# Patient Record
Sex: FEMALE | Race: WHITE | NOT HISPANIC OR LATINO | Employment: OTHER | URBAN - NONMETROPOLITAN AREA
[De-identification: names, ages, dates, MRNs, and addresses within clinical notes are randomized per-mention and may not be internally consistent; named-entity substitution may affect disease eponyms.]

---

## 2017-06-20 ENCOUNTER — OFFICE VISIT (OUTPATIENT)
Dept: FAMILY MEDICINE CLINIC | Facility: CLINIC | Age: 62
End: 2017-06-20

## 2017-06-20 VITALS
DIASTOLIC BLOOD PRESSURE: 70 MMHG | HEART RATE: 72 BPM | BODY MASS INDEX: 32.78 KG/M2 | WEIGHT: 192 LBS | TEMPERATURE: 98.2 F | OXYGEN SATURATION: 98 % | HEIGHT: 64 IN | SYSTOLIC BLOOD PRESSURE: 126 MMHG

## 2017-06-20 DIAGNOSIS — E55.9 VITAMIN D DEFICIENCY: ICD-10-CM

## 2017-06-20 DIAGNOSIS — F41.9 ANXIETY: Primary | ICD-10-CM

## 2017-06-20 DIAGNOSIS — K22.2 ESOPHAGEAL STRICTURE: ICD-10-CM

## 2017-06-20 PROBLEM — M72.2 PLANTAR FASCIITIS: Status: ACTIVE | Noted: 2017-06-20

## 2017-06-20 PROCEDURE — 99203 OFFICE O/P NEW LOW 30 MIN: CPT | Performed by: PHYSICIAN ASSISTANT

## 2017-06-20 RX ORDER — OMEPRAZOLE 20 MG/1
CAPSULE, DELAYED RELEASE ORAL
COMMUNITY
Start: 2017-05-25 | End: 2017-11-21 | Stop reason: SDUPTHER

## 2017-06-20 RX ORDER — MELATONIN
1000 DAILY
COMMUNITY
End: 2021-09-04

## 2017-06-20 RX ORDER — NAPROXEN 500 MG/1
TABLET ORAL
COMMUNITY
Start: 2017-05-08 | End: 2017-12-05

## 2017-06-20 RX ORDER — FLUOXETINE HYDROCHLORIDE 20 MG/1
CAPSULE ORAL
COMMUNITY
Start: 2017-05-05 | End: 2017-11-21 | Stop reason: SDUPTHER

## 2017-06-20 NOTE — PROGRESS NOTES
Subjective   Virginia Leiva is a 61 y.o. female. Patient is being seen today to establish care and to discuss getting referrals.     History of Present Illness     PATIENT WITH PMH GASTRITIS. STATES MOTHER WITH GASTRIC CANCER. HAS BEEN ON OMEPRAZOLE REGULARLY. 2015- WAS EATING STEAK AND GOT STUCK - HAD ESOPHAGEAL STRICTURE. REPEAT AS NEEDED- HAS HAD DILATION 3 X. NO DX VALLEJO'S ESOPH. BIOPSIES NORMAL. PREVIOUSLY HAD H PYLORI. COLONOSCOPY- 2012- RECHECK IN 10 YEARS- NORMAL.   FATHER DEC AGE 54 FROM ANEURYSM FROM PANCREATIC CANCER.     ORTHOPEDIST- GAVE INJECTIONS IN WRIST AND HAS BEEN OK SINCE. LAST SHOT IN MARCH. HAD 4 MONTHS PRIOR.   HAS BEEN ON NAPROXEN - HAS BEEN TAKING ONCE DAILY.     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 HAS WORKED WELL SINCE.   VIT D LOW. TAKES 1000IU DAILY.     LAST MAMMOGRAM- 8/2016.   LAST DEXA- NOT SURE.,   LAST COLONOSCOPY 2012- NORMAL RECHECK IN 10 YEARS.   LAST PAP- NOT SINCE HAD HYSTERECTOMY IN 2007. HYST FOR PROBLEMS WITH BLADDER AND UTERUS. HAD HYST WITH BSO. NO HX ABNORMAL PAP. SA 1 PARTNER 37 YEARS.   LAST TD < 5 YEARS.     The following portions of the patient's history were reviewed and updated as appropriate: allergies, current medications, past family history, past medical history, past social history, past surgical history and problem list.    Review of Systems   All other systems reviewed and are negative.      Objective   Physical Exam   Constitutional: She is oriented to person, place, and time. She appears well-developed and well-nourished.   HENT:   Head: Normocephalic and atraumatic.   Right Ear: External ear normal.   Left Ear: External ear normal.   Eyes: Conjunctivae are normal.   Neck: Neck supple.   Cardiovascular: Normal rate, regular rhythm, normal heart sounds and intact distal pulses.  Exam reveals no gallop and no friction rub.    No murmur heard.  Pulmonary/Chest: Effort normal and breath sounds normal. No  respiratory distress. She has no wheezes. She has no rales.   Musculoskeletal: She exhibits no edema or deformity.   Neurological: She is alert and oriented to person, place, and time.   Skin: Skin is warm and dry.   Psychiatric: She has a normal mood and affect. Her speech is normal and behavior is normal. Judgment and thought content normal. Cognition and memory are normal.   Nursing note and vitals reviewed.      Assessment/Plan   Virginia was seen today for establish care and discuss referrals.    Diagnoses and all orders for this visit:    Anxiety    Esophageal stricture    Vitamin D deficiency    Patient Instructions   61 YEAR OLD FEMALE WHO PRESENTS TODAY AS A NEW PATIENT. SHE HAS PMH SIGNIFICANT FOR ANXIETY, GERD, ESOPHAGEAL STRICTURE, AND VITAMIN D DEFICIENCY. PATIENT IS UP TO DATE ON SCREENING/PREVENTATIVE TESTING AND EGD. SHE HAD LABS IN THE RECENT PAST. PATIENT IS STABLE ON CURRENT MEDICATIONS. I ASKED THAT SHE STOP NAPROXEN AND DISCUSSED ALL RISKS OF NSAIDS. SHE WILL STOP NSAIDS AND USE TYLENOL 500 MG 1-2 TABLETS UP TO 2-3 X DAILY AS NEEDED. IF NO CONTROL WITH ARTHRITIS, TO CALL OR RETURN. PATIENT TO SIGN MEDICAL RELEASE FOR PREVIOUS PCP AND SPECIALISTS. I WILL REVIEW TO DETERMINE NEXT LABS AND FOLLOW UP. WILL NEED FOLLOW UP NO MORE THAN 6 MONTHS.

## 2017-06-27 NOTE — PATIENT INSTRUCTIONS
61 YEAR OLD FEMALE WHO PRESENTS TODAY AS A NEW PATIENT. SHE HAS PMH SIGNIFICANT FOR ANXIETY, GERD, ESOPHAGEAL STRICTURE, AND VITAMIN D DEFICIENCY. PATIENT IS UP TO DATE ON SCREENING/PREVENTATIVE TESTING AND EGD. SHE HAD LABS IN THE RECENT PAST. PATIENT IS STABLE ON CURRENT MEDICATIONS. I ASKED THAT SHE STOP NAPROXEN AND DISCUSSED ALL RISKS OF NSAIDS. SHE WILL STOP NSAIDS AND USE TYLENOL 500 MG 1-2 TABLETS UP TO 2-3 X DAILY AS NEEDED. IF NO CONTROL WITH ARTHRITIS, TO CALL OR RETURN. PATIENT TO SIGN MEDICAL RELEASE FOR PREVIOUS PCP AND SPECIALISTS. I WILL REVIEW TO DETERMINE NEXT LABS AND FOLLOW UP. WILL NEED FOLLOW UP NO MORE THAN 6 MONTHS.

## 2017-06-30 ENCOUNTER — TRANSCRIBE ORDERS (OUTPATIENT)
Dept: ADMINISTRATIVE | Facility: HOSPITAL | Age: 62
End: 2017-06-30

## 2017-06-30 DIAGNOSIS — Z13.9 SCREENING: Primary | ICD-10-CM

## 2017-07-14 ENCOUNTER — APPOINTMENT (OUTPATIENT)
Dept: CARDIOLOGY | Facility: HOSPITAL | Age: 62
End: 2017-07-14

## 2017-07-25 ENCOUNTER — HOSPITAL ENCOUNTER (OUTPATIENT)
Dept: CARDIOLOGY | Facility: HOSPITAL | Age: 62
Discharge: HOME | End: 2017-07-25
Admitting: PHYSICIAN ASSISTANT

## 2017-07-25 VITALS
SYSTOLIC BLOOD PRESSURE: 138 MMHG | WEIGHT: 194 LBS | DIASTOLIC BLOOD PRESSURE: 70 MMHG | HEART RATE: 56 BPM | HEIGHT: 63 IN | BODY MASS INDEX: 34.38 KG/M2

## 2017-07-25 DIAGNOSIS — Z13.9 SCREENING: ICD-10-CM

## 2017-07-25 LAB
BH CV ECHO MEAS - DIST AO DIAM: 1.66 CM
BH CV VAS BP LEFT ARM: NORMAL MMHG
BH CV VAS BP RIGHT ARM: NORMAL MMHG
BH CV XLRA MEAS - MID AO DIAM: 1.7 CM
BH CV XLRA MEAS - PAD LEFT ABI DP: 1.2
BH CV XLRA MEAS - PAD LEFT ABI PT: 1.21
BH CV XLRA MEAS - PAD LEFT ARM: 136 MMHG
BH CV XLRA MEAS - PAD LEFT LEG DP: 166 MMHG
BH CV XLRA MEAS - PAD LEFT LEG PT: 168 MMHG
BH CV XLRA MEAS - PAD RIGHT ABI DP: 1.21
BH CV XLRA MEAS - PAD RIGHT ABI PT: 1.23
BH CV XLRA MEAS - PAD RIGHT ARM: 138 MMHG
BH CV XLRA MEAS - PAD RIGHT LEG DP: 168 MMHG
BH CV XLRA MEAS - PAD RIGHT LEG PT: 170 MMHG
BH CV XLRA MEAS - PROX AO DIAM: 1.81 CM
BH CV XLRA MEAS LEFT ICA/CCA RATIO: 1.04
BH CV XLRA MEAS LEFT MID CCA PSV: NORMAL CM/SEC
BH CV XLRA MEAS LEFT MID ICA PSV: NORMAL CM/SEC
BH CV XLRA MEAS LEFT PROX ECA PSV: NORMAL CM/SEC
BH CV XLRA MEAS RIGHT ICA/CCA RATIO: 0.85
BH CV XLRA MEAS RIGHT MID CCA PSV: NORMAL CM/SEC
BH CV XLRA MEAS RIGHT MID ICA PSV: NORMAL CM/SEC
BH CV XLRA MEAS RIGHT PROX ECA PSV: NORMAL CM/SEC

## 2017-07-25 PROCEDURE — 93799 UNLISTED CV SVC/PROCEDURE: CPT

## 2017-11-21 ENCOUNTER — OFFICE VISIT (OUTPATIENT)
Dept: FAMILY MEDICINE CLINIC | Facility: CLINIC | Age: 62
End: 2017-11-21

## 2017-11-21 VITALS
HEIGHT: 63 IN | BODY MASS INDEX: 33.59 KG/M2 | TEMPERATURE: 97.8 F | WEIGHT: 189.6 LBS | HEART RATE: 60 BPM | SYSTOLIC BLOOD PRESSURE: 124 MMHG | OXYGEN SATURATION: 96 % | DIASTOLIC BLOOD PRESSURE: 76 MMHG

## 2017-11-21 DIAGNOSIS — K22.2 ESOPHAGEAL STRICTURE: ICD-10-CM

## 2017-11-21 DIAGNOSIS — M25.562 CHRONIC PAIN OF LEFT KNEE: ICD-10-CM

## 2017-11-21 DIAGNOSIS — K21.9 GASTROESOPHAGEAL REFLUX DISEASE, ESOPHAGITIS PRESENCE NOT SPECIFIED: ICD-10-CM

## 2017-11-21 DIAGNOSIS — F41.9 ANXIETY: ICD-10-CM

## 2017-11-21 DIAGNOSIS — M54.32 SCIATICA OF LEFT SIDE: Primary | ICD-10-CM

## 2017-11-21 DIAGNOSIS — M51.36 DEGENERATIVE DISC DISEASE, LUMBAR: ICD-10-CM

## 2017-11-21 DIAGNOSIS — M46.1 SACROILIITIS (HCC): ICD-10-CM

## 2017-11-21 DIAGNOSIS — G89.29 CHRONIC PAIN OF LEFT KNEE: ICD-10-CM

## 2017-11-21 PROCEDURE — 99213 OFFICE O/P EST LOW 20 MIN: CPT | Performed by: PHYSICIAN ASSISTANT

## 2017-11-21 RX ORDER — METHYLPREDNISOLONE 4 MG/1
TABLET ORAL
Qty: 21 TABLET | Refills: 0 | Status: SHIPPED | OUTPATIENT
Start: 2017-11-21 | End: 2017-12-05

## 2017-11-21 RX ORDER — FLUOXETINE HYDROCHLORIDE 20 MG/1
20 CAPSULE ORAL DAILY
Qty: 30 CAPSULE | Refills: 5 | Status: SHIPPED | OUTPATIENT
Start: 2017-11-21 | End: 2018-01-17 | Stop reason: SDUPTHER

## 2017-11-21 RX ORDER — INFLUENZA VIRUS VACCINE 15; 15; 15; 15 UG/.5ML; UG/.5ML; UG/.5ML; UG/.5ML
SUSPENSION INTRAMUSCULAR
COMMUNITY
Start: 2017-10-04 | End: 2017-11-21

## 2017-11-21 RX ORDER — OMEPRAZOLE 20 MG/1
20 CAPSULE, DELAYED RELEASE ORAL DAILY
Qty: 30 CAPSULE | Refills: 2 | Status: SHIPPED | OUTPATIENT
Start: 2017-11-21 | End: 2018-01-17 | Stop reason: SDUPTHER

## 2017-11-21 RX ORDER — TIZANIDINE 4 MG/1
4 TABLET ORAL EVERY 8 HOURS PRN
Qty: 45 TABLET | Refills: 0 | Status: SHIPPED | OUTPATIENT
Start: 2017-11-21 | End: 2018-03-09

## 2017-12-05 ENCOUNTER — OFFICE VISIT (OUTPATIENT)
Dept: FAMILY MEDICINE CLINIC | Facility: CLINIC | Age: 62
End: 2017-12-05

## 2017-12-05 VITALS
WEIGHT: 191 LBS | SYSTOLIC BLOOD PRESSURE: 124 MMHG | BODY MASS INDEX: 33.84 KG/M2 | DIASTOLIC BLOOD PRESSURE: 78 MMHG | HEART RATE: 75 BPM | HEIGHT: 63 IN | TEMPERATURE: 97.9 F | OXYGEN SATURATION: 96 %

## 2017-12-05 DIAGNOSIS — M25.562 CHRONIC PAIN OF LEFT KNEE: ICD-10-CM

## 2017-12-05 DIAGNOSIS — E78.49 OTHER HYPERLIPIDEMIA: ICD-10-CM

## 2017-12-05 DIAGNOSIS — R82.90 ABNORMAL URINALYSIS: ICD-10-CM

## 2017-12-05 DIAGNOSIS — M54.32 SCIATICA OF LEFT SIDE: Primary | ICD-10-CM

## 2017-12-05 DIAGNOSIS — G89.29 CHRONIC PAIN OF LEFT KNEE: ICD-10-CM

## 2017-12-05 DIAGNOSIS — E55.9 VITAMIN D DEFICIENCY: ICD-10-CM

## 2017-12-05 DIAGNOSIS — K21.9 GASTROESOPHAGEAL REFLUX DISEASE, ESOPHAGITIS PRESENCE NOT SPECIFIED: ICD-10-CM

## 2017-12-05 DIAGNOSIS — M51.36 DEGENERATIVE DISC DISEASE, LUMBAR: ICD-10-CM

## 2017-12-05 DIAGNOSIS — M46.1 SACROILIITIS (HCC): ICD-10-CM

## 2017-12-05 DIAGNOSIS — F41.9 ANXIETY: ICD-10-CM

## 2017-12-05 LAB
BILIRUB BLD-MCNC: NEGATIVE MG/DL
CLARITY, POC: CLEAR
COLOR UR: YELLOW
GLUCOSE UR STRIP-MCNC: NEGATIVE MG/DL
KETONES UR QL: NEGATIVE
LEUKOCYTE EST, POC: ABNORMAL
NITRITE UR-MCNC: POSITIVE MG/ML
PH UR: 5.5 [PH] (ref 5–8)
PROT UR STRIP-MCNC: NEGATIVE MG/DL
RBC # UR STRIP: ABNORMAL /UL
SP GR UR: 1.01 (ref 1–1.03)
UROBILINOGEN UR QL: NORMAL

## 2017-12-05 PROCEDURE — 99214 OFFICE O/P EST MOD 30 MIN: CPT | Performed by: PHYSICIAN ASSISTANT

## 2017-12-05 PROCEDURE — 81003 URINALYSIS AUTO W/O SCOPE: CPT | Performed by: PHYSICIAN ASSISTANT

## 2017-12-05 NOTE — PROGRESS NOTES
I have reviewed the notes, assessments, and/or procedures performed by Kacey Hernadez, I concur with her documentation of Virginia Leiva.

## 2017-12-05 NOTE — PATIENT INSTRUCTIONS
62 YEAR OLD FEMALE WHO PRESENTS TODAY IN FOLLOW UP OF SCIATICA AND SACROILIITIS. SHE REPORTS IMPROVEMENT WITH MEDICATION AND PHYSICAL THERAPY AS WELL AS HOME EXERCISES. SHE HAS IMPROVED STRENGTH IN LEFT LEG TO FLEXION AT THE KNEE BUT STILL VERY SUBTLE WEAKNESS IN LEFT LEG TO FLEXION AT THE HIP. SHE WILL CONTINUE PT AND HOME EXERCISES. TO CALL OR RETURN ASAP IF WORSENING, NEW SYMPTOMS, OR NO IMPROVEMENT. I WILL RECHECK STRENGTH AT 3 MONTH FOLLOW UP HERE. IF CONTINUED WEAKNESS, NEEDS MRI LUMBAR SPINE.     PATIENT HAD TRIPLE ARTERIAL SCREEN. RIGHT CAROTID WITH MILD STENOSIS (<50%) AND LEFT WAS NORMAL. SHE HAD NORMAL DYLAN AND AAA SCREEN. SHE ALREADY STARTED ASA 81 MG ONCE DAILY. SHE WILL B WORKING ON EXERCISES FOR BACK THEN SHOULD START REGULAR PHYSICAL ACTIVITY- CARDIOVASCULAR EXERCISE FOR 30-45 MINUTES 5-6 DAYS PER WEEK. SHE ALSO HAD CHOLESTEROL  AND  EARLIER THIS YEAR WITH PCP. I WILL RECHECK BUT SHE WILL LIKELY NEED STATIN. PATIENT HAS BEEN ON NO STATIN IN THE PAST. I WILL TRY LIPITOR IF ANY ELEVATION IN CHOLESTEROL OR LDL.     PATIENT TO FOLLOW UP IN 3 MONTHS, FASTING WITH ME. TO BE SEEN SOONER IF NEEDED PENDING LABS OR ANY SYMPTOMS. I WILL ENSURE UP TO DATE ON ALL HEALTH MAINTENANCE AT FOLLOW UP AS WELL.

## 2017-12-05 NOTE — PATIENT INSTRUCTIONS
62 YEAR OLD FEMALE WHO PRESENTS TODAY WITH ACUTE EXACERBATION OF LEFT HIP PAIN THAT APPEARS TO BE LUMBAR RADICULOPATHY. SHE HAD IN THE PAST BUT ACCOMPANIED BY NUMBNESS- NO NUMBNESS NOW. NO HIP PATHOLOGY ON EXAM. PATIENT TO TAKE MEDROL DOSE PACK AND ZANAFLEX AS NEEDED. I WILL ALSO REFER TO PHYSICAL THERAPY. PATIENT TO BE SEEN ASAP IF WORSENING OR NEW SYMPTOMS. PATIENT TO FOLLOW UP IN 2 WEEKS WITH FASTING LABS AND FOR FURTHER EVALUATION. SHE HAS VERY MILD WEAKNESS TO FLEXION AT THE HIP AND KNEE. I WILL RE-EVALUATE AT FOLLOW UP. IF NO IMPROVEMENT, I WILL REFER FOR ADDITIONAL IMAGING.    SHE IS NEEDING REFILL ON OMEPRAZOLE AND PROZAC. PATIENT CONTINUES TO DO WELL WITH MEDICATION. MEDICATION REFILLED TODAY.

## 2017-12-05 NOTE — PROGRESS NOTES
Subjective   Virginia Leiva is a 62 y.o. female seen today for follow-up on back pain states steroids and physical therapy was helped, needs fasting labs    History of Present Illness     STARTED PT 12/4, SCHEDULED FOR TWICE WEEKLY FOR 2 WEEKS. STATES SHE IS FEELING MUCH BETTER. DOING HOME EXERCISES TWICE DAILY. STILL TAKING ZANAFLEX AS NEEDED AT HOME. PAIN IS 1/10- WORSE WITH STANDING. NOT AS BAD WITH WALKING. PATIENT WAS HAVING TO PUT PILLOW BEHIND BACK WITH SITTING. WAS UNABLE TO STAND PROLONGED WITH PAIN. NOW SOME PAIN NOTED BUT DOES NOT PREVENT OR LIMIT ACTIVITY. NO NUMBNESS, TINGLING OR WEAKNESS IN LEGS OR FEET. NO GI/. NO SADDLE NUMBNESS OR PARESTHESIAS.     [facility] WHEN RX RUNS OUT AT BEGINNING OF 2018.    HAD LABS CHECKED ON 3/22/17 WITH DR. OLMSTEAD, PREVIOUS PCP IN [place]. HAD ELEVATED TOTAL CHOLESTEROL AND LDL, MILDLY ELEVATED SODIUM, AND LOW VITAMIN D.  HAS HAD NOTHING TO EAT OR DRINK THIS MORNING.     OTHERWISE, DOING WELL.     The following portions of the patient's history were reviewed and updated as appropriate: allergies, current medications, past family history, past medical history, past social history, past surgical history and problem list.    Review of Systems   Musculoskeletal: Positive for back pain (low left back ).   All other systems reviewed and are negative.      Objective   Physical Exam   Constitutional: She is oriented to person, place, and time. She appears well-developed and well-nourished.   HENT:   Head: Normocephalic and atraumatic.   Right Ear: External ear normal.   Left Ear: External ear normal.   Cardiovascular: Normal rate, regular rhythm, normal heart sounds and intact distal pulses.    Pulmonary/Chest: Effort normal and breath sounds normal. She has no wheezes. She has no rales.   Musculoskeletal: Normal range of motion. She exhibits no edema or deformity.        Lumbar back: She exhibits tenderness (TTP LEFT SI JOINT  ONLY. NO OTHER TTP). She exhibits no swelling, no edema, no spasm and normal pulse.   MILD WEAKNESS TO FLEXION AT HIP. IMPROVED STRENGTH TO FLEXION AT KNEE.    Neurological: She is alert and oriented to person, place, and time. She has normal strength.   Reflex Scores:       Patellar reflexes are 2+ on the right side and 2+ on the left side.       Achilles reflexes are 2+ on the right side and 2+ on the left side.  Psychiatric: She has a normal mood and affect. Her speech is normal and behavior is normal. Judgment and thought content normal. Cognition and memory are normal.   Nursing note and vitals reviewed.      Assessment/Plan   Virginia was seen today for follow-up for back pain.    Diagnoses and all orders for this visit:    Sciatica of left side  -     CBC & Differential  -     Comprehensive Metabolic Panel  -     CK  -     Lipid Panel With LDL / HDL Ratio  -     Thyroid Panel With TSH  -     Vitamin D 25 Hydroxy  -     POC Urinalysis Dipstick, Automated    Vitamin D deficiency  -     CBC & Differential  -     Comprehensive Metabolic Panel  -     CK  -     Lipid Panel With LDL / HDL Ratio  -     Thyroid Panel With TSH  -     Vitamin D 25 Hydroxy  -     POC Urinalysis Dipstick, Automated    Gastroesophageal reflux disease, esophagitis presence not specified  -     CBC & Differential  -     Comprehensive Metabolic Panel  -     CK  -     Lipid Panel With LDL / HDL Ratio  -     Thyroid Panel With TSH  -     Vitamin D 25 Hydroxy  -     POC Urinalysis Dipstick, Automated    Anxiety  -     CBC & Differential  -     Comprehensive Metabolic Panel  -     CK  -     Lipid Panel With LDL / HDL Ratio  -     Thyroid Panel With TSH  -     Vitamin D 25 Hydroxy  -     POC Urinalysis Dipstick, Automated    Other hyperlipidemia  -     CBC & Differential  -     Comprehensive Metabolic Panel  -     CK  -     Lipid Panel With LDL / HDL Ratio  -     Thyroid Panel With TSH  -     Vitamin D 25 Hydroxy  -     POC Urinalysis Dipstick,  Automated    Sacroiliitis  -     CBC & Differential  -     Comprehensive Metabolic Panel  -     CK  -     Lipid Panel With LDL / HDL Ratio  -     Thyroid Panel With TSH  -     Vitamin D 25 Hydroxy  -     POC Urinalysis Dipstick, Automated    Chronic pain of left knee  -     CBC & Differential  -     Comprehensive Metabolic Panel  -     CK  -     Lipid Panel With LDL / HDL Ratio  -     Thyroid Panel With TSH  -     Vitamin D 25 Hydroxy  -     POC Urinalysis Dipstick, Automated    Degenerative disc disease, lumbar  -     CBC & Differential  -     Comprehensive Metabolic Panel  -     CK  -     Lipid Panel With LDL / HDL Ratio  -     Thyroid Panel With TSH  -     Vitamin D 25 Hydroxy  -     POC Urinalysis Dipstick, Automated      Patient Instructions   62 YEAR OLD FEMALE WHO PRESENTS TODAY IN FOLLOW UP OF SCIATICA AND SACROILIITIS. SHE REPORTS IMPROVEMENT WITH MEDICATION AND PHYSICAL THERAPY AS WELL AS HOME EXERCISES. SHE HAS IMPROVED STRENGTH IN LEFT LEG TO FLEXION AT THE KNEE BUT STILL VERY SUBTLE WEAKNESS IN LEFT LEG TO FLEXION AT THE HIP. SHE WILL CONTINUE PT AND HOME EXERCISES. TO CALL OR RETURN ASAP IF WORSENING, NEW SYMPTOMS, OR NO IMPROVEMENT. I WILL RECHECK STRENGTH AT 3 MONTH FOLLOW UP HERE. IF CONTINUED WEAKNESS, NEEDS MRI LUMBAR SPINE.     PATIENT HAD TRIPLE ARTERIAL SCREEN. RIGHT CAROTID WITH MILD STENOSIS (<50%) AND LEFT WAS NORMAL. SHE HAD NORMAL DYLAN AND AAA SCREEN. SHE ALREADY STARTED ASA 81 MG ONCE DAILY. SHE WILL B WORKING ON EXERCISES FOR BACK THEN SHOULD START REGULAR PHYSICAL ACTIVITY- CARDIOVASCULAR EXERCISE FOR 30-45 MINUTES 5-6 DAYS PER WEEK. SHE ALSO HAD CHOLESTEROL  AND  EARLIER THIS YEAR WITH PCP. I WILL RECHECK BUT SHE WILL LIKELY NEED STATIN. PATIENT HAS BEEN ON NO STATIN IN THE PAST. I WILL TRY LIPITOR IF ANY ELEVATION IN CHOLESTEROL OR LDL.     PATIENT TO FOLLOW UP IN 3 MONTHS, FASTING WITH ME. TO BE SEEN SOONER IF NEEDED PENDING LABS OR ANY SYMPTOMS. I WILL ENSURE UP TO DATE  ON ALL HEALTH MAINTENANCE AT FOLLOW UP AS WELL.

## 2017-12-06 LAB
25(OH)D3+25(OH)D2 SERPL-MCNC: 32.8 NG/ML (ref 30–100)
ALBUMIN SERPL-MCNC: 5 G/DL (ref 3.5–5.2)
ALBUMIN/GLOB SERPL: 2 G/DL
ALP SERPL-CCNC: 86 U/L (ref 39–117)
ALT SERPL-CCNC: 24 U/L (ref 1–33)
AST SERPL-CCNC: 19 U/L (ref 1–32)
BASOPHILS # BLD AUTO: 0.03 10*3/MM3 (ref 0–0.2)
BASOPHILS NFR BLD AUTO: 0.3 % (ref 0–1.5)
BILIRUB SERPL-MCNC: 0.9 MG/DL (ref 0.1–1.2)
BUN SERPL-MCNC: 14 MG/DL (ref 8–23)
BUN/CREAT SERPL: 15.1 (ref 7–25)
CALCIUM SERPL-MCNC: 9.8 MG/DL (ref 8.6–10.5)
CHLORIDE SERPL-SCNC: 102 MMOL/L (ref 98–107)
CHOLEST SERPL-MCNC: 254 MG/DL (ref 0–200)
CK SERPL-CCNC: 98 U/L (ref 20–180)
CO2 SERPL-SCNC: 27.4 MMOL/L (ref 22–29)
CREAT SERPL-MCNC: 0.93 MG/DL (ref 0.57–1)
EOSINOPHIL # BLD AUTO: 0.18 10*3/MM3 (ref 0–0.7)
EOSINOPHIL NFR BLD AUTO: 2.1 % (ref 0.3–6.2)
ERYTHROCYTE [DISTWIDTH] IN BLOOD BY AUTOMATED COUNT: 14 % (ref 11.7–13)
FT4I SERPL CALC-MCNC: 2.2 (ref 1.2–4.9)
GFR SERPLBLD CREATININE-BSD FMLA CKD-EPI: 61 ML/MIN/1.73
GFR SERPLBLD CREATININE-BSD FMLA CKD-EPI: 74 ML/MIN/1.73
GLOBULIN SER CALC-MCNC: 2.5 GM/DL
GLUCOSE SERPL-MCNC: 97 MG/DL (ref 65–99)
HCT VFR BLD AUTO: 46.9 % (ref 35.6–45.5)
HDLC SERPL-MCNC: 54 MG/DL (ref 40–60)
HGB BLD-MCNC: 14.3 G/DL (ref 11.9–15.5)
IMM GRANULOCYTES # BLD: 0.02 10*3/MM3 (ref 0–0.03)
IMM GRANULOCYTES NFR BLD: 0.2 % (ref 0–0.5)
LDLC SERPL CALC-MCNC: 168 MG/DL (ref 0–100)
LDLC/HDLC SERPL: 3.11 {RATIO}
LYMPHOCYTES # BLD AUTO: 2.89 10*3/MM3 (ref 0.9–4.8)
LYMPHOCYTES NFR BLD AUTO: 33.6 % (ref 19.6–45.3)
MCH RBC QN AUTO: 30.6 PG (ref 26.9–32)
MCHC RBC AUTO-ENTMCNC: 30.5 G/DL (ref 32.4–36.3)
MCV RBC AUTO: 100.2 FL (ref 80.5–98.2)
MONOCYTES # BLD AUTO: 0.55 10*3/MM3 (ref 0.2–1.2)
MONOCYTES NFR BLD AUTO: 6.4 % (ref 5–12)
NEUTROPHILS # BLD AUTO: 4.92 10*3/MM3 (ref 1.9–8.1)
NEUTROPHILS NFR BLD AUTO: 57.4 % (ref 42.7–76)
PLATELET # BLD AUTO: 204 10*3/MM3 (ref 140–500)
POTASSIUM SERPL-SCNC: 4.3 MMOL/L (ref 3.5–5.2)
PROT SERPL-MCNC: 7.5 G/DL (ref 6–8.5)
RBC # BLD AUTO: 4.68 10*6/MM3 (ref 3.9–5.2)
SODIUM SERPL-SCNC: 143 MMOL/L (ref 136–145)
T3RU NFR SERPL: 26 % (ref 24–39)
T4 SERPL-MCNC: 8.3 UG/DL (ref 4.5–12)
TRIGL SERPL-MCNC: 159 MG/DL (ref 0–150)
TSH SERPL DL<=0.005 MIU/L-ACNC: 0.47 UIU/ML (ref 0.45–4.5)
VLDLC SERPL CALC-MCNC: 31.8 MG/DL (ref 5–40)
WBC # BLD AUTO: 8.59 10*3/MM3 (ref 4.5–10.7)

## 2017-12-08 LAB
APPEARANCE UR: (no result)
BACTERIA #/AREA URNS HPF: ABNORMAL /HPF
BACTERIA UR CULT: ABNORMAL
BACTERIA UR CULT: ABNORMAL
BILIRUB UR QL STRIP: NEGATIVE
CASTS URNS MICRO: ABNORMAL
COLOR UR: YELLOW
EPI CELLS #/AREA URNS HPF: ABNORMAL /HPF
GLUCOSE UR QL: NEGATIVE
HGB UR QL STRIP: NEGATIVE
KETONES UR QL STRIP: NEGATIVE
LEUKOCYTE ESTERASE UR QL STRIP: (no result)
MUCOUS THREADS URNS QL MICRO: ABNORMAL /HPF
NITRITE UR QL STRIP: POSITIVE
OTHER ANTIBIOTIC SUSC ISLT: ABNORMAL
PH UR STRIP: 6 [PH] (ref 5–8)
PROT UR QL STRIP: NEGATIVE
RBC #/AREA URNS HPF: ABNORMAL /HPF
SP GR UR: 1.02 (ref 1–1.03)
UROBILINOGEN UR STRIP-MCNC: (no result) MG/DL
WBC #/AREA URNS HPF: ABNORMAL /HPF

## 2017-12-15 DIAGNOSIS — D75.89 MACROCYTOSIS: Primary | ICD-10-CM

## 2017-12-15 RX ORDER — NITROFURANTOIN 25; 75 MG/1; MG/1
100 CAPSULE ORAL 2 TIMES DAILY
Qty: 14 CAPSULE | Refills: 0 | Status: SHIPPED | OUTPATIENT
Start: 2017-12-15 | End: 2018-03-09

## 2018-01-17 DIAGNOSIS — K21.9 GASTROESOPHAGEAL REFLUX DISEASE, ESOPHAGITIS PRESENCE NOT SPECIFIED: ICD-10-CM

## 2018-01-17 DIAGNOSIS — K22.2 ESOPHAGEAL STRICTURE: ICD-10-CM

## 2018-01-17 RX ORDER — FLUOXETINE HYDROCHLORIDE 20 MG/1
20 CAPSULE ORAL DAILY
Qty: 90 CAPSULE | Refills: 0 | Status: SHIPPED | OUTPATIENT
Start: 2018-01-17 | End: 2018-08-17 | Stop reason: SDUPTHER

## 2018-01-17 RX ORDER — OMEPRAZOLE 20 MG/1
20 CAPSULE, DELAYED RELEASE ORAL DAILY
Qty: 90 CAPSULE | Refills: 0 | Status: SHIPPED | OUTPATIENT
Start: 2018-01-17 | End: 2018-04-07 | Stop reason: SDUPTHER

## 2018-03-09 ENCOUNTER — OFFICE VISIT (OUTPATIENT)
Dept: FAMILY MEDICINE CLINIC | Facility: CLINIC | Age: 63
End: 2018-03-09

## 2018-03-09 VITALS
TEMPERATURE: 98.3 F | HEIGHT: 63 IN | WEIGHT: 194 LBS | BODY MASS INDEX: 34.38 KG/M2 | HEART RATE: 74 BPM | DIASTOLIC BLOOD PRESSURE: 76 MMHG | SYSTOLIC BLOOD PRESSURE: 122 MMHG | OXYGEN SATURATION: 96 %

## 2018-03-09 DIAGNOSIS — D75.89 MACROCYTOSIS: ICD-10-CM

## 2018-03-09 DIAGNOSIS — E55.9 VITAMIN D DEFICIENCY: ICD-10-CM

## 2018-03-09 DIAGNOSIS — E78.49 OTHER HYPERLIPIDEMIA: Primary | ICD-10-CM

## 2018-03-09 DIAGNOSIS — F41.9 ANXIETY: ICD-10-CM

## 2018-03-09 LAB
25(OH)D3+25(OH)D2 SERPL-MCNC: 34.7 NG/ML (ref 30–100)
ALBUMIN SERPL-MCNC: 4.6 G/DL (ref 3.5–5.2)
ALBUMIN/GLOB SERPL: 1.8 G/DL
ALP SERPL-CCNC: 83 U/L (ref 39–117)
ALT SERPL-CCNC: 22 U/L (ref 1–33)
AST SERPL-CCNC: 15 U/L (ref 1–32)
BASOPHILS # BLD AUTO: 0.03 10*3/MM3 (ref 0–0.2)
BASOPHILS NFR BLD AUTO: 0.3 % (ref 0–1.5)
BILIRUB SERPL-MCNC: 0.8 MG/DL (ref 0.1–1.2)
BUN SERPL-MCNC: 14 MG/DL (ref 8–23)
BUN/CREAT SERPL: 15.1 (ref 7–25)
CALCIUM SERPL-MCNC: 9.9 MG/DL (ref 8.6–10.5)
CHLORIDE SERPL-SCNC: 102 MMOL/L (ref 98–107)
CHOLEST SERPL-MCNC: 238 MG/DL (ref 0–200)
CK SERPL-CCNC: 72 U/L (ref 20–180)
CO2 SERPL-SCNC: 26.6 MMOL/L (ref 22–29)
CREAT SERPL-MCNC: 0.93 MG/DL (ref 0.57–1)
EOSINOPHIL # BLD AUTO: 0.11 10*3/MM3 (ref 0–0.7)
EOSINOPHIL NFR BLD AUTO: 1.1 % (ref 0.3–6.2)
ERYTHROCYTE [DISTWIDTH] IN BLOOD BY AUTOMATED COUNT: 14.4 % (ref 11.7–13)
FOLATE SERPL-MCNC: 13.16 NG/ML (ref 4.78–24.2)
GFR SERPLBLD CREATININE-BSD FMLA CKD-EPI: 61 ML/MIN/1.73
GFR SERPLBLD CREATININE-BSD FMLA CKD-EPI: 74 ML/MIN/1.73
GLOBULIN SER CALC-MCNC: 2.6 GM/DL
GLUCOSE SERPL-MCNC: 83 MG/DL (ref 65–99)
HCT VFR BLD AUTO: 43.1 % (ref 35.6–45.5)
HDLC SERPL-MCNC: 64 MG/DL (ref 40–60)
HGB BLD-MCNC: 13.7 G/DL (ref 11.9–15.5)
IMM GRANULOCYTES # BLD: 0.05 10*3/MM3 (ref 0–0.03)
IMM GRANULOCYTES NFR BLD: 0.5 % (ref 0–0.5)
LDLC SERPL CALC-MCNC: 156 MG/DL (ref 0–100)
LDLC/HDLC SERPL: 2.43 {RATIO}
LYMPHOCYTES # BLD AUTO: 2.74 10*3/MM3 (ref 0.9–4.8)
LYMPHOCYTES NFR BLD AUTO: 26.9 % (ref 19.6–45.3)
MCH RBC QN AUTO: 30.7 PG (ref 26.9–32)
MCHC RBC AUTO-ENTMCNC: 31.8 G/DL (ref 32.4–36.3)
MCV RBC AUTO: 96.6 FL (ref 80.5–98.2)
MONOCYTES # BLD AUTO: 0.67 10*3/MM3 (ref 0.2–1.2)
MONOCYTES NFR BLD AUTO: 6.6 % (ref 5–12)
NEUTROPHILS # BLD AUTO: 6.58 10*3/MM3 (ref 1.9–8.1)
NEUTROPHILS NFR BLD AUTO: 64.6 % (ref 42.7–76)
PLATELET # BLD AUTO: 213 10*3/MM3 (ref 140–500)
POTASSIUM SERPL-SCNC: 4.2 MMOL/L (ref 3.5–5.2)
PROT SERPL-MCNC: 7.2 G/DL (ref 6–8.5)
RBC # BLD AUTO: 4.46 10*6/MM3 (ref 3.9–5.2)
SODIUM SERPL-SCNC: 142 MMOL/L (ref 136–145)
TRIGL SERPL-MCNC: 91 MG/DL (ref 0–150)
VIT B12 SERPL-MCNC: 373 PG/ML (ref 211–946)
VLDLC SERPL CALC-MCNC: 18.2 MG/DL (ref 5–40)
WBC # BLD AUTO: 10.18 10*3/MM3 (ref 4.5–10.7)

## 2018-03-09 PROCEDURE — 99213 OFFICE O/P EST LOW 20 MIN: CPT | Performed by: PHYSICIAN ASSISTANT

## 2018-03-09 RX ORDER — ACETAMINOPHEN 500 MG
500 TABLET ORAL 2 TIMES DAILY
COMMUNITY
End: 2021-09-04

## 2018-03-09 NOTE — PATIENT INSTRUCTIONS
62 YEAR OLD FEMALE WHO PRESENTS TODAY IN FOLLOW UP OF HYPERLIPIDEMIA, VITAMIN D DEFICIENCY, MACROCYTOSIS, AND MOODS. SHE IS DOING WELL WITH THE EXCEPTION OF SOME JOINT PAINS. SHE HAD BILATERAL WRIST INJECTION YESTERDAY. PATIENT REPORTS MOODS HAVE BEEN WELL CONTROLLED AND SHE CONTINUES TO WORK PART TIME. I WILL CHECK FASTING LABS TODAY- CALL IF NO RESULTS IN 1 WEEK. FOLLOW UP IN 3-6 MONTHS PENDING LABS. WILL NEED CPE WHEN SHE HAS NEXT FOLLOW UP. TO BE SEEN SOONER IF NEEDED.

## 2018-03-09 NOTE — PROGRESS NOTES
Subjective   Virginia Leiva is a 62 y.o. female seen today follow-up hyperlipidemia, anxiety, Vitamin D deficiency     History of Present Illness     HAD TO GET INJECTION IN BILATERAL WRISTS. OTHERWISE, HAS BEEN DOING WELL. NO CP/SOA/PALP. NO OTHER COMPLAINTS. MOODS HAVE BEEN CONTROLLED.     The following portions of the patient's history were reviewed and updated as appropriate: allergies, current medications, past family history, past medical history, past social history, past surgical history and problem list.    Review of Systems   Musculoskeletal: Positive for arthralgias.   All other systems reviewed and are negative.      Objective   Physical Exam   Constitutional: She is oriented to person, place, and time. She appears well-developed and well-nourished.   HENT:   Head: Normocephalic and atraumatic.   Right Ear: External ear normal.   Left Ear: External ear normal.   Nose: Nose normal.   Eyes: Conjunctivae and lids are normal.   Neck: Neck supple. Carotid bruit is not present.   Cardiovascular: Normal rate, regular rhythm, normal heart sounds and intact distal pulses.  Exam reveals no gallop and no friction rub.    No murmur heard.  Pulmonary/Chest: Effort normal and breath sounds normal. No respiratory distress. She has no wheezes. She has no rhonchi. She has no rales.   Musculoskeletal: She exhibits no edema or deformity.   Neurological: She is alert and oriented to person, place, and time. Gait normal.   Skin: Skin is warm and dry.   Psychiatric: She has a normal mood and affect. Her speech is normal and behavior is normal. Judgment and thought content normal. Cognition and memory are normal.   Nursing note and vitals reviewed.      Assessment/Plan   Virginia was seen today for follow-up.    Diagnoses and all orders for this visit:    Other hyperlipidemia  -     CBC & Differential  -     Comprehensive Metabolic Panel  -     CK  -     Lipid Panel With LDL / HDL Ratio  -     Vitamin B12 & Folate  -     Vitamin  D 25 Hydroxy  -     POC Urinalysis Dipstick, Automated    Macrocytosis  -     CBC & Differential  -     Comprehensive Metabolic Panel  -     CK  -     Lipid Panel With LDL / HDL Ratio  -     Vitamin B12 & Folate  -     Vitamin D 25 Hydroxy  -     POC Urinalysis Dipstick, Automated    Vitamin D deficiency  -     CBC & Differential  -     Comprehensive Metabolic Panel  -     CK  -     Lipid Panel With LDL / HDL Ratio  -     Vitamin B12 & Folate  -     Vitamin D 25 Hydroxy  -     POC Urinalysis Dipstick, Automated    Anxiety      Patient Instructions   62 YEAR OLD FEMALE WHO PRESENTS TODAY IN FOLLOW UP OF HYPERLIPIDEMIA, VITAMIN D DEFICIENCY, MACROCYTOSIS, AND MOODS. SHE IS DOING WELL WITH THE EXCEPTION OF SOME JOINT PAINS. SHE HAD BILATERAL WRIST INJECTION YESTERDAY. PATIENT REPORTS MOODS HAVE BEEN WELL CONTROLLED AND SHE CONTINUES TO WORK PART TIME. I WILL CHECK FASTING LABS TODAY- CALL IF NO RESULTS IN 1 WEEK. FOLLOW UP IN 3-6 MONTHS PENDING LABS. WILL NEED CPE WHEN SHE HAS NEXT FOLLOW UP. TO BE SEEN SOONER IF NEEDED.

## 2018-04-07 DIAGNOSIS — K22.2 ESOPHAGEAL STRICTURE: ICD-10-CM

## 2018-04-07 DIAGNOSIS — K21.9 GASTROESOPHAGEAL REFLUX DISEASE, ESOPHAGITIS PRESENCE NOT SPECIFIED: ICD-10-CM

## 2018-04-09 RX ORDER — OMEPRAZOLE 20 MG/1
CAPSULE, DELAYED RELEASE ORAL
Qty: 90 CAPSULE | Refills: 1 | Status: SHIPPED | OUTPATIENT
Start: 2018-04-09 | End: 2018-05-17

## 2018-04-10 ENCOUNTER — PATIENT MESSAGE (OUTPATIENT)
Dept: FAMILY MEDICINE CLINIC | Facility: CLINIC | Age: 63
End: 2018-04-10

## 2018-04-10 DIAGNOSIS — K21.9 GASTROESOPHAGEAL REFLUX DISEASE, ESOPHAGITIS PRESENCE NOT SPECIFIED: Primary | ICD-10-CM

## 2018-04-10 DIAGNOSIS — Z80.0 FAMILY HISTORY OF DUODENAL CANCER: ICD-10-CM

## 2018-04-10 DIAGNOSIS — Z80.0 FAMILY HISTORY OF STOMACH CANCER: ICD-10-CM

## 2018-04-10 NOTE — TELEPHONE ENCOUNTER
From: Virginia Leiva  To: RIP Scruggs  Sent: 4/10/2018 12:11 PM EDT  Subject: Referral Request    Do I need a referral for a gastroenterologist? I’m having some reflux issues and wanted to have it checked out. Do you have suggestions? I’m a little nervous since my mom had stomach cancer and my dad had cancer of the duodenal

## 2018-05-03 ENCOUNTER — PREP FOR SURGERY (OUTPATIENT)
Dept: OTHER | Facility: HOSPITAL | Age: 63
End: 2018-05-03

## 2018-05-03 DIAGNOSIS — Z80.0 FAMILY HISTORY OF GI MALIGNANCY: Primary | ICD-10-CM

## 2018-05-03 RX ORDER — SODIUM CHLORIDE, SODIUM LACTATE, POTASSIUM CHLORIDE, CALCIUM CHLORIDE 600; 310; 30; 20 MG/100ML; MG/100ML; MG/100ML; MG/100ML
30 INJECTION, SOLUTION INTRAVENOUS CONTINUOUS
Status: CANCELLED | OUTPATIENT
Start: 2018-05-18

## 2018-05-17 RX ORDER — OMEPRAZOLE 20 MG/1
20 CAPSULE, DELAYED RELEASE ORAL DAILY
COMMUNITY
End: 2018-10-04

## 2018-05-18 ENCOUNTER — ANESTHESIA (OUTPATIENT)
Dept: GASTROENTEROLOGY | Facility: HOSPITAL | Age: 63
End: 2018-05-18

## 2018-05-18 ENCOUNTER — HOSPITAL ENCOUNTER (OUTPATIENT)
Facility: HOSPITAL | Age: 63
Setting detail: HOSPITAL OUTPATIENT SURGERY
Discharge: HOME | End: 2018-05-18
Attending: INTERNAL MEDICINE | Admitting: INTERNAL MEDICINE

## 2018-05-18 ENCOUNTER — ANESTHESIA EVENT (OUTPATIENT)
Dept: GASTROENTEROLOGY | Facility: HOSPITAL | Age: 63
End: 2018-05-18

## 2018-05-18 VITALS
HEART RATE: 57 BPM | HEIGHT: 63 IN | WEIGHT: 193 LBS | RESPIRATION RATE: 16 BRPM | SYSTOLIC BLOOD PRESSURE: 130 MMHG | BODY MASS INDEX: 34.2 KG/M2 | DIASTOLIC BLOOD PRESSURE: 80 MMHG | TEMPERATURE: 97.9 F | OXYGEN SATURATION: 96 %

## 2018-05-18 DIAGNOSIS — Z80.0 FAMILY HISTORY OF GI MALIGNANCY: ICD-10-CM

## 2018-05-18 PROCEDURE — 88312 SPECIAL STAINS GROUP 1: CPT | Performed by: INTERNAL MEDICINE

## 2018-05-18 PROCEDURE — 88305 TISSUE EXAM BY PATHOLOGIST: CPT | Performed by: INTERNAL MEDICINE

## 2018-05-18 PROCEDURE — 43239 EGD BIOPSY SINGLE/MULTIPLE: CPT | Performed by: INTERNAL MEDICINE

## 2018-05-18 PROCEDURE — 25010000002 PROPOFOL 10 MG/ML EMULSION: Performed by: NURSE ANESTHETIST, CERTIFIED REGISTERED

## 2018-05-18 RX ORDER — SODIUM CHLORIDE 0.9 % (FLUSH) 0.9 %
3 SYRINGE (ML) INJECTION AS NEEDED
Status: DISCONTINUED | OUTPATIENT
Start: 2018-05-18 | End: 2018-05-18 | Stop reason: HOSPADM

## 2018-05-18 RX ORDER — LIDOCAINE HYDROCHLORIDE 10 MG/ML
0.5 INJECTION, SOLUTION INFILTRATION; PERINEURAL ONCE AS NEEDED
Status: DISCONTINUED | OUTPATIENT
Start: 2018-05-18 | End: 2018-05-18 | Stop reason: HOSPADM

## 2018-05-18 RX ORDER — SODIUM CHLORIDE, SODIUM LACTATE, POTASSIUM CHLORIDE, CALCIUM CHLORIDE 600; 310; 30; 20 MG/100ML; MG/100ML; MG/100ML; MG/100ML
1000 INJECTION, SOLUTION INTRAVENOUS CONTINUOUS
Status: DISCONTINUED | OUTPATIENT
Start: 2018-05-18 | End: 2018-05-18 | Stop reason: HOSPADM

## 2018-05-18 RX ORDER — PROPOFOL 10 MG/ML
VIAL (ML) INTRAVENOUS CONTINUOUS PRN
Status: DISCONTINUED | OUTPATIENT
Start: 2018-05-18 | End: 2018-05-18 | Stop reason: SURG

## 2018-05-18 RX ORDER — PROPOFOL 10 MG/ML
VIAL (ML) INTRAVENOUS AS NEEDED
Status: DISCONTINUED | OUTPATIENT
Start: 2018-05-18 | End: 2018-05-18 | Stop reason: SURG

## 2018-05-18 RX ORDER — SODIUM CHLORIDE, SODIUM LACTATE, POTASSIUM CHLORIDE, CALCIUM CHLORIDE 600; 310; 30; 20 MG/100ML; MG/100ML; MG/100ML; MG/100ML
30 INJECTION, SOLUTION INTRAVENOUS CONTINUOUS
Status: DISCONTINUED | OUTPATIENT
Start: 2018-05-18 | End: 2018-05-18 | Stop reason: HOSPADM

## 2018-05-18 RX ORDER — LIDOCAINE HYDROCHLORIDE 20 MG/ML
INJECTION, SOLUTION INFILTRATION; PERINEURAL AS NEEDED
Status: DISCONTINUED | OUTPATIENT
Start: 2018-05-18 | End: 2018-05-18 | Stop reason: SURG

## 2018-05-18 RX ADMIN — PROPOFOL 100 MG: 10 INJECTION, EMULSION INTRAVENOUS at 11:13

## 2018-05-18 RX ADMIN — SODIUM CHLORIDE, POTASSIUM CHLORIDE, SODIUM LACTATE AND CALCIUM CHLORIDE 30 ML/HR: 600; 310; 30; 20 INJECTION, SOLUTION INTRAVENOUS at 10:41

## 2018-05-18 RX ADMIN — PROPOFOL 200 MCG/KG/MIN: 10 INJECTION, EMULSION INTRAVENOUS at 11:15

## 2018-05-18 RX ADMIN — LIDOCAINE HYDROCHLORIDE 60 MG: 20 INJECTION, SOLUTION INFILTRATION; PERINEURAL at 11:11

## 2018-05-18 RX ADMIN — PROPOFOL 50 MG: 10 INJECTION, EMULSION INTRAVENOUS at 11:15

## 2018-05-18 NOTE — H&P
CC: Here for endoscopic evaluation.     HPI: Family history of duodenal cancer.     Past Medical History:   Diagnosis Date   • Anxiety    • Arthritis    • GERD (gastroesophageal reflux disease)    • Glaucoma        Past Surgical History:   Procedure Laterality Date   • BLADDER REPAIR  2008    tack    • COLONOSCOPY     • ENDOSCOPY      2008   • HYSTERECTOMY  2007    with bladder tack    • TUBAL ABDOMINAL LIGATION  1984       Prior to Admission medications    Medication Sig Start Date End Date Taking? Authorizing Provider   acetaminophen (TYLENOL) 500 MG tablet Take 500 mg by mouth 2 (Two) Times a Day.   Yes Historical Provider, MD   aspirin 81 MG tablet Take 81 mg by mouth.   Yes Historical Provider, MD   cholecalciferol (VITAMIN D3) 1000 UNITS tablet Take 1,000 Units by mouth Daily.   Yes Historical Provider, MD   FLUoxetine (PROzac) 20 MG capsule Take 1 capsule by mouth Daily. 1/17/18  Yes RIP Scruggs   omeprazole (priLOSEC) 20 MG capsule Take 20 mg by mouth Daily.   Yes Historical Provider, MD       No Known Allergies    Family History   Problem Relation Age of Onset   • Cancer Mother         stomach   • Arthritis Mother    • Cancer Father         pancreatic   • Aortic aneurysm Father    • Cancer Maternal Grandfather         LUNG   • Cancer Paternal Grandmother         OVARIAN   • Malig Hyperthermia Neg Hx        OBJECTIVE:    Patient's vital signs reviewed. No acute distress.     Chest is clear, no wheezing or rales. Normal symmetric air entry throughout both lung fields. No chest wall deformities or tenderness.    S1 and S2 normal, no murmurs, clicks, gallops or rubs. Regular rate and rhythm. Chest is clear; no wheezes or rales. No edema or JVD.    The abdomen is soft without tenderness, guarding, mass, rebound or organomegaly. Bowel sounds are normal. No CVA tenderness or inguinal adenopathy noted.    Patient is alert, oriented and with an intact memory.    Assessment: Family history of duodenal  cancer.    Plan: EGD.

## 2018-05-18 NOTE — ANESTHESIA POSTPROCEDURE EVALUATION
"Patient: Virginia Leiva    Procedure Summary     Date:  05/18/18 Room / Location:   [facility]    Anesthesia Start:  1108 Anesthesia Stop:  1130    Procedure:  ESOPHAGOGASTRODUODENOSCOPY WITH COLD BIOPSIES (N/A Esophagus) Diagnosis:       Family history of GI malignancy      (Family history of GI malignancy [Z80.0])    Surgeon:  Yobani Covarrubias MD Provider:  Estrella Mabry MD    Anesthesia Type:  MAC ASA Status:  2          Anesthesia Type: MAC  Last vitals  BP   130/80 (05/18/18 1151)   Temp   36.6 °C (97.9 °F) (05/18/18 1144)   Pulse   57 (05/18/18 1151)   Resp   16 (05/18/18 1151)     SpO2   96 % (05/18/18 1151)     Post Anesthesia Care and Evaluation    Patient location during evaluation: PACU  Patient participation: complete - patient participated  Level of consciousness: awake and alert  Pain management: adequate  Airway patency: patent  Anesthetic complications: No anesthetic complications    Cardiovascular status: acceptable  Respiratory status: acceptable  Hydration status: acceptable    Comments: /80 (BP Location: Left arm, Patient Position: Lying)   Pulse 57   Temp 36.6 °C (97.9 °F) (Oral)   Resp 16   Ht 160 cm (63\")   Wt 87.5 kg (193 lb)   SpO2 96%   BMI 34.19 kg/m²               "

## 2018-05-18 NOTE — ANESTHESIA PREPROCEDURE EVALUATION
Anesthesia Evaluation     Patient summary reviewed and Nursing notes reviewed   NPO Solid Status: > 8 hours  NPO Liquid Status: > 8 hours           Airway   Mallampati: II  TM distance: >3 FB  Neck ROM: full  Possible difficult intubation  Dental - normal exam     Pulmonary - normal exam   Cardiovascular - normal exam        Neuro/Psych  (+) psychiatric history Anxiety,     GI/Hepatic/Renal/Endo    (+) obesity,  GERD poorly controlled,      Musculoskeletal     Abdominal  - normal exam   Substance History      OB/GYN          Other                        Anesthesia Plan    ASA 2     MAC     Anesthetic plan and risks discussed with patient.

## 2018-05-21 LAB
CYTO UR: NORMAL
LAB AP CASE REPORT: NORMAL
Lab: NORMAL
PATH REPORT.FINAL DX SPEC: NORMAL
PATH REPORT.GROSS SPEC: NORMAL

## 2018-08-17 DIAGNOSIS — K21.9 GASTROESOPHAGEAL REFLUX DISEASE, ESOPHAGITIS PRESENCE NOT SPECIFIED: ICD-10-CM

## 2018-08-17 DIAGNOSIS — K22.2 ESOPHAGEAL STRICTURE: ICD-10-CM

## 2018-08-17 RX ORDER — FLUOXETINE HYDROCHLORIDE 20 MG/1
CAPSULE ORAL
Qty: 90 CAPSULE | Refills: 0 | Status: SHIPPED | OUTPATIENT
Start: 2018-08-17 | End: 2018-11-04 | Stop reason: SDUPTHER

## 2018-08-24 ENCOUNTER — OFFICE VISIT (OUTPATIENT)
Dept: FAMILY MEDICINE CLINIC | Facility: CLINIC | Age: 63
End: 2018-08-24

## 2018-08-24 VITALS
BODY MASS INDEX: 35.58 KG/M2 | HEART RATE: 70 BPM | OXYGEN SATURATION: 96 % | HEIGHT: 63 IN | SYSTOLIC BLOOD PRESSURE: 130 MMHG | TEMPERATURE: 98 F | DIASTOLIC BLOOD PRESSURE: 72 MMHG | WEIGHT: 200.8 LBS

## 2018-08-24 DIAGNOSIS — R31.9 HEMATURIA, UNSPECIFIED TYPE: ICD-10-CM

## 2018-08-24 DIAGNOSIS — R94.31 ABNORMAL EKG: ICD-10-CM

## 2018-08-24 DIAGNOSIS — Z78.0 POSTMENOPAUSAL: ICD-10-CM

## 2018-08-24 DIAGNOSIS — Z00.00 ROUTINE ADULT HEALTH MAINTENANCE: Primary | ICD-10-CM

## 2018-08-24 DIAGNOSIS — E55.9 VITAMIN D DEFICIENCY: ICD-10-CM

## 2018-08-24 DIAGNOSIS — G89.29 BILATERAL CHRONIC KNEE PAIN: ICD-10-CM

## 2018-08-24 DIAGNOSIS — E78.49 OTHER HYPERLIPIDEMIA: ICD-10-CM

## 2018-08-24 DIAGNOSIS — R15.2 DEFECATION URGENCY: ICD-10-CM

## 2018-08-24 DIAGNOSIS — Z12.39 BREAST CANCER SCREENING: ICD-10-CM

## 2018-08-24 DIAGNOSIS — M25.562 BILATERAL CHRONIC KNEE PAIN: ICD-10-CM

## 2018-08-24 DIAGNOSIS — D75.89 MACROCYTOSIS: ICD-10-CM

## 2018-08-24 DIAGNOSIS — R15.9 INCONTINENCE OF FECES, UNSPECIFIED FECAL INCONTINENCE TYPE: ICD-10-CM

## 2018-08-24 DIAGNOSIS — M25.561 BILATERAL CHRONIC KNEE PAIN: ICD-10-CM

## 2018-08-24 LAB
BILIRUB BLD-MCNC: NEGATIVE MG/DL
CLARITY, POC: ABNORMAL
COLOR UR: YELLOW
DEVELOPER EXPIRATION DATE: NORMAL
DEVELOPER LOT NUMBER: NORMAL
EXPIRATION DATE: NORMAL
FECAL OCCULT BLOOD SCREEN, POC: NEGATIVE
GLUCOSE UR STRIP-MCNC: NEGATIVE MG/DL
KETONES UR QL: NEGATIVE
LEUKOCYTE EST, POC: ABNORMAL
Lab: NORMAL
NEGATIVE CONTROL: NEGATIVE
NITRITE UR-MCNC: POSITIVE MG/ML
PH UR: 6 [PH] (ref 5–8)
POSITIVE CONTROL: POSITIVE
PROT UR STRIP-MCNC: NEGATIVE MG/DL
RBC # UR STRIP: ABNORMAL /UL
SP GR UR: 1.01 (ref 1–1.03)
UROBILINOGEN UR QL: NORMAL

## 2018-08-24 PROCEDURE — 99214 OFFICE O/P EST MOD 30 MIN: CPT | Performed by: PHYSICIAN ASSISTANT

## 2018-08-24 PROCEDURE — 93000 ELECTROCARDIOGRAM COMPLETE: CPT | Performed by: PHYSICIAN ASSISTANT

## 2018-08-24 PROCEDURE — 81003 URINALYSIS AUTO W/O SCOPE: CPT | Performed by: PHYSICIAN ASSISTANT

## 2018-08-24 PROCEDURE — 82270 OCCULT BLOOD FECES: CPT | Performed by: PHYSICIAN ASSISTANT

## 2018-08-24 PROCEDURE — 99396 PREV VISIT EST AGE 40-64: CPT | Performed by: PHYSICIAN ASSISTANT

## 2018-08-24 NOTE — PROGRESS NOTES
Subjective   Virginia Leiva is a 63 y.o. female. Patient seen today for annual exam     History of Present Illness     WILL HAVE SURGERY WITH DR DAVIS 10/1/18 RIGHT MCP JOINT.   EVER SO OFTEN, STOMACH GETS UPSET- WHEN SNEEZES, HAS FECAL INCONTINENCE. OCCURS ABOUT ONCE EVERY 2 MONTHS. STARTED 2-3 MONTHS AGO. NO RADICULOPATHY OR SADDLE ANESTHESIA. ALSO NOTED FECAL URGENCY- REPORTS SHE IS UNABLE TO HOLD STOOL WHEN SHE HAS TO HAVE BM. PATIENT CONTINUES WITH LOW BACK PAIN BUT NO OTHER SYMPTOMS.     LAST MAMMOGRAM- 8/2016.   LAST DEXA- NOT SURE.  LAST COLONOSCOPY 2012- NORMAL RECHECK IN 10 YEARS.   LAST PAP- NOT SINCE HAD HYSTERECTOMY IN 2007. HYST FOR PROBLEMS WITH BLADDER AND UTERUS. HAD HYST WITH BSO. NO HX ABNORMAL PAP. SA 1 PARTNER 38 YEARS.   LAST TD BETWEEN 5-10 YEARS.     The following portions of the patient's history were reviewed and updated as appropriate: allergies, current medications, past family history, past medical history, past social history, past surgical history and problem list.    Review of Systems   All other systems reviewed and are negative.      Objective   Physical Exam   Constitutional: She is oriented to person, place, and time. She appears well-developed and well-nourished. No distress.   HENT:   Head: Normocephalic and atraumatic.   Right Ear: Hearing, tympanic membrane, external ear and ear canal normal.   Left Ear: Hearing, tympanic membrane, external ear and ear canal normal.   Nose: Nose normal.   Mouth/Throat: Oropharynx is clear and moist.   Eyes: Pupils are equal, round, and reactive to light. Conjunctivae, EOM and lids are normal.   Neck: Neck supple. No JVD present. Carotid bruit is not present. No tracheal deviation present. No thyroid mass and no thyromegaly present.   Cardiovascular: Normal rate, regular rhythm, normal heart sounds and intact distal pulses.  Exam reveals no gallop and no friction rub.    No murmur heard.  Pulses:       Radial pulses are 2+ on the right side,  and 2+ on the left side.        Posterior tibial pulses are 2+ on the right side, and 2+ on the left side.   Pulmonary/Chest: Effort normal and breath sounds normal. No respiratory distress. She has no wheezes. She has no rhonchi. She has no rales.   Abdominal: Soft. Normal aorta and bowel sounds are normal. She exhibits no distension and no abdominal bruit. There is no hepatosplenomegaly. There is no tenderness. There is no rigidity, no rebound and no guarding. No hernia.   Genitourinary: Rectal exam shows external hemorrhoid (LARGE EXTERNAL HEMORRHOID ON RIGHT WITH POSSIBLE MILD RECTAL PROLAPSE NOTED. ) and anal tone abnormal (SIGNIFICANTLY REDUCED RECTAL TONE. ). Rectal exam shows no fissure.   Musculoskeletal: Normal range of motion. She exhibits no edema, tenderness or deformity.   Normal strength.   Lymphadenopathy:     She has no cervical adenopathy.   Neurological: She is alert and oriented to person, place, and time. She has normal strength and normal reflexes. She displays normal reflexes. No cranial nerve deficit or sensory deficit. She exhibits normal muscle tone. Coordination and gait normal.   Skin: Skin is warm and dry. No rash noted. She is not diaphoretic. No erythema.   Psychiatric: She has a normal mood and affect. Her speech is normal and behavior is normal. Judgment and thought content normal. Cognition and memory are normal.     ECG 12 Lead  Date/Time: 8/24/2018 10:13 AM  Performed by: ARIANA BURCIAGA  Authorized by: ARIANA BURCIAGA   Comparison: not compared with previous ECG   Previous ECG: no previous ECG available  Rhythm: sinus bradycardia  Rate: bradycardic  Conduction comments: Borderline prolonged TN- borderline 1st deg block  ST Elevation: V1 and V2  ST Depression: V4, V5 and V6  T Waves: T waves normal  QRS axis: left  Other: no other findings  Clinical impression: abnormal ECG  Comments: INDICATION: CPE          Assessment/Plan   Virginia was seen today for annual exam.    Diagnoses  and all orders for this visit:    Routine adult health maintenance  -     ECG 12 Lead  -     CBC & Differential  -     Comprehensive Metabolic Panel  -     Lipid Panel With LDL / HDL Ratio  -     Vitamin B12 & Folate  -     Thyroid Panel With TSH  -     Vitamin D 25 Hydroxy  -     POC Urinalysis Dipstick, Automated  -     POC Occult Blood Stool    Other hyperlipidemia  -     Lipid Panel With LDL / HDL Ratio    Vitamin D deficiency  -     Vitamin D 25 Hydroxy    Macrocytosis  -     CBC & Differential  -     Vitamin B12 & Folate    Bilateral chronic knee pain    Abnormal EKG  -     Ambulatory Referral to Cardiology    Breast cancer screening  -     Mammo Screening Bilateral With CAD    Postmenopausal  -     DEXA Bone Density Axial    Hematuria, unspecified type  -     Urinalysis With Microscopic - Urine, Clean Catch  -     Urine Culture - Urine, Urine, Clean Catch    Incontinence of feces, unspecified fecal incontinence type    Defecation urgency      Patient Instructions   63 YEAR OLD FEMALE WHO PRESENT TODAY FOR CPE AND IN FOLLOW UP OF HYPERLIPIDEMIA, VITAMIN D DEFICIENCY, AND MACROCYTOSIS. CPE COMPLETED TODAY. EKG IS SLIGHTLY ABNORMAL AND THERE IS NO COMPARISON EKG. I WILL REFER TO CARDIOLOGY FOR EVALUATION. PATIENT IS ASYMPTOMATIC, SO I WILL HAVE THEM DETERMINE BEST WORKUP. SHE IS OVERDUE FOR MAMMOGRAM AND DEXA. I WILL REFER TO [facility] FOR BREAST EXAM AND MAMMOGRAM AND FOR DEXA. SHE IS UP TO DATE WITH COLONOSCOPY WITH LAST COLONOSCOPY 2012- RECHECK IN 10 YEARS. SHE REPORTS TD ABOUT 5-10 YEARS AGO, HOWEVER, I HAVE NO RECORD OF TDAP. SHE WILL CALL INSURANCE TO DETERMINE LAST TD (AND LET ME KNOW IF SHE IS ABLE TO FIND OUT DATE). IF THEY CANNOT GIVE DATE, SHE SHOULD FIND OUT COVERAGE FOR TDAP, SO WE CAN UPDATE THIS IN OFFICE.     PATIENT IS ALSO DUE FOR FOLLOW UP OF HYPERLIPIDEMIA, VITAMIN D,  MACROCYTOSIS, AND MOODS. DUNCANTENT TO HAVE FASTING LABS TODAY- CALL IF NO RESULTS IN 1 WEEK. SHE REPORTS MOODS ARE OK  AND GERD HAS BEEN OK. SHE HAD RECENT EGD IN 5/2018 WITHOUT STRICTURE OR NEED FOR DILATION (HISTORY OF STRICTURE IN THE PAST). SHE WAS NOTED TO HAVE MEDIUM. MODERATE HIATAL HERNIA AND MUCOSA WAS SUGGESTIVE OF EOSINOPHILIC ESOPHAGITIS WITH NEGATIVE BIOPSY FOR EOSINOPHILS. HE SUGGESTED REPEAT EGD IN 3-5 YEARS.     OF NOTE, PATIENT HAS NOTED CHANGE IN BM IN THE PAST 2-3 MONTHS WITH INCREASED URGENCY AND FECAL INCONTINENCE. ALSO HAS STRESS FECAL INCONTINENCE. SHE HAS SIGNIFICANT HEMORRHOIDS ON EXAM, HOWEVER, SHE ALSO APPEAR TO HAVE POSSIBLE MILD RECTAL PROLAPSE AS WELL. SHE HAS NOTED DECREASED RECTAL TONE. PATIENT WOULD LIKE TO START WITH FOLLOW UP WITH DR UNDERWOOD. I WILL HAVE HER FOLLOW UP WITH HIM ASAP. IF NO FINDINGS WITH HIM, SHE WILL NEED MRI LUMBOSACRAL REGION AND CONSIDER FURTHER NEUROLOGICAL WORKUP. WE COULD ALSO CONSIDER PELVIC FLOOR STRENGTHENING PT. PATIENT WILL CALL AFTER SEEING DR UNDERWOOD, AND I WILL AWAIT NOTE. PATIENT TO LET ME KNOW IF SHE IS UNABLE TO GET APPT SCHEDULED.

## 2018-08-25 LAB
25(OH)D3+25(OH)D2 SERPL-MCNC: 36.2 NG/ML (ref 30–100)
ALBUMIN SERPL-MCNC: 4.5 G/DL (ref 3.5–5.2)
ALBUMIN/GLOB SERPL: 1.6 G/DL
ALP SERPL-CCNC: 80 U/L (ref 39–117)
ALT SERPL-CCNC: 26 U/L (ref 1–33)
AST SERPL-CCNC: 19 U/L (ref 1–32)
BASOPHILS # BLD AUTO: 0.05 10*3/MM3 (ref 0–0.2)
BASOPHILS NFR BLD AUTO: 0.6 % (ref 0–1.5)
BILIRUB SERPL-MCNC: 0.9 MG/DL (ref 0.1–1.2)
BUN SERPL-MCNC: 12 MG/DL (ref 8–23)
BUN/CREAT SERPL: 12.6 (ref 7–25)
CALCIUM SERPL-MCNC: 9.9 MG/DL (ref 8.6–10.5)
CHLORIDE SERPL-SCNC: 102 MMOL/L (ref 98–107)
CHOLEST SERPL-MCNC: 220 MG/DL (ref 0–200)
CO2 SERPL-SCNC: 26.5 MMOL/L (ref 22–29)
CREAT SERPL-MCNC: 0.95 MG/DL (ref 0.57–1)
EOSINOPHIL # BLD AUTO: 0.15 10*3/MM3 (ref 0–0.7)
EOSINOPHIL NFR BLD AUTO: 1.8 % (ref 0.3–6.2)
ERYTHROCYTE [DISTWIDTH] IN BLOOD BY AUTOMATED COUNT: 14.1 % (ref 11.7–13)
FOLATE SERPL-MCNC: 14.01 NG/ML (ref 4.78–24.2)
FT4I SERPL CALC-MCNC: 2 (ref 1.2–4.9)
GLOBULIN SER CALC-MCNC: 2.8 GM/DL
GLUCOSE SERPL-MCNC: 95 MG/DL (ref 65–99)
HCT VFR BLD AUTO: 44.5 % (ref 35.6–45.5)
HDLC SERPL-MCNC: 50 MG/DL (ref 40–60)
HGB BLD-MCNC: 14.5 G/DL (ref 11.9–15.5)
IMM GRANULOCYTES # BLD: 0.01 10*3/MM3 (ref 0–0.03)
IMM GRANULOCYTES NFR BLD: 0.1 % (ref 0–0.5)
LDLC SERPL CALC-MCNC: 142 MG/DL (ref 0–100)
LDLC/HDLC SERPL: 2.84 {RATIO}
LYMPHOCYTES # BLD AUTO: 2.94 10*3/MM3 (ref 0.9–4.8)
LYMPHOCYTES NFR BLD AUTO: 35.1 % (ref 19.6–45.3)
MCH RBC QN AUTO: 31.9 PG (ref 26.9–32)
MCHC RBC AUTO-ENTMCNC: 32.6 G/DL (ref 32.4–36.3)
MCV RBC AUTO: 97.8 FL (ref 80.5–98.2)
MONOCYTES # BLD AUTO: 0.65 10*3/MM3 (ref 0.2–1.2)
MONOCYTES NFR BLD AUTO: 7.8 % (ref 5–12)
NEUTROPHILS # BLD AUTO: 4.59 10*3/MM3 (ref 1.9–8.1)
NEUTROPHILS NFR BLD AUTO: 54.7 % (ref 42.7–76)
PLATELET # BLD AUTO: 206 10*3/MM3 (ref 140–500)
POTASSIUM SERPL-SCNC: 4.3 MMOL/L (ref 3.5–5.2)
PROT SERPL-MCNC: 7.3 G/DL (ref 6–8.5)
RBC # BLD AUTO: 4.55 10*6/MM3 (ref 3.9–5.2)
SODIUM SERPL-SCNC: 142 MMOL/L (ref 136–145)
T3RU NFR SERPL: 24 % (ref 24–39)
T4 SERPL-MCNC: 8.5 UG/DL (ref 4.5–12)
TRIGL SERPL-MCNC: 139 MG/DL (ref 0–150)
TSH SERPL DL<=0.005 MIU/L-ACNC: 0.81 UIU/ML (ref 0.45–4.5)
VIT B12 SERPL-MCNC: 346 PG/ML (ref 211–946)
VLDLC SERPL CALC-MCNC: 27.8 MG/DL (ref 5–40)
WBC # BLD AUTO: 8.38 10*3/MM3 (ref 4.5–10.7)

## 2018-08-27 PROBLEM — M25.561 BILATERAL CHRONIC KNEE PAIN: Status: ACTIVE | Noted: 2018-08-27

## 2018-08-27 PROBLEM — M25.562 BILATERAL CHRONIC KNEE PAIN: Status: ACTIVE | Noted: 2018-08-27

## 2018-08-27 PROBLEM — R94.31 ABNORMAL EKG: Status: ACTIVE | Noted: 2018-08-27

## 2018-08-27 PROBLEM — D75.89 MACROCYTOSIS: Status: ACTIVE | Noted: 2018-08-27

## 2018-08-27 PROBLEM — G89.29 BILATERAL CHRONIC KNEE PAIN: Status: ACTIVE | Noted: 2018-08-27

## 2018-08-27 NOTE — PATIENT INSTRUCTIONS
63 YEAR OLD FEMALE WHO PRESENT TODAY FOR CPE AND IN FOLLOW UP OF HYPERLIPIDEMIA, VITAMIN D DEFICIENCY, AND MACROCYTOSIS. CPE COMPLETED TODAY. EKG IS SLIGHTLY ABNORMAL AND THERE IS NO COMPARISON EKG. I WILL REFER TO CARDIOLOGY FOR EVALUATION. PATIENT IS ASYMPTOMATIC, SO I WILL HAVE THEM DETERMINE BEST WORKUP. SHE IS OVERDUE FOR MAMMOGRAM AND DEXA. I WILL REFER TO [facility] FOR BREAST EXAM AND MAMMOGRAM AND FOR DEXA. SHE IS UP TO DATE WITH COLONOSCOPY WITH LAST COLONOSCOPY 2012- RECHECK IN 10 YEARS. SHE REPORTS TD ABOUT 5-10 YEARS AGO, HOWEVER, I HAVE NO RECORD OF TDAP. SHE WILL CALL INSURANCE TO DETERMINE LAST TD (AND LET ME KNOW IF SHE IS ABLE TO FIND OUT DATE). IF THEY CANNOT GIVE DATE, SHE SHOULD FIND OUT COVERAGE FOR TDAP, SO WE CAN UPDATE THIS IN OFFICE.     PATIENT IS ALSO DUE FOR FOLLOW UP OF HYPERLIPIDEMIA, VITAMIN D,  MACROCYTOSIS, AND MOODS. PAITENT TO HAVE FASTING LABS TODAY- CALL IF NO RESULTS IN 1 WEEK. SHE REPORTS MOODS ARE OK AND GERD HAS BEEN OK. SHE HAD RECENT EGD IN 5/2018 WITHOUT STRICTURE OR NEED FOR DILATION (HISTORY OF STRICTURE IN THE PAST). SHE WAS NOTED TO HAVE MEDIUM. MODERATE HIATAL HERNIA AND MUCOSA WAS SUGGESTIVE OF EOSINOPHILIC ESOPHAGITIS WITH NEGATIVE BIOPSY FOR EOSINOPHILS. HE SUGGESTED REPEAT EGD IN 3-5 YEARS.     OF NOTE, PATIENT HAS NOTED CHANGE IN BM IN THE PAST 2-3 MONTHS WITH INCREASED URGENCY AND FECAL INCONTINENCE. ALSO HAS STRESS FECAL INCONTINENCE. SHE HAS SIGNIFICANT HEMORRHOIDS ON EXAM, HOWEVER, SHE ALSO APPEAR TO HAVE POSSIBLE MILD RECTAL PROLAPSE AS WELL. SHE HAS NOTED DECREASED RECTAL TONE. PATIENT WOULD LIKE TO START WITH FOLLOW UP WITH DR UNDERWOOD. I WILL HAVE HER FOLLOW UP WITH HIM ASAP. IF NO FINDINGS WITH HIM, SHE WILL NEED MRI LUMBOSACRAL REGION AND CONSIDER FURTHER NEUROLOGICAL WORKUP. WE COULD ALSO CONSIDER PELVIC FLOOR STRENGTHENING PT. PATIENT WILL CALL AFTER SEEING DR UNDERWOOD, AND I WILL AWAIT NOTE. PATIENT TO LET ME KNOW IF SHE IS UNABLE TO GET APPT  SCHEDULED.

## 2018-08-30 DIAGNOSIS — R31.9 URINARY TRACT INFECTION WITH HEMATURIA, SITE UNSPECIFIED: Primary | ICD-10-CM

## 2018-08-30 DIAGNOSIS — N39.0 URINARY TRACT INFECTION WITH HEMATURIA, SITE UNSPECIFIED: Primary | ICD-10-CM

## 2018-08-30 LAB
APPEARANCE UR: (no result)
BACTERIA #/AREA URNS HPF: ABNORMAL /HPF
BACTERIA UR CULT: ABNORMAL
BACTERIA UR CULT: ABNORMAL
BILIRUB UR QL STRIP: NEGATIVE
CASTS URNS MICRO: ABNORMAL
COLOR UR: YELLOW
EPI CELLS #/AREA URNS HPF: ABNORMAL /HPF
GLUCOSE UR QL: NEGATIVE
HGB UR QL STRIP: NEGATIVE
KETONES UR QL STRIP: NEGATIVE
LEUKOCYTE ESTERASE UR QL STRIP: (no result)
NITRITE UR QL STRIP: POSITIVE
OTHER ANTIBIOTIC SUSC ISLT: ABNORMAL
PH UR STRIP: 6 [PH] (ref 5–8)
PROT UR QL STRIP: NEGATIVE
RBC #/AREA URNS HPF: ABNORMAL /HPF
SP GR UR: 1.02 (ref 1–1.03)
UROBILINOGEN UR STRIP-MCNC: (no result) MG/DL
WBC #/AREA URNS HPF: ABNORMAL /HPF

## 2018-08-30 RX ORDER — NITROFURANTOIN 25; 75 MG/1; MG/1
100 CAPSULE ORAL 2 TIMES DAILY
Qty: 14 CAPSULE | Refills: 0 | Status: SHIPPED | OUTPATIENT
Start: 2018-08-30 | End: 2018-09-19 | Stop reason: ALTCHOICE

## 2018-09-07 ENCOUNTER — APPOINTMENT (OUTPATIENT)
Dept: WOMENS IMAGING | Facility: HOSPITAL | Age: 63
End: 2018-09-07

## 2018-09-07 PROCEDURE — 77080 DXA BONE DENSITY AXIAL: CPT | Performed by: RADIOLOGY

## 2018-09-07 PROCEDURE — 77067 SCR MAMMO BI INCL CAD: CPT | Performed by: RADIOLOGY

## 2018-09-07 PROCEDURE — 77063 BREAST TOMOSYNTHESIS BI: CPT | Performed by: RADIOLOGY

## 2018-09-09 ENCOUNTER — TELEPHONE (OUTPATIENT)
Dept: FAMILY MEDICINE CLINIC | Facility: CLINIC | Age: 63
End: 2018-09-09

## 2018-09-09 NOTE — TELEPHONE ENCOUNTER
PLEASE FIND OUT IF THEY ARE DOING A NERVE BLOCK OR IF THEY ARE SEDATING THE PATIENT.  IF SHE IS UNDERGOING ANESTHESIA AND NOT JUST A BLOCK, SHE SHOULD SEE IF SHE CAN SEE CARDIOLOGY PRIOR TO SURGERY. PLEASE LET ME KNOW ABOUT SURGERY SPECIFICS AND I WILL MAKE FURTHER RECOMMENDATIONS.    01-Jul-2017

## 2018-09-10 NOTE — TELEPHONE ENCOUNTER
Per [facility] ([phone]) patient is scheduled to have light sedation and an axillary block

## 2018-09-19 ENCOUNTER — OFFICE VISIT (OUTPATIENT)
Dept: CARDIOLOGY | Facility: CLINIC | Age: 63
End: 2018-09-19

## 2018-09-19 VITALS
SYSTOLIC BLOOD PRESSURE: 124 MMHG | WEIGHT: 200 LBS | DIASTOLIC BLOOD PRESSURE: 80 MMHG | HEART RATE: 66 BPM | HEIGHT: 63 IN | BODY MASS INDEX: 35.44 KG/M2

## 2018-09-19 DIAGNOSIS — E78.2 MIXED HYPERLIPIDEMIA: ICD-10-CM

## 2018-09-19 DIAGNOSIS — IMO0001 OBESITY, CLASS II, BMI 35.0-39.9, WITH COMORBIDITY (SEE ACTUAL BMI): ICD-10-CM

## 2018-09-19 DIAGNOSIS — Z01.818 PRE-OP EVALUATION: Primary | ICD-10-CM

## 2018-09-19 DIAGNOSIS — Z91.89 CARDIOVASCULAR EVENT RISK: ICD-10-CM

## 2018-09-19 PROCEDURE — 93000 ELECTROCARDIOGRAM COMPLETE: CPT | Performed by: INTERNAL MEDICINE

## 2018-09-19 PROCEDURE — 99203 OFFICE O/P NEW LOW 30 MIN: CPT | Performed by: INTERNAL MEDICINE

## 2018-09-19 NOTE — PROGRESS NOTES
Date of Office Visit: 18  Encounter Provider: Tremaine Herrera MD  Place of Service: [facility] CARDIOLOG[facility]  Patient Name: Virginia Leiva  :1955  Referral Provider:Kacey Hernadez PA      No chief complaint on file.    History of Present Illness  Mrs Leiva is a pleasant 62 yo female with history of arthritis and hyperlipidemia.  Now needing preoperative clearance for thumb surgery.  She went for ECG reportedly that was abnormal and then sent here.  She denies any complaints of chest pain or pressure.  She does have some shortness of breath if she walks up an incline or walks any distance but she is also bothered by her knees.  Denies any orthopnea or PND.  Denies any palpitations, near-syncope or syncope.  Denies any history of rheumatic fever, heart attack, heart failure, or heart murmur.  No stroke type symptoms.  She really doesn't do any structured exercising.  Overall she is no she needs to do more activity.          Past Medical History:   Diagnosis Date   • Anxiety    • Arthritis    • GERD (gastroesophageal reflux disease)    • Glaucoma          Past Surgical History:   Procedure Laterality Date   • BLADDER REPAIR      tack    • COLONOSCOPY     • ENDOSCOPY         • ENDOSCOPY N/A 2018    Procedure: ESOPHAGOGASTRODUODENOSCOPY WITH COLD BIOPSIES;  Surgeon: Yobani Covarrubias MD;  Location: [facility] ENDOSCOPY;  Service: Gastroenterology   • HYSTERECTOMY      with bladder tack    • TUBAL ABDOMINAL LIGATION           Current Outpatient Prescriptions on File Prior to Visit   Medication Sig Dispense Refill   • acetaminophen (TYLENOL) 500 MG tablet Take 500 mg by mouth 2 (Two) Times a Day.     • aspirin 81 MG tablet Take 81 mg by mouth.     • cholecalciferol (VITAMIN D3) 1000 UNITS tablet Take 1,000 Units by mouth Daily.     • FLUoxetine (PROzac) 20 MG capsule TAKE 1 CAPSULE DAILY. 90 capsule 0   • nitrofurantoin, macrocrystal-monohydrate, (MACROBID) 100 MG  capsule Take 1 capsule by mouth 2 (Two) Times a Day. 14 capsule 0   • omeprazole (priLOSEC) 20 MG capsule Take 20 mg by mouth Daily.       No current facility-administered medications on file prior to visit.          Social History     Social History   • Marital status:      Spouse name: CHAVEZ   • Number of children: 2   • Years of education: N/A     Occupational History   • RETIRED       Social History Main Topics   • Smoking status: Never Smoker   • Smokeless tobacco: Never Used   • Alcohol use No   • Drug use: No   • Sexual activity: Defer     Other Topics Concern   • Not on file     Social History Narrative   • No narrative on file       Family History   Problem Relation Age of Onset   • Cancer Mother         stomach   • Arthritis Mother    • Cancer Father         pancreatic   • Aortic aneurysm Father    • Cancer Maternal Grandfather         LUNG   • Cancer Paternal Grandmother         OVARIAN   • Malig Hyperthermia Neg Hx          Review of Systems   Constitution: Negative for decreased appetite, diaphoresis, fever, weakness, malaise/fatigue, weight gain and weight loss.   HENT: Negative for congestion, hearing loss, nosebleeds and tinnitus.    Eyes: Negative for blurred vision, double vision, vision loss in left eye, vision loss in right eye and visual disturbance.   Cardiovascular:        As noted in HPI   Respiratory:        As noted HPI   Endocrine: Negative for cold intolerance and heat intolerance.   Hematologic/Lymphatic: Negative for bleeding problem. Does not bruise/bleed easily.   Skin: Negative for color change, flushing, itching and rash.   Musculoskeletal: Positive for joint pain. Negative for arthritis, back pain, joint swelling, muscle weakness and myalgias.   Gastrointestinal: Negative for bloating, abdominal pain, constipation, diarrhea, dysphagia, heartburn, hematemesis, hematochezia, melena, nausea and vomiting.   Genitourinary: Negative for bladder incontinence,  dysuria, frequency, nocturia and urgency.   Neurological: Negative for dizziness, focal weakness, headaches, light-headedness, loss of balance, numbness, paresthesias and vertigo.   Psychiatric/Behavioral: Negative for depression, memory loss and substance abuse. The patient is nervous/anxious.        Procedures      ECG 12 Lead  Date/Time: 9/19/2018 8:52 AM  Performed by: TARA ANNE  Authorized by: TARA ANNE   Comparison: compared with previous ECG   Similar to previous ECG  Rhythm: sinus rhythm  Rate: normal  QRS axis: normal                  Objective:    There were no vitals taken for this visit.       Physical Exam  Physical Exam   Constitutional: She is oriented to person, place, and time. She appears well-developed and well-nourished. No distress.   HENT:   Head: Normocephalic.   Eyes: Pupils are equal, round, and reactive to light. Conjunctivae are normal. No scleral icterus.   Neck: Normal carotid pulses, no hepatojugular reflux and no JVD present. Carotid bruit is not present. No tracheal deviation, no edema and no erythema present. No thyromegaly present.   Cardiovascular: Normal rate, regular rhythm, S1 normal, S2 normal, normal heart sounds and intact distal pulses.   No extrasystoles are present. PMI is not displaced.  Exam reveals no gallop, no distant heart sounds and no friction rub.    No murmur heard.  Pulses:       Carotid pulses are 2+ on the right side, and 2+ on the left side.       Radial pulses are 2+ on the right side, and 2+ on the left side.        Femoral pulses are 2+ on the right side, and 2+ on the left side.       Dorsalis pedis pulses are 2+ on the right side, and 2+ on the left side.        Posterior tibial pulses are 2+ on the right side, and 2+ on the left side.   Pulmonary/Chest: Effort normal and breath sounds normal. No respiratory distress. She has no decreased breath sounds. She has no wheezes. She has no rhonchi. She has no rales. She exhibits no tenderness.    Abdominal: Soft. Bowel sounds are normal. She exhibits no distension and no mass. There is no hepatosplenomegaly. There is no tenderness. There is no rebound and no guarding.   Musculoskeletal: She exhibits no edema, tenderness or deformity.   Neurological: She is alert and oriented to person, place, and time.   Skin: Skin is warm and dry. No rash noted. She is not diaphoretic. No cyanosis or erythema. No pallor. Nails show no clubbing.   Psychiatric: She has a normal mood and affect. Her speech is normal and behavior is normal. Judgment and thought content normal.           Assessment:    1.  63-year-old female here for preoperative clearance.  I believe her ECG is normal.  Her ECG from today is normal.  Her surgery is low risk therefore no further workup would be needed.  But in addition her Alon's revised risk is very low.  Therefore no further cardiovascular evaluation or treatment is needed.    2.  Hyperlipidemia/cardiovascular risk.  Her LDL was 142.  But her American College of cardiology risk of stroke or heart attack in the next 10 years is intermediate at 4.3% compared age matched control of 2.9%.  Based on those criteria she would benefit from low-dose statin therapy however I believe if she were to diet and exercise and hit the American Heart Association recommended 40 minutes of moderate activity 4 days a week her risk would substantially drop.  I would suggest her doing that for the next 3-6 months rechecking her lipids and recounted in her risk.  3.  Obesity body mass index of 35 as outlined above.       Plan:

## 2018-10-03 ENCOUNTER — TELEPHONE (OUTPATIENT)
Dept: FAMILY MEDICINE CLINIC | Facility: CLINIC | Age: 63
End: 2018-10-03

## 2018-10-03 NOTE — TELEPHONE ENCOUNTER
----- Message from Virginia Leiva sent at 10/3/2018  2:33 PM EDT -----  Regarding: Non-Urgent Medical Question  Contact: [phone]  I did go get my mammogram and bone density test. I got a letter in the mail about my mammogram but nothing about the bone density. It looks like you all didn't get it either.    I got it done September 8.     Also they gave me a flu shot yesterday at the hospital.

## 2018-10-04 DIAGNOSIS — K22.2 ESOPHAGEAL STRICTURE: ICD-10-CM

## 2018-10-04 DIAGNOSIS — K21.9 GASTROESOPHAGEAL REFLUX DISEASE, ESOPHAGITIS PRESENCE NOT SPECIFIED: ICD-10-CM

## 2018-10-04 RX ORDER — OMEPRAZOLE 20 MG/1
CAPSULE, DELAYED RELEASE ORAL
Qty: 90 CAPSULE | Refills: 1 | Status: SHIPPED | OUTPATIENT
Start: 2018-10-04 | End: 2019-04-03 | Stop reason: SDUPTHER

## 2018-11-04 DIAGNOSIS — K21.9 GASTROESOPHAGEAL REFLUX DISEASE, ESOPHAGITIS PRESENCE NOT SPECIFIED: ICD-10-CM

## 2018-11-04 DIAGNOSIS — K22.2 ESOPHAGEAL STRICTURE: ICD-10-CM

## 2018-11-05 RX ORDER — FLUOXETINE HYDROCHLORIDE 20 MG/1
CAPSULE ORAL
Qty: 90 CAPSULE | Refills: 0 | Status: SHIPPED | OUTPATIENT
Start: 2018-11-05 | End: 2019-01-23 | Stop reason: SDUPTHER

## 2019-01-23 DIAGNOSIS — K21.9 GASTROESOPHAGEAL REFLUX DISEASE, ESOPHAGITIS PRESENCE NOT SPECIFIED: ICD-10-CM

## 2019-01-23 DIAGNOSIS — K22.2 ESOPHAGEAL STRICTURE: ICD-10-CM

## 2019-01-23 RX ORDER — FLUOXETINE HYDROCHLORIDE 20 MG/1
CAPSULE ORAL
Qty: 90 CAPSULE | Refills: 0 | Status: SHIPPED | OUTPATIENT
Start: 2019-01-23 | End: 2019-04-03 | Stop reason: SDUPTHER

## 2019-01-28 ENCOUNTER — OFFICE VISIT (OUTPATIENT)
Dept: FAMILY MEDICINE CLINIC | Facility: CLINIC | Age: 64
End: 2019-01-28

## 2019-01-28 VITALS
DIASTOLIC BLOOD PRESSURE: 70 MMHG | HEIGHT: 63 IN | TEMPERATURE: 98.7 F | OXYGEN SATURATION: 98 % | BODY MASS INDEX: 35.44 KG/M2 | WEIGHT: 200 LBS | HEART RATE: 71 BPM | SYSTOLIC BLOOD PRESSURE: 130 MMHG

## 2019-01-28 DIAGNOSIS — F41.9 ANXIETY: ICD-10-CM

## 2019-01-28 DIAGNOSIS — E55.9 VITAMIN D DEFICIENCY: ICD-10-CM

## 2019-01-28 DIAGNOSIS — K21.9 GASTROESOPHAGEAL REFLUX DISEASE, ESOPHAGITIS PRESENCE NOT SPECIFIED: ICD-10-CM

## 2019-01-28 DIAGNOSIS — E53.8 B12 DEFICIENCY: ICD-10-CM

## 2019-01-28 DIAGNOSIS — Z87.440 HISTORY OF UTI: ICD-10-CM

## 2019-01-28 DIAGNOSIS — E78.49 OTHER HYPERLIPIDEMIA: Primary | ICD-10-CM

## 2019-01-28 DIAGNOSIS — D75.89 MACROCYTOSIS: ICD-10-CM

## 2019-01-28 DIAGNOSIS — Z23 NEED FOR PNEUMOCOCCAL VACCINATION: ICD-10-CM

## 2019-01-28 PROCEDURE — 90670 PCV13 VACCINE IM: CPT | Performed by: PHYSICIAN ASSISTANT

## 2019-01-28 PROCEDURE — 99214 OFFICE O/P EST MOD 30 MIN: CPT | Performed by: PHYSICIAN ASSISTANT

## 2019-01-28 PROCEDURE — 90471 IMMUNIZATION ADMIN: CPT | Performed by: PHYSICIAN ASSISTANT

## 2019-01-28 RX ORDER — HEPATITIS A VACCINE 1440 [IU]/ML
INJECTION, SUSPENSION INTRAMUSCULAR
Refills: 1 | COMMUNITY
Start: 2018-11-28 | End: 2019-01-28

## 2019-01-28 RX ORDER — UBIDECARENONE 75 MG
50 CAPSULE ORAL DAILY
COMMUNITY
End: 2020-05-22

## 2019-01-28 NOTE — PROGRESS NOTES
Subjective   Virginia Leiva is a 63 y.o. female. Patient here today for medication check for hyperlipidemia, vitamin D deficiency, anxiety     History of Present Illness     Rectal tone and bowel issues are not as bad.     Had Hep A #2- 10-11/2018. Had flu shot in October. Needs Pneumococcal vaccine. Has never had pneumococcal.     No other concerns. Otherwise, feeling ok. Tolerating medications well.     The following portions of the patient's history were reviewed and updated as appropriate: allergies, current medications, past family history, past medical history, past social history, past surgical history and problem list.    Review of Systems   All other systems reviewed and are negative.      Objective   Physical Exam   Constitutional: She is oriented to person, place, and time. She appears well-developed and well-nourished.   HENT:   Head: Normocephalic and atraumatic.   Right Ear: External ear normal.   Left Ear: External ear normal.   Nose: Nose normal.   Eyes: Conjunctivae and lids are normal.   Neck: Neck supple. Carotid bruit is not present.   Cardiovascular: Normal rate, regular rhythm, normal heart sounds and intact distal pulses. Exam reveals no gallop and no friction rub.   No murmur heard.  Pulmonary/Chest: Effort normal and breath sounds normal. No respiratory distress. She has no wheezes. She has no rhonchi. She has no rales.   Musculoskeletal: She exhibits no edema or deformity.   Neurological: She is alert and oriented to person, place, and time. Gait normal.   Skin: Skin is warm and dry.   Psychiatric: She has a normal mood and affect. Her speech is normal and behavior is normal. Judgment and thought content normal. Cognition and memory are normal.   Nursing note and vitals reviewed.      Assessment/Plan   Virginia was seen today for med management.    Diagnoses and all orders for this visit:    Other hyperlipidemia  -     Comprehensive Metabolic Panel  -     CK  -     Lipid Panel With LDL / HDL  Ratio  -     Thyroid Panel With TSH    Vitamin D deficiency  -     Comprehensive Metabolic Panel  -     Vitamin D 25 Hydroxy    Macrocytosis  -     Thyroid Panel With TSH    Anxiety  -     Thyroid Panel With TSH    Gastroesophageal reflux disease, esophagitis presence not specified    History of UTI  -     POC Urinalysis Dipstick, Automated    B12 deficiency  -     CBC & Differential  -     Vitamin B12 & Folate  -     Thyroid Panel With TSH    Need for pneumococcal vaccination  -     Pneumococcal Conjugate Vaccine 13-Valent All (PCV13)      Patient Instructions   63 YEAR OLD FEMALE WHO PRESENTS TODAY IN FOLLOW UP OF HYPERLIPIDEMIA, VITAMIN D,  MACROCYTOSIS, B12 DEFICIENCY, AND MOODS. PATIENT TO HAVE FASTING LABS TODAY- CALL IF NO RESULTS IN 1 WEEK. SHE REPORTS MOODS ARE OK AND GERD HAS BEEN OK. SHE HAD RECENT EGD IN 5/2018 WITHOUT STRICTURE OR NEED FOR DILATION (HISTORY OF STRICTURE IN THE PAST). SHE WAS NOTED TO HAVE MEDIUM, MODERATE HIATAL HERNIA AND MUCOSA WAS SUGGESTIVE OF EOSINOPHILIC ESOPHAGITIS WITH NEGATIVE BIOPSY FOR EOSINOPHILS. HE SUGGESTED REPEAT EGD IN 3-5 YEARS.     SHE HAD RIGHT MCP SURGERY 10/2018 AND LEFT MCP SURGERY 12/17/18. SHE IS RECOVERING WELL AND WORKING ON STRENGTHENING RIGHT HAND. LEFT CAST WILL BE REMOVED THIS WEEK. KEEP FOLLOW UP WITH HAND SURGEON AS DIRECTED.      OF NOTE, PATIENT NOTED CHANGE IN BM IN 8/2018 WITH INCREASED URGENCY AND FECAL INCONTINENCE. ALSO HAD STRESS FECAL INCONTINENCE. SHE HAD SIGNIFICANT HEMORRHOIDS ON EXAM AND POSSIBLE MILD RECTAL PROLAPSE AS WELL. SHE HAD NOTED DECREASED RECTAL TONE. PATIENT WANTED TO START WITH FOLLOW UP WITH DR UNDERWOOD. SHE HAS NOT YET FOLLOWED UP, HOWEVER, SHE REPORTS SYMPTOMS HAVE IMPROVED SOME. IF NO FINDINGS WITH HIM, SHE WILL NEED MRI LUMBOSACRAL REGION AND CONSIDER FURTHER NEUROLOGICAL WORKUP. WE COULD ALSO CONSIDER PELVIC FLOOR STRENGTHENING PT. PATIENT WILL CALL AFTER SEEING DR UNDERWOOD, AND I WILL AWAIT NOTE. PATIENT TO LET ME KNOW IF SHE  IS UNABLE TO GET APPT SCHEDULED.

## 2019-01-29 LAB
25(OH)D3+25(OH)D2 SERPL-MCNC: 32.1 NG/ML (ref 30–100)
ALBUMIN SERPL-MCNC: 4.7 G/DL (ref 3.5–5.2)
ALBUMIN/GLOB SERPL: 1.7 G/DL
ALP SERPL-CCNC: 85 U/L (ref 39–117)
ALT SERPL-CCNC: 19 U/L (ref 1–33)
AST SERPL-CCNC: 15 U/L (ref 1–32)
BASOPHILS # BLD AUTO: 0.04 10*3/MM3 (ref 0–0.2)
BASOPHILS NFR BLD AUTO: 0.5 % (ref 0–1.5)
BILIRUB SERPL-MCNC: 0.7 MG/DL (ref 0.1–1.2)
BUN SERPL-MCNC: 14 MG/DL (ref 8–23)
BUN/CREAT SERPL: 14.6 (ref 7–25)
CALCIUM SERPL-MCNC: 10.1 MG/DL (ref 8.6–10.5)
CHLORIDE SERPL-SCNC: 101 MMOL/L (ref 98–107)
CHOLEST SERPL-MCNC: 230 MG/DL (ref 0–200)
CK SERPL-CCNC: 62 U/L (ref 20–180)
CO2 SERPL-SCNC: 25.6 MMOL/L (ref 22–29)
CREAT SERPL-MCNC: 0.96 MG/DL (ref 0.57–1)
EOSINOPHIL # BLD AUTO: 0.17 10*3/MM3 (ref 0–0.7)
EOSINOPHIL NFR BLD AUTO: 2 % (ref 0.3–6.2)
ERYTHROCYTE [DISTWIDTH] IN BLOOD BY AUTOMATED COUNT: 14.3 % (ref 11.7–13)
FOLATE SERPL-MCNC: 6.88 NG/ML (ref 4.78–24.2)
FT4I SERPL CALC-MCNC: 1.8 (ref 1.2–4.9)
GLOBULIN SER CALC-MCNC: 2.8 GM/DL
GLUCOSE SERPL-MCNC: 103 MG/DL (ref 65–99)
HCT VFR BLD AUTO: 45.9 % (ref 35.6–45.5)
HDLC SERPL-MCNC: 55 MG/DL (ref 40–60)
HGB BLD-MCNC: 14.5 G/DL (ref 11.9–15.5)
IMM GRANULOCYTES # BLD AUTO: 0.01 10*3/MM3 (ref 0–0.03)
IMM GRANULOCYTES NFR BLD AUTO: 0.1 % (ref 0–0.5)
LDLC SERPL CALC-MCNC: 147 MG/DL (ref 0–100)
LDLC/HDLC SERPL: 2.67 {RATIO}
LYMPHOCYTES # BLD AUTO: 2.7 10*3/MM3 (ref 0.9–4.8)
LYMPHOCYTES NFR BLD AUTO: 31.4 % (ref 19.6–45.3)
MCH RBC QN AUTO: 30.5 PG (ref 26.9–32)
MCHC RBC AUTO-ENTMCNC: 31.6 G/DL (ref 32.4–36.3)
MCV RBC AUTO: 96.4 FL (ref 80.5–98.2)
MONOCYTES # BLD AUTO: 0.57 10*3/MM3 (ref 0.2–1.2)
MONOCYTES NFR BLD AUTO: 6.6 % (ref 5–12)
NEUTROPHILS # BLD AUTO: 5.12 10*3/MM3 (ref 1.9–8.1)
NEUTROPHILS NFR BLD AUTO: 59.5 % (ref 42.7–76)
PLATELET # BLD AUTO: 217 10*3/MM3 (ref 140–500)
POTASSIUM SERPL-SCNC: 4.4 MMOL/L (ref 3.5–5.2)
PROT SERPL-MCNC: 7.5 G/DL (ref 6–8.5)
RBC # BLD AUTO: 4.76 10*6/MM3 (ref 3.9–5.2)
SODIUM SERPL-SCNC: 140 MMOL/L (ref 136–145)
T3RU NFR SERPL: 23 % (ref 24–39)
T4 SERPL-MCNC: 7.8 UG/DL (ref 4.5–12)
TRIGL SERPL-MCNC: 140 MG/DL (ref 0–150)
TSH SERPL DL<=0.005 MIU/L-ACNC: 0.76 UIU/ML (ref 0.45–4.5)
VIT B12 SERPL-MCNC: 438 PG/ML (ref 211–946)
VLDLC SERPL CALC-MCNC: 28 MG/DL (ref 5–40)
WBC # BLD AUTO: 8.6 10*3/MM3 (ref 4.5–10.7)

## 2019-04-02 ENCOUNTER — OFFICE VISIT (OUTPATIENT)
Dept: FAMILY MEDICINE CLINIC | Facility: CLINIC | Age: 64
End: 2019-04-02

## 2019-04-02 VITALS
HEIGHT: 63 IN | DIASTOLIC BLOOD PRESSURE: 82 MMHG | BODY MASS INDEX: 35.08 KG/M2 | RESPIRATION RATE: 16 BRPM | TEMPERATURE: 98.2 F | OXYGEN SATURATION: 98 % | HEART RATE: 82 BPM | SYSTOLIC BLOOD PRESSURE: 116 MMHG | WEIGHT: 198 LBS

## 2019-04-02 DIAGNOSIS — M54.40 ACUTE RIGHT-SIDED LOW BACK PAIN WITH SCIATICA, SCIATICA LATERALITY UNSPECIFIED: Primary | ICD-10-CM

## 2019-04-02 PROCEDURE — 96372 THER/PROPH/DIAG INJ SC/IM: CPT | Performed by: FAMILY MEDICINE

## 2019-04-02 PROCEDURE — 99214 OFFICE O/P EST MOD 30 MIN: CPT | Performed by: FAMILY MEDICINE

## 2019-04-02 RX ORDER — KETOROLAC TROMETHAMINE 30 MG/ML
30 INJECTION, SOLUTION INTRAMUSCULAR; INTRAVENOUS ONCE
Status: COMPLETED | OUTPATIENT
Start: 2019-04-02 | End: 2019-04-02

## 2019-04-02 RX ORDER — KETOROLAC TROMETHAMINE 30 MG/ML
60 INJECTION, SOLUTION INTRAMUSCULAR; INTRAVENOUS ONCE
Status: DISCONTINUED | OUTPATIENT
Start: 2019-04-02 | End: 2019-04-02

## 2019-04-02 RX ORDER — PREDNISONE 20 MG/1
40 TABLET ORAL DAILY
Qty: 10 TABLET | Refills: 0 | Status: SHIPPED | OUTPATIENT
Start: 2019-04-02 | End: 2019-07-12

## 2019-04-02 RX ORDER — CYCLOBENZAPRINE HCL 10 MG
10 TABLET ORAL 3 TIMES DAILY PRN
Qty: 30 TABLET | Refills: 2 | Status: SHIPPED | OUTPATIENT
Start: 2019-04-02 | End: 2020-10-30

## 2019-04-02 RX ADMIN — KETOROLAC TROMETHAMINE 30 MG: 30 INJECTION, SOLUTION INTRAMUSCULAR; INTRAVENOUS at 16:32

## 2019-04-02 NOTE — PROGRESS NOTES
Subjective   Virginia Leiva is a 63 y.o. female. Presents today to be evaluated for low back pain x 1 week.     History of Present Illness     New patient to me    Back Pain  Patient presents for evaluation of low back problems. Symptoms have been present for 1 week and include pain in lumbar spine and right gluteal area (aching, boring, burning and sharp in character; 7/10 in severity). Initial inciting event: none. Symptoms are worse Intermittent through the day. Alleviating factors identifiable by the patient are none. Aggravating factors identifiable by the patient are bending backwards, bending forwards, bending sideways and standing. Treatments initiated by the patient: heat. Previous lower back problems: Bulging disc causing sciatica. Previous work up: Some imaging. Previous treatments: Muscle relaxers and physical therapy.      The following portions of the patient's history were reviewed and updated as appropriate: allergies, current medications, past family history, past medical history, past social history, past surgical history and problem list.    Review of Systems   Constitutional: Negative for fatigue and fever.   Respiratory: Negative for shortness of breath and wheezing.    Cardiovascular: Negative for chest pain and palpitations.   Gastrointestinal: Negative for constipation and nausea.   Musculoskeletal: Positive for back pain.       Objective   Physical Exam   Constitutional: She appears well-developed and well-nourished. No distress.   Cardiovascular: Normal rate, regular rhythm and normal heart sounds. Exam reveals no gallop and no friction rub.   No murmur heard.  Pulmonary/Chest: Effort normal and breath sounds normal. She has no wheezes. She has no rales.   Musculoskeletal:        Cervical back: Normal.        Thoracic back: Normal.        Lumbar back: She exhibits decreased range of motion, pain and spasm.   Neurological: No sensory deficit. She exhibits normal muscle tone. Coordination  normal.   Skin: Skin is warm. Capillary refill takes less than 2 seconds. She is not diaphoretic.   Nursing note and vitals reviewed.      Assessment/Plan   Virginia was seen today for back pain.    Diagnoses and all orders for this visit:    Acute right-sided low back pain with sciatica, sciatica laterality unspecified  -     Discontinue: ketorolac (TORADOL) injection 60 mg  -     cyclobenzaprine (FLEXERIL) 10 MG tablet; Take 1 tablet by mouth 3 (Three) Times a Day As Needed for Muscle Spasms.  -     predniSONE (DELTASONE) 20 MG tablet; Take 2 tablets by mouth Daily.  -     ketorolac (TORADOL) injection 30 mg  -     ketorolac (TORADOL) injection 30 mg        I will start with a Toradol shot 60 mg and send her in some Flexeril for the muscle spasms.  I want her to start some prednisone 40 mg tomorrow for 5 days total and see if we can get her out of this acute flare.  If it continues I will probably recommend another trial of physical therapy.

## 2019-04-02 NOTE — PATIENT INSTRUCTIONS
Acute Back Pain, Adult  Acute back pain is sudden and usually short-lived. It is often caused by an injury to the muscles and tissues in the back. The injury may result from:  · A muscle or ligament getting overstretched or torn (strained). Ligaments are tissues that connect bones to each other. Lifting something improperly can cause a back strain.  · Wear and tear (degeneration) of the spinal disks. Spinal disks are circular tissue that provides cushioning between the bones of the spine (vertebrae).  · Twisting motions, such as while playing sports or doing yard work.  · A hit to the back.  · Arthritis.    You may have a physical exam, lab tests, and imaging tests to find the cause of your pain. Acute back pain usually goes away with rest and home care.  Follow these instructions at home:  Managing pain, stiffness, and swelling  · Take over-the-counter and prescription medicines only as told by your health care provider.  · Your health care provider may recommend applying ice during the first 24-48 hours after your pain starts. To do this:  ? Put ice in a plastic bag.  ? Place a towel between your skin and the bag.  ? Leave the ice on for 20 minutes, 2-3 times a day.  · If directed, apply heat to the affected area as often as told by your health care provider. Use the heat source that your health care provider recommends, such as a moist heat pack or a heating pad.  ? Place a towel between your skin and the heat source.  ? Leave the heat on for 20-30 minutes.  ? Remove the heat if your skin turns bright red. This is especially important if you are unable to feel pain, heat, or cold. You have a greater risk of getting burned.  Activity  · Do not stay in bed. Staying in bed for more than 1-2 days can delay your recovery.  · Sit up and stand up straight. Avoid leaning forward when you sit, or hunching over when you stand.  ? If you work at a desk, sit close to it so you do not need to lean over. Keep your chin tucked  "in. Keep your neck drawn back, and keep your elbows bent at a right angle. Your arms should look like the letter \"L.\"  ? Sit high and close to the steering wheel when you drive. Add lower back (lumbar) support to your car seat, if needed.  · Take short walks on even surfaces as soon as you are able. Try to increase the length of time you walk each day.  · Do not sit, drive, or  one place for more than 30 minutes at a time. Sitting or standing for long periods of time can put stress on your back.  · Do not drive or use heavy machinery while taking prescription pain medicine.  · Use proper lifting techniques. When you bend and lift, use positions that put less stress on your back:  ? Bend your knees.  ? Keep the load close to your body.  ? Avoid twisting.  · Exercise regularly as told by your health care provider. Exercising helps your back heal faster and helps prevent back injuries by keeping muscles strong and flexible.  · Work with a physical therapist to make a safe exercise program, as recommended by your health care provider. Do any exercises as told by your physical therapist.  Lifestyle  · Maintain a healthy weight. Extra weight puts stress on your back and makes it difficult to have good posture.  · Avoid activities or situations that make you feel anxious or stressed. Stress and anxiety increase muscle tension and can make back pain worse. Learn ways to manage anxiety and stress, such as through exercise.  General instructions  · Sleep on a firm mattress in a comfortable position. Try lying on your side with your knees slightly bent. If you lie on your back, put a pillow under your knees.  · Follow your treatment plan as told by your health care provider. This may include:  ? Cognitive or behavioral therapy.  ? Acupuncture or massage therapy.  ? Meditation or yoga.  Contact a health care provider if:  · You have pain that is not relieved with rest or medicine.  · You have increasing pain going down " into your legs or buttocks.  · Your pain does not improve after 2 weeks.  · You have pain at night.  · You lose weight without trying.  · You have a fever or chills.  Get help right away if:  · You develop new bowel or bladder control problems.  · You have unusual weakness or numbness in your arms or legs.  · You develop nausea or vomiting.  · You develop abdominal pain.  · You feel faint.  Summary  · Acute back pain is sudden and usually short-lived.  · Use proper lifting techniques. When you bend and lift, use positions that put less stress on your back.  · Take over-the-counter and prescription medicines and apply heat or ice as directed by your health care provider.  This information is not intended to replace advice given to you by your health care provider. Make sure you discuss any questions you have with your health care provider.  Document Released: 12/18/2006 Document Revised: 08/01/2018 Document Reviewed: 08/01/2018  "[employer] Interactive Patient Education © 2019 "[employer] Inc.

## 2019-04-03 DIAGNOSIS — K22.2 ESOPHAGEAL STRICTURE: ICD-10-CM

## 2019-04-03 DIAGNOSIS — K21.9 GASTROESOPHAGEAL REFLUX DISEASE, ESOPHAGITIS PRESENCE NOT SPECIFIED: ICD-10-CM

## 2019-04-03 RX ORDER — OMEPRAZOLE 20 MG/1
CAPSULE, DELAYED RELEASE ORAL
Qty: 90 CAPSULE | Refills: 0 | Status: SHIPPED | OUTPATIENT
Start: 2019-04-03 | End: 2019-06-22 | Stop reason: SDUPTHER

## 2019-04-03 RX ORDER — FLUOXETINE HYDROCHLORIDE 20 MG/1
CAPSULE ORAL
Qty: 90 CAPSULE | Refills: 0 | Status: SHIPPED | OUTPATIENT
Start: 2019-04-03 | End: 2019-09-13 | Stop reason: SDUPTHER

## 2019-04-04 DIAGNOSIS — K22.2 ESOPHAGEAL STRICTURE: ICD-10-CM

## 2019-04-04 DIAGNOSIS — K21.9 GASTROESOPHAGEAL REFLUX DISEASE, ESOPHAGITIS PRESENCE NOT SPECIFIED: ICD-10-CM

## 2019-04-04 RX ORDER — FLUOXETINE HYDROCHLORIDE 20 MG/1
CAPSULE ORAL
Qty: 90 CAPSULE | Refills: 0 | OUTPATIENT
Start: 2019-04-04

## 2019-06-22 DIAGNOSIS — K21.9 GASTROESOPHAGEAL REFLUX DISEASE, ESOPHAGITIS PRESENCE NOT SPECIFIED: ICD-10-CM

## 2019-06-22 DIAGNOSIS — K22.2 ESOPHAGEAL STRICTURE: ICD-10-CM

## 2019-06-24 RX ORDER — OMEPRAZOLE 20 MG/1
CAPSULE, DELAYED RELEASE ORAL
Qty: 90 CAPSULE | Refills: 0 | Status: SHIPPED | OUTPATIENT
Start: 2019-06-24 | End: 2019-10-25

## 2019-07-12 ENCOUNTER — OFFICE VISIT (OUTPATIENT)
Dept: FAMILY MEDICINE CLINIC | Facility: CLINIC | Age: 64
End: 2019-07-12

## 2019-07-12 VITALS
DIASTOLIC BLOOD PRESSURE: 82 MMHG | BODY MASS INDEX: 35.58 KG/M2 | HEIGHT: 63 IN | SYSTOLIC BLOOD PRESSURE: 120 MMHG | WEIGHT: 200.8 LBS | HEART RATE: 82 BPM | OXYGEN SATURATION: 98 % | TEMPERATURE: 98.4 F

## 2019-07-12 DIAGNOSIS — E53.8 B12 DEFICIENCY: ICD-10-CM

## 2019-07-12 DIAGNOSIS — E55.9 VITAMIN D DEFICIENCY: ICD-10-CM

## 2019-07-12 DIAGNOSIS — E78.49 OTHER HYPERLIPIDEMIA: Primary | ICD-10-CM

## 2019-07-12 DIAGNOSIS — D75.89 MACROCYTOSIS: ICD-10-CM

## 2019-07-12 DIAGNOSIS — F41.9 ANXIETY: ICD-10-CM

## 2019-07-12 PROCEDURE — 99214 OFFICE O/P EST MOD 30 MIN: CPT | Performed by: PHYSICIAN ASSISTANT

## 2019-07-12 NOTE — PATIENT INSTRUCTIONS
63 YEAR OLD FEMALE WHO PRESENTS TODAY IN FOLLOW UP OF HYPERLIPIDEMIA, VITAMIN D,  MACROCYTOSIS, B12 DEFICIENCY, AND MOODS. PATIENT TO HAVE FASTING LABS TODAY- CALL IF NO RESULTS IN 1 WEEK. SHE REPORTS MOODS ARE OK AND GERD HAS BEEN OK. SHE HAD EGD IN 5/2018 WITHOUT STRICTURE OR NEED FOR DILATION (HISTORY OF STRICTURE IN THE PAST). SHE WAS NOTED TO HAVE MEDIUM, MODERATE HIATAL HERNIA AND MUCOSA WAS SUGGESTIVE OF EOSINOPHILIC ESOPHAGITIS WITH NEGATIVE BIOPSY FOR EOSINOPHILS. HE SUGGESTED REPEAT EGD IN 3-5 YEARS.      SHE HAD RIGHT MCP SURGERY 10/2018 AND LEFT MCP SURGERY 12/17/18. SHE IS HEALING WELL.     HER BACK IMPROVED FROM PREVIOUS VISIT IN 4/2019. SHE REPOTS MEDICATION AND SLEEPING ON A TENNIS BALL SEEMED TO HELP. WE WILL CONSIDER FURTHER WORKUP VS PT IF RECURRENCE OF SYMPTOMS.      OF NOTE, PATIENT NOTED CHANGE IN BM IN 8/2018 WITH INCREASED URGENCY AND FECAL INCONTINENCE. ALSO HAD STRESS FECAL INCONTINENCE. SHE HAD SIGNIFICANT HEMORRHOIDS ON EXAM AND POSSIBLE MILD RECTAL PROLAPSE AS WELL. SHE HAD NOTED DECREASED RECTAL TONE. PATIENT WANTED TO START WITH FOLLOW UP WITH DR UNDERWOOD. SHE HAS NOT YET FOLLOWED UP, HOWEVER, SHE REPORTS SYMPTOMS HAVE IMPROVED SOME. IF NO FINDINGS WITH HIM, SHE WILL NEED MRI LUMBOSACRAL REGION AND CONSIDER FURTHER NEUROLOGICAL WORKUP. WE COULD ALSO CONSIDER PELVIC FLOOR STRENGTHENING PT. PATIENT WILL CALL AFTER SEEING DR UNDERWOOD, AND I WILL AWAIT NOTE. PATIENT TO LET ME KNOW IF SHE IS UNABLE TO GET APPT SCHEDULED.

## 2019-07-12 NOTE — PROGRESS NOTES
Subjective   Virginia Leiva is a 63 y.o. female here today for medication management for hyperlipidemia, vitamin B 12 and D deficiency     History of Present Illness     Patient has been doing well without new complaints.  She is tolerating medications without AE.  She reports her back pain resolved after last visit.  Flexeril, Toradol, and sleeping on a tennis ball seem to help.  No recurrence of symptoms.  So far, moods and GERD have been okay.    The following portions of the patient's history were reviewed and updated as appropriate: allergies, current medications, past family history, past medical history, past social history, past surgical history and problem list.    Review of Systems   All other systems reviewed and are negative.      Objective   Physical Exam   Constitutional: She is oriented to person, place, and time. She appears well-developed and well-nourished.   HENT:   Head: Normocephalic and atraumatic.   Right Ear: External ear normal.   Left Ear: External ear normal.   Nose: Nose normal.   Eyes: Conjunctivae and lids are normal.   Neck: Neck supple. Carotid bruit is not present.   Cardiovascular: Normal rate, regular rhythm, normal heart sounds and intact distal pulses. Exam reveals no gallop and no friction rub.   No murmur heard.  Pulmonary/Chest: Effort normal and breath sounds normal. No respiratory distress. She has no wheezes. She has no rhonchi. She has no rales.   Musculoskeletal: She exhibits no edema or deformity.   Neurological: She is alert and oriented to person, place, and time. Gait normal.   Skin: Skin is warm and dry.   Psychiatric: She has a normal mood and affect. Her speech is normal and behavior is normal. Judgment and thought content normal. Cognition and memory are normal.   Nursing note and vitals reviewed.      Assessment/Plan   Virginia was seen today for med management.    Diagnoses and all orders for this visit:    Other hyperlipidemia  -     Comprehensive Metabolic  Panel  -     Lipid Panel With LDL / HDL Ratio  -     Thyroid Panel With TSH    Vitamin D deficiency  -     Comprehensive Metabolic Panel  -     Thyroid Panel With TSH  -     Vitamin D 25 Hydroxy    Macrocytosis  -     CBC & Differential  -     Thyroid Panel With TSH  -     Vitamin B12 & Folate    B12 deficiency  -     CBC & Differential  -     Thyroid Panel With TSH  -     Vitamin B12 & Folate    Anxiety  -     Thyroid Panel With TSH      Patient Instructions   63 YEAR OLD FEMALE WHO PRESENTS TODAY IN FOLLOW UP OF HYPERLIPIDEMIA, VITAMIN D,  MACROCYTOSIS, B12 DEFICIENCY, AND MOODS. PATIENT TO HAVE FASTING LABS TODAY- CALL IF NO RESULTS IN 1 WEEK. SHE REPORTS MOODS ARE OK AND GERD HAS BEEN OK. SHE HAD EGD IN 5/2018 WITHOUT STRICTURE OR NEED FOR DILATION (HISTORY OF STRICTURE IN THE PAST). SHE WAS NOTED TO HAVE MEDIUM, MODERATE HIATAL HERNIA AND MUCOSA WAS SUGGESTIVE OF EOSINOPHILIC ESOPHAGITIS WITH NEGATIVE BIOPSY FOR EOSINOPHILS. HE SUGGESTED REPEAT EGD IN 3-5 YEARS.      SHE HAD RIGHT MCP SURGERY 10/2018 AND LEFT MCP SURGERY 12/17/18. SHE IS HEALING WELL.     HER BACK IMPROVED FROM PREVIOUS VISIT IN 4/2019. SHE REPOTS MEDICATION AND SLEEPING ON A TENNIS BALL SEEMED TO HELP. WE WILL CONSIDER FURTHER WORKUP VS PT IF RECURRENCE OF SYMPTOMS.      OF NOTE, PATIENT NOTED CHANGE IN BM IN 8/2018 WITH INCREASED URGENCY AND FECAL INCONTINENCE. ALSO HAD STRESS FECAL INCONTINENCE. SHE HAD SIGNIFICANT HEMORRHOIDS ON EXAM AND POSSIBLE MILD RECTAL PROLAPSE AS WELL. SHE HAD NOTED DECREASED RECTAL TONE. PATIENT WANTED TO START WITH FOLLOW UP WITH DR UNDERWOOD. SHE HAS NOT YET FOLLOWED UP, HOWEVER, SHE REPORTS SYMPTOMS HAVE IMPROVED SOME. IF NO FINDINGS WITH HIM, SHE WILL NEED MRI LUMBOSACRAL REGION AND CONSIDER FURTHER NEUROLOGICAL WORKUP. WE COULD ALSO CONSIDER PELVIC FLOOR STRENGTHENING PT. PATIENT WILL CALL AFTER SEEING DR UNDERWOOD, AND I WILL AWAIT NOTE. PATIENT TO LET ME KNOW IF SHE IS UNABLE TO GET APPT SCHEDULED.

## 2019-07-13 LAB
25(OH)D3+25(OH)D2 SERPL-MCNC: 33.2 NG/ML (ref 30–100)
ALBUMIN SERPL-MCNC: 4.7 G/DL (ref 3.5–5.2)
ALBUMIN/GLOB SERPL: 2.2 G/DL
ALP SERPL-CCNC: 80 U/L (ref 39–117)
ALT SERPL-CCNC: 27 U/L (ref 1–33)
AST SERPL-CCNC: 23 U/L (ref 1–32)
BASOPHILS # BLD AUTO: 0.05 10*3/MM3 (ref 0–0.2)
BASOPHILS NFR BLD AUTO: 0.7 % (ref 0–1.5)
BILIRUB SERPL-MCNC: 0.7 MG/DL (ref 0.2–1.2)
BUN SERPL-MCNC: 16 MG/DL (ref 8–23)
BUN/CREAT SERPL: 18.8 (ref 7–25)
CALCIUM SERPL-MCNC: 9.5 MG/DL (ref 8.6–10.5)
CHLORIDE SERPL-SCNC: 102 MMOL/L (ref 98–107)
CHOLEST SERPL-MCNC: 219 MG/DL (ref 0–200)
CO2 SERPL-SCNC: 26.2 MMOL/L (ref 22–29)
CREAT SERPL-MCNC: 0.85 MG/DL (ref 0.57–1)
EOSINOPHIL # BLD AUTO: 0.18 10*3/MM3 (ref 0–0.4)
EOSINOPHIL NFR BLD AUTO: 2.3 % (ref 0.3–6.2)
ERYTHROCYTE [DISTWIDTH] IN BLOOD BY AUTOMATED COUNT: 14.5 % (ref 12.3–15.4)
FOLATE SERPL-MCNC: 14.5 NG/ML (ref 4.78–24.2)
FT4I SERPL CALC-MCNC: 1.9 (ref 1.2–4.9)
GLOBULIN SER CALC-MCNC: 2.1 GM/DL
GLUCOSE SERPL-MCNC: 96 MG/DL (ref 65–99)
HCT VFR BLD AUTO: 46.7 % (ref 34–46.6)
HDLC SERPL-MCNC: 49 MG/DL (ref 40–60)
HGB BLD-MCNC: 14 G/DL (ref 12–15.9)
IMM GRANULOCYTES # BLD AUTO: 0.03 10*3/MM3 (ref 0–0.05)
IMM GRANULOCYTES NFR BLD AUTO: 0.4 % (ref 0–0.5)
LDLC SERPL CALC-MCNC: 142 MG/DL (ref 0–100)
LDLC/HDLC SERPL: 2.9 {RATIO}
LYMPHOCYTES # BLD AUTO: 2.43 10*3/MM3 (ref 0.7–3.1)
LYMPHOCYTES NFR BLD AUTO: 31.7 % (ref 19.6–45.3)
MCH RBC QN AUTO: 29 PG (ref 26.6–33)
MCHC RBC AUTO-ENTMCNC: 30 G/DL (ref 31.5–35.7)
MCV RBC AUTO: 96.9 FL (ref 79–97)
MONOCYTES # BLD AUTO: 0.57 10*3/MM3 (ref 0.1–0.9)
MONOCYTES NFR BLD AUTO: 7.4 % (ref 5–12)
NEUTROPHILS # BLD AUTO: 4.41 10*3/MM3 (ref 1.7–7)
NEUTROPHILS NFR BLD AUTO: 57.5 % (ref 42.7–76)
NRBC BLD AUTO-RTO: 0 /100 WBC (ref 0–0.2)
PLATELET # BLD AUTO: 205 10*3/MM3 (ref 140–450)
POTASSIUM SERPL-SCNC: 4.6 MMOL/L (ref 3.5–5.2)
PROT SERPL-MCNC: 6.8 G/DL (ref 6–8.5)
RBC # BLD AUTO: 4.82 10*6/MM3 (ref 3.77–5.28)
SODIUM SERPL-SCNC: 141 MMOL/L (ref 136–145)
T3RU NFR SERPL: 24 % (ref 24–39)
T4 SERPL-MCNC: 8 UG/DL (ref 4.5–12)
TRIGL SERPL-MCNC: 139 MG/DL (ref 0–150)
TSH SERPL DL<=0.005 MIU/L-ACNC: 1.11 UIU/ML (ref 0.45–4.5)
VIT B12 SERPL-MCNC: 474 PG/ML (ref 211–946)
VLDLC SERPL CALC-MCNC: 27.8 MG/DL
WBC # BLD AUTO: 7.67 10*3/MM3 (ref 3.4–10.8)

## 2019-09-06 DIAGNOSIS — K22.2 ESOPHAGEAL STRICTURE: ICD-10-CM

## 2019-09-06 DIAGNOSIS — K21.9 GASTROESOPHAGEAL REFLUX DISEASE, ESOPHAGITIS PRESENCE NOT SPECIFIED: ICD-10-CM

## 2019-09-06 RX ORDER — OMEPRAZOLE 20 MG/1
CAPSULE, DELAYED RELEASE ORAL
Qty: 90 CAPSULE | Refills: 0 | Status: SHIPPED | OUTPATIENT
Start: 2019-09-06 | End: 2019-12-06 | Stop reason: SDUPTHER

## 2019-09-13 DIAGNOSIS — K21.9 GASTROESOPHAGEAL REFLUX DISEASE, ESOPHAGITIS PRESENCE NOT SPECIFIED: ICD-10-CM

## 2019-09-13 DIAGNOSIS — K22.2 ESOPHAGEAL STRICTURE: ICD-10-CM

## 2019-09-13 RX ORDER — FLUOXETINE HYDROCHLORIDE 20 MG/1
20 CAPSULE ORAL DAILY
Qty: 90 CAPSULE | Refills: 0 | Status: SHIPPED | OUTPATIENT
Start: 2019-09-13 | End: 2019-12-02 | Stop reason: SDUPTHER

## 2019-10-25 ENCOUNTER — OFFICE VISIT (OUTPATIENT)
Dept: FAMILY MEDICINE CLINIC | Facility: CLINIC | Age: 64
End: 2019-10-25

## 2019-10-25 VITALS
BODY MASS INDEX: 36.32 KG/M2 | HEART RATE: 82 BPM | TEMPERATURE: 98.2 F | OXYGEN SATURATION: 98 % | HEIGHT: 63 IN | SYSTOLIC BLOOD PRESSURE: 130 MMHG | DIASTOLIC BLOOD PRESSURE: 80 MMHG | WEIGHT: 205 LBS

## 2019-10-25 DIAGNOSIS — M25.511 ACUTE PAIN OF RIGHT SHOULDER: Primary | ICD-10-CM

## 2019-10-25 PROCEDURE — 99213 OFFICE O/P EST LOW 20 MIN: CPT | Performed by: PHYSICIAN ASSISTANT

## 2019-10-25 NOTE — PROGRESS NOTES
Subjective   Virginia Leiva is a 64 y.o. female presented today with right shoulder pain started on Monday    History of Present Illness     Patient with pain in right lateral shoulder that started about 4 days ago. She has not had an injury but started with pain. No pain, numbness or tingling in her arm or hand.     The following portions of the patient's history were reviewed and updated as appropriate: allergies, current medications, past family history, past medical history, past social history, past surgical history and problem list.    Review of Systems   Musculoskeletal:        Right shoulder pain    All other systems reviewed and are negative.      Objective   Physical Exam   Constitutional: She is oriented to person, place, and time. She appears well-developed and well-nourished.   HENT:   Head: Normocephalic and atraumatic.   Right Ear: External ear normal.   Left Ear: External ear normal.   Nose: Nose normal.   Eyes: Conjunctivae and lids are normal.   Neck: Neck supple. Carotid bruit is not present.   Cardiovascular: Normal rate, regular rhythm, normal heart sounds and intact distal pulses. Exam reveals no gallop and no friction rub.   No murmur heard.  Pulmonary/Chest: Effort normal and breath sounds normal. No respiratory distress. She has no wheezes. She has no rhonchi. She has no rales.   Musculoskeletal: She exhibits no edema or deformity.        Right shoulder: She exhibits tenderness, pain and decreased strength. She exhibits normal range of motion, no swelling, no effusion, no crepitus, no deformity, no spasm and normal pulse.        Cervical back: She exhibits normal range of motion, no tenderness, no bony tenderness, no swelling, no edema, no pain, no spasm and normal pulse.   Neurological: She is alert and oriented to person, place, and time. She has normal strength. No sensory deficit. Gait normal.   Reflex Scores:       Bicep reflexes are 2+ on the right side and 2+ on the left side.        Brachioradialis reflexes are 2+ on the right side and 2+ on the left side.  Skin: Skin is warm and dry.   Psychiatric: She has a normal mood and affect. Her speech is normal and behavior is normal. Judgment and thought content normal. Cognition and memory are normal.   Nursing note and vitals reviewed.      Assessment/Plan   Virginia was seen today for shoulder pain.    Diagnoses and all orders for this visit:    Acute pain of right shoulder  -     Ambulatory Referral to Physical Therapy      Patient Instructions   64 year old female who presents today with pain in right lateral shoulder that started about 4 days ago. She has not had an injury but started with pain. No pain, numbness or tingling in her arm or hand. I will refer to physical therapy for evaluation and treatment. If no improvement or worsening, I will consider imaging vs orthopedic surgery referral for evaluation, consideration of MRI vs MR arthrogram, and treatment.

## 2019-10-31 NOTE — PATIENT INSTRUCTIONS
64 year old female who presents today with pain in right lateral shoulder that started about 4 days ago. She has not had an injury but started with pain. No pain, numbness or tingling in her arm or hand. I will refer to physical therapy for evaluation and treatment. If no improvement or worsening, I will consider imaging vs orthopedic surgery referral for evaluation, consideration of MRI vs MR arthrogram, and treatment.

## 2019-11-04 RX ORDER — NITROFURANTOIN 25; 75 MG/1; MG/1
CAPSULE ORAL
Qty: 14 CAPSULE | OUTPATIENT
Start: 2019-11-04

## 2019-11-12 DIAGNOSIS — K21.9 GASTROESOPHAGEAL REFLUX DISEASE, ESOPHAGITIS PRESENCE NOT SPECIFIED: ICD-10-CM

## 2019-11-12 DIAGNOSIS — K22.2 ESOPHAGEAL STRICTURE: ICD-10-CM

## 2019-11-13 RX ORDER — FLUOXETINE HYDROCHLORIDE 20 MG/1
CAPSULE ORAL
Qty: 90 CAPSULE | Refills: 0 | OUTPATIENT
Start: 2019-11-13

## 2019-12-02 DIAGNOSIS — K22.2 ESOPHAGEAL STRICTURE: ICD-10-CM

## 2019-12-02 DIAGNOSIS — K21.9 GASTROESOPHAGEAL REFLUX DISEASE, ESOPHAGITIS PRESENCE NOT SPECIFIED: ICD-10-CM

## 2019-12-03 RX ORDER — FLUOXETINE HYDROCHLORIDE 20 MG/1
CAPSULE ORAL
Qty: 90 CAPSULE | Refills: 0 | Status: SHIPPED | OUTPATIENT
Start: 2019-12-03 | End: 2020-03-09

## 2019-12-06 DIAGNOSIS — K21.9 GASTROESOPHAGEAL REFLUX DISEASE, ESOPHAGITIS PRESENCE NOT SPECIFIED: ICD-10-CM

## 2019-12-06 DIAGNOSIS — K22.2 ESOPHAGEAL STRICTURE: ICD-10-CM

## 2019-12-06 RX ORDER — OMEPRAZOLE 20 MG/1
CAPSULE, DELAYED RELEASE ORAL
Qty: 90 CAPSULE | Refills: 0 | Status: SHIPPED | OUTPATIENT
Start: 2019-12-06 | End: 2020-03-05

## 2020-03-05 DIAGNOSIS — K22.2 ESOPHAGEAL STRICTURE: ICD-10-CM

## 2020-03-05 DIAGNOSIS — K21.9 GASTROESOPHAGEAL REFLUX DISEASE, ESOPHAGITIS PRESENCE NOT SPECIFIED: ICD-10-CM

## 2020-03-05 RX ORDER — OMEPRAZOLE 20 MG/1
CAPSULE, DELAYED RELEASE ORAL
Qty: 90 CAPSULE | Refills: 0 | Status: SHIPPED | OUTPATIENT
Start: 2020-03-05 | End: 2020-05-22 | Stop reason: SDUPTHER

## 2020-03-07 DIAGNOSIS — K21.9 GASTROESOPHAGEAL REFLUX DISEASE, ESOPHAGITIS PRESENCE NOT SPECIFIED: ICD-10-CM

## 2020-03-07 DIAGNOSIS — K22.2 ESOPHAGEAL STRICTURE: ICD-10-CM

## 2020-03-09 RX ORDER — FLUOXETINE HYDROCHLORIDE 20 MG/1
CAPSULE ORAL
Qty: 90 CAPSULE | Refills: 0 | Status: SHIPPED | OUTPATIENT
Start: 2020-03-09 | End: 2020-05-20

## 2020-04-20 ENCOUNTER — TELEPHONE (OUTPATIENT)
Dept: FAMILY MEDICINE CLINIC | Facility: CLINIC | Age: 65
End: 2020-04-20

## 2020-04-20 NOTE — TELEPHONE ENCOUNTER
The last visit we have for evaluating her moods was 7/2019 and moods were controlled. She would likely need a visit. I cannot guarantee I can give her a letter for an emotional support animal, but I could get more information and re-evaluate her moods.

## 2020-04-20 NOTE — TELEPHONE ENCOUNTER
----- Message from Tram Alcantara MA sent at 4/20/2020 11:51 AM EDT -----  Regarding: FW: Non-Urgent Medical Question  Contact: [phone]      ----- Message -----  From: Virginia Leiva  Sent: 4/20/2020  11:50 AM EDT  To: [facility]  Subject: Non-Urgent Medical Question                      What are the requirements to get a letter from you for an empty support dog?

## 2020-05-19 DIAGNOSIS — K21.9 GASTROESOPHAGEAL REFLUX DISEASE, ESOPHAGITIS PRESENCE NOT SPECIFIED: ICD-10-CM

## 2020-05-19 DIAGNOSIS — K22.2 ESOPHAGEAL STRICTURE: ICD-10-CM

## 2020-05-20 RX ORDER — FLUOXETINE HYDROCHLORIDE 20 MG/1
CAPSULE ORAL
Qty: 90 CAPSULE | Refills: 0 | Status: SHIPPED | OUTPATIENT
Start: 2020-05-20 | End: 2020-05-22 | Stop reason: SDUPTHER

## 2020-05-21 NOTE — PROGRESS NOTES
Subjective   Virginia Leiva is a 64 y.o. female who is being evaluated by video visit today in follow-up of hyperlipidemia, vitamin D and B12 deficiencies, macrocytosis, GERD, moods, arthritis, and back pain.    History of Present Illness   You have chosen to receive care through a telehealth visit.  Do you consent to use a video/audio connection for your medical care today? Yes    Hyperlipidemia-patient is controlling with diet.  On no cholesterol medications.  Vitamin D-taking 1000 IU once daily.  [id number] not find 100 mcg tablet so she is taking B12 1000 mcg twice weekly.    GERD-has been stable on Prilosec 20 mg once daily without breakthrough symptoms. She had an EGD 5/2018 without stricture or need for dilation (history of stricture in the past).  She was noted to have medium/moderate hiatal hernia and mucosa was suggestive of eosinophilic esophagitis with negative biopsies for eosinophils.  He recommended repeat EGD in 3 to 5 years.    Moods-well controlled on Prozac 20 mg once daily.  Ulcerating without AE.    Arthritis and back pain-No worsening, changing, new or different. More left knee pain, intermittent feels like it going to give out. Seen by orthopedist and was told arthritis.  Takes Tylenol as needed.  Intermittent back pain and uses Flexeril only occasionally.    The following portions of the patient's history were reviewed and updated as appropriate: allergies, current medications, past family history, past medical history, past social history, past surgical history and problem list.    Review of Systems   Constitutional: Negative.    HENT: Negative.    Respiratory: Negative.    Cardiovascular: Negative.    Gastrointestinal: Negative.    Genitourinary: Negative.    Musculoskeletal: Positive for arthralgias and back pain.   Neurological: Negative.    Psychiatric/Behavioral: Negative.        Objective      Physical Exam   Constitutional: She appears well-developed and well-nourished. No distress.    HENT:   Head: Normocephalic and atraumatic.   Eyes: Right eye exhibits no discharge. Left eye exhibits no discharge.   Pulmonary/Chest: Effort normal. No respiratory distress.   Skin: Skin is dry.   Psychiatric: Her speech is normal and behavior is normal. Cognition and memory are normal. She is attentive.       Assessment/Plan   Diagnoses and all orders for this visit:    Other hyperlipidemia  -     Comprehensive Metabolic Panel  -     CK  -     Lipid Panel With LDL / HDL Ratio  -     Urinalysis With Microscopic - Urine, Clean Catch    Vitamin D deficiency  -     Comprehensive Metabolic Panel  -     Vitamin D 25 Hydroxy  -     Urinalysis With Microscopic - Urine, Clean Catch    B12 deficiency  -     CBC & Differential  -     Vitamin B12 & Folate  -     Urinalysis With Microscopic - Urine, Clean Catch    Macrocytosis  -     CBC & Differential  -     Vitamin B12 & Folate  -     Urinalysis With Microscopic - Urine, Clean Catch  -     TSH    Anxiety  -     Urinalysis With Microscopic - Urine, Clean Catch  -     TSH    Gastroesophageal reflux disease, esophagitis presence not specified  -     Comprehensive Metabolic Panel  -     Urinalysis With Microscopic - Urine, Clean Catch  -     FLUoxetine (PROzac) 20 MG capsule; Take 1 capsule by mouth Daily.  -     omeprazole (priLOSEC) 20 MG capsule; Take 1 capsule by mouth Daily.    Esophageal stricture  -     Comprehensive Metabolic Panel  -     FLUoxetine (PROzac) 20 MG capsule; Take 1 capsule by mouth Daily.  -     omeprazole (priLOSEC) 20 MG capsule; Take 1 capsule by mouth Daily.    Bilateral chronic knee pain      Assessment and plan  64 y.o. female who is being evaluated by video visit today in follow-up of hyperlipidemia, vitamin D and B12 deficiencies, macrocytosis, GERD, moods, arthritis, and back pain.  Patient with hyperlipidemia in the past but controls with diet and is on no medications.  She continues on vitamin D 1000 IU daily and B12 1000 mcg twice weekly.  Patient will have fasting labs. Call if no results in 1 week. Stability of conditions, plan, follow up, and further recommendations pending labs.  If stable, follow-up in 6 months.    Moods have been stable on Prozac 20 mg once daily.  She is tolerating without AE and feels she is on the correct dose.  GERD has been controlled with Prilosec 20 mg once daily without breakthrough symptoms.  She had an EGD 5/2018 without stricture or need for dilation (history of stricture in the past).  She was noted to have medium/moderate hiatal hernia and mucosa was suggestive of eosinophilic esophagitis with negative biopsies for eosinophils.  He recommended repeat EGD in 3 to 5 years.    Patient with history of arthritis.  She had right MCP surgery 10/2018 and left MCP surgery 12/2018.  She healed well.  She also had some significant back pain at one point but reports this has been stable.  She has not had any increased symptoms.  Patient uses Flexeril only occasionally with back pain.  She also has arthritis of her knees and has seen orthopedic surgeon in the past.  She uses Tylenol as needed.    She noted change in BM 8/2018 with increased fecal urgency and incontinence.  She also had stress fecal incontinence.  She had significant hemorrhoids on exam and possible mild rectal prolapse as well.  She had decreased rectal tone as well.  Patient wanted to start with follow-up with Dr. Covarrubias but did not schedule follow-up with him.  She reports symptoms improved.  I discussed with her follow-up with GI and the need for MRI lumbosacral region and consideration of further neurological work-up as well as consideration of pelvic floor strengthening PT.  She did not want to proceed with further work-up at that time and has had no recurrence of the symptoms.    About 25 minutes spent reviewing the patient's chart and video visit, medical decision-making, and treatment plan.

## 2020-05-22 ENCOUNTER — TELEMEDICINE (OUTPATIENT)
Dept: FAMILY MEDICINE CLINIC | Facility: CLINIC | Age: 65
End: 2020-05-22

## 2020-05-22 DIAGNOSIS — F41.9 ANXIETY: ICD-10-CM

## 2020-05-22 DIAGNOSIS — E53.8 B12 DEFICIENCY: ICD-10-CM

## 2020-05-22 DIAGNOSIS — K21.9 GASTROESOPHAGEAL REFLUX DISEASE, ESOPHAGITIS PRESENCE NOT SPECIFIED: ICD-10-CM

## 2020-05-22 DIAGNOSIS — M25.561 BILATERAL CHRONIC KNEE PAIN: ICD-10-CM

## 2020-05-22 DIAGNOSIS — E78.49 OTHER HYPERLIPIDEMIA: Primary | ICD-10-CM

## 2020-05-22 DIAGNOSIS — E55.9 VITAMIN D DEFICIENCY: ICD-10-CM

## 2020-05-22 DIAGNOSIS — M25.562 BILATERAL CHRONIC KNEE PAIN: ICD-10-CM

## 2020-05-22 DIAGNOSIS — G89.29 BILATERAL CHRONIC KNEE PAIN: ICD-10-CM

## 2020-05-22 DIAGNOSIS — D75.89 MACROCYTOSIS: ICD-10-CM

## 2020-05-22 DIAGNOSIS — K22.2 ESOPHAGEAL STRICTURE: ICD-10-CM

## 2020-05-22 PROCEDURE — 99214 OFFICE O/P EST MOD 30 MIN: CPT | Performed by: PHYSICIAN ASSISTANT

## 2020-05-22 RX ORDER — LANOLIN ALCOHOL/MO/W.PET/CERES
1000 CREAM (GRAM) TOPICAL 2 TIMES WEEKLY
COMMUNITY

## 2020-05-22 RX ORDER — OMEPRAZOLE 20 MG/1
20 CAPSULE, DELAYED RELEASE ORAL DAILY
Qty: 90 CAPSULE | Refills: 1 | Status: SHIPPED | OUTPATIENT
Start: 2020-05-22 | End: 2020-06-03

## 2020-05-22 RX ORDER — FLUOXETINE HYDROCHLORIDE 20 MG/1
20 CAPSULE ORAL DAILY
Qty: 90 CAPSULE | Refills: 1 | Status: SHIPPED | OUTPATIENT
Start: 2020-05-22 | End: 2020-06-05

## 2020-05-22 NOTE — PATIENT INSTRUCTIONS
Patient will have fasting labs. Call if no results in 1 week. Stability of conditions, plan, follow up, and further recommendations pending labs.  If stable, follow-up in 6 months.

## 2020-06-03 DIAGNOSIS — K21.9 GASTROESOPHAGEAL REFLUX DISEASE, ESOPHAGITIS PRESENCE NOT SPECIFIED: ICD-10-CM

## 2020-06-03 DIAGNOSIS — K22.2 ESOPHAGEAL STRICTURE: ICD-10-CM

## 2020-06-03 RX ORDER — OMEPRAZOLE 20 MG/1
CAPSULE, DELAYED RELEASE ORAL
Qty: 90 CAPSULE | Refills: 0 | Status: SHIPPED | OUTPATIENT
Start: 2020-06-03 | End: 2020-08-07 | Stop reason: SDUPTHER

## 2020-06-05 DIAGNOSIS — K21.9 GASTROESOPHAGEAL REFLUX DISEASE, ESOPHAGITIS PRESENCE NOT SPECIFIED: ICD-10-CM

## 2020-06-05 DIAGNOSIS — K22.2 ESOPHAGEAL STRICTURE: ICD-10-CM

## 2020-06-05 RX ORDER — FLUOXETINE HYDROCHLORIDE 20 MG/1
CAPSULE ORAL
Qty: 90 CAPSULE | Refills: 0 | Status: SHIPPED | OUTPATIENT
Start: 2020-06-05 | End: 2020-11-23

## 2020-06-30 ENCOUNTER — OFFICE VISIT (OUTPATIENT)
Dept: FAMILY MEDICINE CLINIC | Facility: CLINIC | Age: 65
End: 2020-06-30

## 2020-06-30 VITALS
HEART RATE: 80 BPM | BODY MASS INDEX: 36.5 KG/M2 | HEIGHT: 63 IN | SYSTOLIC BLOOD PRESSURE: 120 MMHG | TEMPERATURE: 97.3 F | WEIGHT: 206 LBS | DIASTOLIC BLOOD PRESSURE: 76 MMHG | OXYGEN SATURATION: 96 %

## 2020-06-30 DIAGNOSIS — D75.89 MACROCYTOSIS: ICD-10-CM

## 2020-06-30 DIAGNOSIS — E78.49 OTHER HYPERLIPIDEMIA: ICD-10-CM

## 2020-06-30 DIAGNOSIS — E53.8 B12 DEFICIENCY: ICD-10-CM

## 2020-06-30 DIAGNOSIS — M25.561 BILATERAL CHRONIC KNEE PAIN: ICD-10-CM

## 2020-06-30 DIAGNOSIS — M25.511 ACUTE PAIN OF RIGHT SHOULDER: ICD-10-CM

## 2020-06-30 DIAGNOSIS — M25.562 BILATERAL CHRONIC KNEE PAIN: ICD-10-CM

## 2020-06-30 DIAGNOSIS — K21.9 GASTROESOPHAGEAL REFLUX DISEASE, ESOPHAGITIS PRESENCE NOT SPECIFIED: ICD-10-CM

## 2020-06-30 DIAGNOSIS — G89.29 BILATERAL CHRONIC KNEE PAIN: ICD-10-CM

## 2020-06-30 DIAGNOSIS — E55.9 VITAMIN D DEFICIENCY: ICD-10-CM

## 2020-06-30 DIAGNOSIS — M79.671 RIGHT FOOT PAIN: ICD-10-CM

## 2020-06-30 DIAGNOSIS — M54.40 ACUTE RIGHT-SIDED LOW BACK PAIN WITH SCIATICA, SCIATICA LATERALITY UNSPECIFIED: ICD-10-CM

## 2020-06-30 DIAGNOSIS — H93.19 TINNITUS, UNSPECIFIED LATERALITY: ICD-10-CM

## 2020-06-30 DIAGNOSIS — K22.2 ESOPHAGEAL STRICTURE: ICD-10-CM

## 2020-06-30 DIAGNOSIS — R42 DIZZINESS: Primary | ICD-10-CM

## 2020-06-30 DIAGNOSIS — F41.9 ANXIETY: ICD-10-CM

## 2020-06-30 PROCEDURE — 99214 OFFICE O/P EST MOD 30 MIN: CPT | Performed by: PHYSICIAN ASSISTANT

## 2020-06-30 RX ORDER — FLUTICASONE PROPIONATE 50 MCG
2 SPRAY, SUSPENSION (ML) NASAL DAILY
Qty: 1 BOTTLE | Refills: 0 | Status: SHIPPED | OUTPATIENT
Start: 2020-06-30 | End: 2020-07-30

## 2020-06-30 NOTE — PROGRESS NOTES
Subjective   Virginia Leiva is a 64 y.o. female who presents today for dizziness and labs in follow-up of hyperlipidemia, vitamin D and B12 deficiencies, macrocytosis, GERD, moods, arthritis, and back pain.    History of Present Illness   Answers for HPI/ROS submitted by the patient on 6/24/2020   What is the primary reason for your visit?: Other  Please describe your symptoms.: Dizziness  Have you had these symptoms before?: Yes  How long have you been having these symptoms?: Greater than 2 weeks  Please list any medications you are currently taking for this condition.: None  Please describe any probable cause for these symptoms. : Possible inner ear issue    Dizziness started 2/2020- was drinking crystal light and stopped and had improvement in GI symptoms. When she gets up, feels like her eyes are twitching but they are not. Sometimes feels some spinning. When driving down the road, makes nervous to focus and see. No confusion, trouble speaking, trouble using arms/ legs. Ringing in her ears for years. Normal hearing. Sometimes dizziness is worse with turning her head. If she lays on 1 side too long, has a headache- worse on the left. No blurred vision. Last opt/ophthalmology was > 1 year ago. Laying down, has not symptoms. Happens every day since February.     Numbness in hands intermittent.     Right foot- has plantar fasciitis bilaterally, diagnosed by her previous PCP in [place] who told her to get inserts. Bought insoles but still has pain. Pain is worse if walking longer distances. When she gets up in the morning, no pain. Sometimes laying in bed, left leg gets slightly tingly. Sitting now can feel left leg tingling.      Hyperlipidemia-patient is controlling with diet.  On no cholesterol medications.  Vitamin D-taking 1000 IU once daily.  B12-taking B12 1000 mcg twice weekly.    GERD-has been stable on Prilosec 20 mg once daily without breakthrough symptoms. She had an EGD 5/2018 without stricture or need for  dilation (history of stricture in the past).  She was noted to have medium/moderate hiatal hernia and mucosa was suggestive of eosinophilic esophagitis with negative biopsies for eosinophils.  He recommended repeat EGD in 3 to 5 years.    Moods-well controlled on Prozac 20 mg once daily.  Tolerating without AE.    Arthritis and back pain-No worsening, changing, new or different. More left knee pain, intermittent feels like it going to give out. Seen by orthopedist and was told arthritis.  Takes Tylenol as needed.  Intermittent back pain and uses Flexeril only occasionally.    The following portions of the patient's history were reviewed and updated as appropriate: allergies, current medications, past family history, past medical history, past social history, past surgical history and problem list.    Review of Systems   Constitutional: Negative.    HENT: Negative.    Respiratory: Negative.    Cardiovascular: Negative.    Gastrointestinal: Negative.    Genitourinary: Negative.    Musculoskeletal: Positive for arthralgias and back pain.   Neurological: Positive for dizziness and numbness.   Psychiatric/Behavioral: Negative.        Objective    Vitals:    06/30/20 0809   BP: 120/76   Pulse: 80   Temp: 97.3 °F (36.3 °C)   SpO2: 96%     Body mass index is 36.5 kg/m².    Physical Exam   Constitutional: She is oriented to person, place, and time. Vital signs are normal. She appears well-developed and well-nourished.   HENT:   Head: Normocephalic and atraumatic.   Right Ear: Tympanic membrane, external ear and ear canal normal.   Left Ear: Tympanic membrane, external ear and ear canal normal.   Nose: Nose normal.   Mouth/Throat: Uvula is midline, oropharynx is clear and moist and mucous membranes are normal.   Eyes: Pupils are equal, round, and reactive to light. Conjunctivae, EOM and lids are normal.   Neck: Neck supple. Carotid bruit is not present.   Cardiovascular: Normal rate, regular rhythm, normal heart sounds and  intact distal pulses. Exam reveals no gallop and no friction rub.   No murmur heard.  Pulmonary/Chest: Effort normal and breath sounds normal. No respiratory distress. She has no wheezes. She has no rhonchi. She has no rales.   Musculoskeletal: She exhibits no edema or deformity.   Neurological: She is alert and oriented to person, place, and time. She has normal strength and normal reflexes. She is not disoriented. No cranial nerve deficit or sensory deficit. Gait normal.   Skin: Skin is warm and dry.   Psychiatric: She has a normal mood and affect. Her speech is normal and behavior is normal. Judgment and thought content normal. She is not actively hallucinating. Cognition and memory are normal. She is attentive.   Nursing note and vitals reviewed.      Assessment/Plan   Virginia was seen today for dizziness and right foot pain.    Diagnoses and all orders for this visit:    Dizziness  -     MRI internal auditory canal w wo; Future  -     Duplex Carotid Ultrasound CAR; Future  -     fluticasone (Flonase) 50 MCG/ACT nasal spray; 2 sprays into the nostril(s) as directed by provider Daily for 30 days. Administer 2 sprays in each nostril for each dose.    Tinnitus, unspecified laterality  -     MRI internal auditory canal w wo; Future  -     Duplex Carotid Ultrasound CAR; Future  -     fluticasone (Flonase) 50 MCG/ACT nasal spray; 2 sprays into the nostril(s) as directed by provider Daily for 30 days. Administer 2 sprays in each nostril for each dose.    Right foot pain  -     XR Foot 3+ View Right; Future    Other hyperlipidemia    B12 deficiency    Vitamin D deficiency    Macrocytosis    Anxiety    Bilateral chronic knee pain    Acute pain of right shoulder    Acute right-sided low back pain with sciatica, sciatica laterality unspecified    Gastroesophageal reflux disease, esophagitis presence not specified    Esophageal stricture    Assessment and plan  64 y.o. female who presents today for dizziness and labs in  follow-up of hyperlipidemia, vitamin D and B12 deficiencies, macrocytosis, GERD, moods, arthritis, and back pain.  Dizziness started 2/2020. When she gets up, she feels like her eyes are twitching but they are not. She sometimes feels some spinning but not often. When she is driving down the road, she gets nervous to focus and see, as she can feel symptoms with driving. Dizziness is sometimes worse with turning her head but not consistently. No dizziness with laying down. She has had this every day since February. She has had ringing in her ears for years but reports normal hearing and no ear pain. If she lays on 1 side too long, she has a headache- worse on the left. She denies confusion, trouble speaking, trouble using arms/ legs, and blurred vision. Last opt/ophthalmology was > 1 year ago. She has had numbness in hands intermittent but no other numbness or neurological symptoms. Normal exam today. I advised we should check MRI brain and IAC with atypical symptoms. This is not most consistent with BPPV. We will need to rule out intracranial or IAC mass, intracranial abnormality, and MS. I will also refer for carotid duplex. She should go for annual eye exam and follow up of eye symptoms. If negative, I will refer to ENT for evaluation or symptoms and consideration of further workup vs neurology if needed.  To ER ASAP of worsening, new or changing symptoms.     She has had right foot. Patient reports she was diagnosed with plantar fasciitis bilaterally by her previous PCP in [place] who told her to get inserts. She bought insoles but continues with pain in the right lateral foot. Pain is worse with walking longer distances but no pain with her 1st steps of the day or after rest. No heel pain or other foot pain. Sometimes laying in bed or sitting, her left leg gets slightly tingly.     Patient with hyperlipidemia in the past but controls with diet and is on no medications.  She continues on vitamin D 1000 IU daily and  B12 1000 mcg twice weekly. Patient will have fasting labs today. Call if no results in 1 week. Stability of conditions, plan, follow up, and further recommendations pending labs.  If stable, follow-up in 6 months.    Moods have been stable on Prozac 20 mg once daily.  She is tolerating without AE and feels she is on the correct dose.  GERD has been controlled with Prilosec 20 mg once daily without breakthrough symptoms.  She had an EGD 5/2018 without stricture or need for dilation (history of stricture in the past), was noted to have medium/moderate hiatal hernia and mucosa was suggestive of eosinophilic esophagitis with negative biopsies for eosinophils.  GI recommended repeat EGD in 3 to 5 years.    Patient with history of arthritis.  She had right MCP surgery 10/2018 and left MCP surgery 12/2018 and healed well.  She also had some significant back pain at one point but reported this is stable. Patient uses Flexeril only occasionally with back pain.  She also has arthritis of her knees and has seen orthopedic surgeon in the past.  She uses Tylenol as needed.    She noted change in BM 8/2018 with increased fecal urgency and incontinence.  She also had stress fecal incontinence.  She had significant hemorrhoids on exam and possible mild rectal prolapse as well.  She had decreased rectal tone as well.  Patient wanted to start with follow-up with Dr. Covarrubias but did not schedule follow-up with him.  She reports symptoms improved.  I discussed with her follow-up with GI and the need for MRI lumbosacral region and consideration of further neurological work-up as well as consideration of pelvic floor strengthening PT.  She did not want to proceed with further work-up at that time and has had no recurrence of the symptoms.She reports she was drinking crystal light. She stopped Crystal Light and had improvement in GI symptoms she previously noted.

## 2020-07-01 LAB
25(OH)D3+25(OH)D2 SERPL-MCNC: 34.4 NG/ML (ref 30–100)
ALBUMIN SERPL-MCNC: 4.5 G/DL (ref 3.5–5.2)
ALBUMIN/GLOB SERPL: 1.7 G/DL
ALP SERPL-CCNC: 77 U/L (ref 39–117)
ALT SERPL-CCNC: 23 U/L (ref 1–33)
APPEARANCE UR: CLEAR
AST SERPL-CCNC: 17 U/L (ref 1–32)
BACTERIA #/AREA URNS HPF: ABNORMAL /HPF
BASOPHILS # BLD AUTO: 0.06 10*3/MM3 (ref 0–0.2)
BASOPHILS NFR BLD AUTO: 0.9 % (ref 0–1.5)
BILIRUB SERPL-MCNC: 0.7 MG/DL (ref 0.2–1.2)
BILIRUB UR QL STRIP: NEGATIVE
BUN SERPL-MCNC: 15 MG/DL (ref 8–23)
BUN/CREAT SERPL: 17.9 (ref 7–25)
CALCIUM SERPL-MCNC: 9.5 MG/DL (ref 8.6–10.5)
CASTS URNS MICRO: ABNORMAL
CHLORIDE SERPL-SCNC: 105 MMOL/L (ref 98–107)
CHOLEST SERPL-MCNC: 226 MG/DL (ref 0–200)
CK SERPL-CCNC: 77 U/L (ref 20–180)
CO2 SERPL-SCNC: 27.5 MMOL/L (ref 22–29)
COLOR UR: YELLOW
CREAT SERPL-MCNC: 0.84 MG/DL (ref 0.57–1)
EOSINOPHIL # BLD AUTO: 0.11 10*3/MM3 (ref 0–0.4)
EOSINOPHIL NFR BLD AUTO: 1.6 % (ref 0.3–6.2)
EPI CELLS #/AREA URNS HPF: ABNORMAL /HPF
ERYTHROCYTE [DISTWIDTH] IN BLOOD BY AUTOMATED COUNT: 12.7 % (ref 12.3–15.4)
FOLATE SERPL-MCNC: 12.8 NG/ML (ref 4.78–24.2)
GLOBULIN SER CALC-MCNC: 2.7 GM/DL
GLUCOSE SERPL-MCNC: 99 MG/DL (ref 65–99)
GLUCOSE UR QL: NEGATIVE
HCT VFR BLD AUTO: 41.9 % (ref 34–46.6)
HDLC SERPL-MCNC: 54 MG/DL (ref 40–60)
HGB BLD-MCNC: 14 G/DL (ref 12–15.9)
HGB UR QL STRIP: NEGATIVE
IMM GRANULOCYTES # BLD AUTO: 0.02 10*3/MM3 (ref 0–0.05)
IMM GRANULOCYTES NFR BLD AUTO: 0.3 % (ref 0–0.5)
KETONES UR QL STRIP: NEGATIVE
LDLC SERPL CALC-MCNC: 152 MG/DL (ref 0–100)
LDLC/HDLC SERPL: 2.81 {RATIO}
LEUKOCYTE ESTERASE UR QL STRIP: ABNORMAL
LYMPHOCYTES # BLD AUTO: 1.95 10*3/MM3 (ref 0.7–3.1)
LYMPHOCYTES NFR BLD AUTO: 28.8 % (ref 19.6–45.3)
MCH RBC QN AUTO: 30.4 PG (ref 26.6–33)
MCHC RBC AUTO-ENTMCNC: 33.4 G/DL (ref 31.5–35.7)
MCV RBC AUTO: 90.9 FL (ref 79–97)
MONOCYTES # BLD AUTO: 0.45 10*3/MM3 (ref 0.1–0.9)
MONOCYTES NFR BLD AUTO: 6.6 % (ref 5–12)
NEUTROPHILS # BLD AUTO: 4.19 10*3/MM3 (ref 1.7–7)
NEUTROPHILS NFR BLD AUTO: 61.8 % (ref 42.7–76)
NITRITE UR QL STRIP: POSITIVE
NRBC BLD AUTO-RTO: 0 /100 WBC (ref 0–0.2)
PH UR STRIP: 6.5 [PH] (ref 5–8)
PLATELET # BLD AUTO: 192 10*3/MM3 (ref 140–450)
POTASSIUM SERPL-SCNC: 4.4 MMOL/L (ref 3.5–5.2)
PROT SERPL-MCNC: 7.2 G/DL (ref 6–8.5)
PROT UR QL STRIP: NEGATIVE
RBC # BLD AUTO: 4.61 10*6/MM3 (ref 3.77–5.28)
RBC #/AREA URNS HPF: ABNORMAL /HPF
SODIUM SERPL-SCNC: 141 MMOL/L (ref 136–145)
SP GR UR: 1.02 (ref 1–1.03)
TRIGL SERPL-MCNC: 102 MG/DL (ref 0–150)
TSH SERPL DL<=0.005 MIU/L-ACNC: 0.74 UIU/ML (ref 0.27–4.2)
UROBILINOGEN UR STRIP-MCNC: ABNORMAL MG/DL
VIT B12 SERPL-MCNC: 557 PG/ML (ref 211–946)
VLDLC SERPL CALC-MCNC: 20.4 MG/DL
WBC # BLD AUTO: 6.78 10*3/MM3 (ref 3.4–10.8)
WBC #/AREA URNS HPF: ABNORMAL /HPF

## 2020-07-10 ENCOUNTER — PATIENT MESSAGE (OUTPATIENT)
Dept: FAMILY MEDICINE CLINIC | Facility: CLINIC | Age: 65
End: 2020-07-10

## 2020-07-10 DIAGNOSIS — E78.49 OTHER HYPERLIPIDEMIA: Primary | ICD-10-CM

## 2020-07-10 DIAGNOSIS — R82.90 ABNORMAL URINALYSIS: Primary | ICD-10-CM

## 2020-07-10 RX ORDER — ATORVASTATIN CALCIUM 10 MG/1
10 TABLET, FILM COATED ORAL NIGHTLY
Qty: 30 TABLET | Refills: 2 | Status: SHIPPED | OUTPATIENT
Start: 2020-07-10 | End: 2020-11-06 | Stop reason: SDUPTHER

## 2020-07-10 NOTE — TELEPHONE ENCOUNTER
From: Kacey Hernadez PA  To: Virginia Leiva  Sent: 7/10/2020 5:36 AM EDT  Subject: Lab result review    Cholesterol and bad cholesterol remain elevated. You need to take cholesterol medication to help protect from heart attack, stroke, and similar events. What medication have you been on in the past for cholesterol and what reactions or side effects if you have taken them? If you have not, I would like you to start Lipitor 10 mg at bedtime and follow up with me in 3 months, fasting.     Your urine was abnormal. Are you having any symptoms of urinary tract infection- urinating frequently, urgently, burning with urination, increased back pain, lower abdominal pain, fever? I will place an order for a urine culture if you will return to leave another sample.     Please let me know you received this message and any questions you have regarding your results and recommendations. Please schedule follow up through [facility] or by calling the office.

## 2020-07-13 ENCOUNTER — TELEPHONE (OUTPATIENT)
Dept: FAMILY MEDICINE CLINIC | Facility: CLINIC | Age: 65
End: 2020-07-13

## 2020-07-13 NOTE — TELEPHONE ENCOUNTER
----- Message from Virginia LANIER Cali sent at 7/10/2020  5:14 PM EDT -----  Regarding: RE: Lab result review  Contact: [phone]  Can I get a 90 day supply sent to [facility]? Did you send it to [facility]?     ----- Message -----  From: RIP Scruggs  Sent: 7/10/20, 11:21 AM  To: Virginia Leiva  Subject: RE: Lab result review    I sent in the Lipitor to take at bedtime.  Please let me know if you have any concerns or side effects with this medication.  Please also schedule to return to leave another urine sample.  Please schedule follow-up with me in 3 months, fasting.  Let me know if you have any other concerns prior.    ----- Message -----     From: Virginia S Cali     Sent: 7/10/2020  8:37 AM EDT       To: RIP Scruggs  Subject: RE: Lab result review    I don't take any cholesterol meds at all. I dont know if it matters, but I drink quite a bit of pink lemonade erika-aid which has citric acid in it. No problems otherwise  ----- Message -----  From: RIP Scruggs  Sent: 7/10/20, 5:36 AM  To: Virginia Leiva  Subject: Lab result review    Cholesterol and bad cholesterol remain elevated. You need to take cholesterol medication to help protect from heart attack, stroke, and similar events. What medication have you been on in the past for cholesterol and what reactions or side effects if you have taken them? If you have not, I would like you to start Lipitor 10 mg at bedtime and follow up with me in 3 months, fasting.     Your urine was abnormal. Are you having any symptoms of urinary tract infection- urinating frequently, urgently, burning with urination, increased back pain, lower abdominal pain, fever? I will place an order for a urine culture if you will return to leave another sample.     Please let me know you received this message and any questions you have regarding your results and recommendations. Please schedule follow up through [facility] or by calling the office.

## 2020-07-13 NOTE — TELEPHONE ENCOUNTER
Please see if we can get the records from Dr. Julian's office.  I ordered a foot x-ray, however, I do not see that she had it completed.  I am glad she was seen by the podiatrist.  I did 30-day supply initially to ensure she tolerates the medication and has no concerns.  If she wants to call me a week before her refill is due, we can send to 90-day mail order.  I do not usually like send initial new medications to mail order in case the patient does not tolerate them.  Insurance should cover a new medication at a local pharmacy.

## 2020-07-16 ENCOUNTER — HOSPITAL ENCOUNTER (OUTPATIENT)
Dept: MRI IMAGING | Facility: HOSPITAL | Age: 65
Discharge: HOME | End: 2020-07-16

## 2020-07-16 ENCOUNTER — HOSPITAL ENCOUNTER (OUTPATIENT)
Dept: CARDIOLOGY | Facility: HOSPITAL | Age: 65
Discharge: HOME | End: 2020-07-16
Admitting: PHYSICIAN ASSISTANT

## 2020-07-16 DIAGNOSIS — H93.19 TINNITUS, UNSPECIFIED LATERALITY: ICD-10-CM

## 2020-07-16 DIAGNOSIS — R42 DIZZINESS: ICD-10-CM

## 2020-07-16 LAB
BH CV XLRA MEAS LEFT DIST CCA EDV: -26.1 CM/SEC
BH CV XLRA MEAS LEFT DIST CCA PSV: -80.4 CM/SEC
BH CV XLRA MEAS LEFT DIST ICA EDV: -29.4 CM/SEC
BH CV XLRA MEAS LEFT DIST ICA PSV: -71.5 CM/SEC
BH CV XLRA MEAS LEFT ICA/CCA RATIO: 0.96
BH CV XLRA MEAS LEFT MID ICA EDV: -20.3 CM/SEC
BH CV XLRA MEAS LEFT MID ICA PSV: -53.9 CM/SEC
BH CV XLRA MEAS LEFT PROX CCA EDV: 27.1 CM/SEC
BH CV XLRA MEAS LEFT PROX CCA PSV: 99.7 CM/SEC
BH CV XLRA MEAS LEFT PROX ECA EDV: -15.5 CM/SEC
BH CV XLRA MEAS LEFT PROX ECA PSV: -93 CM/SEC
BH CV XLRA MEAS LEFT PROX ICA EDV: -29.5 CM/SEC
BH CV XLRA MEAS LEFT PROX ICA PSV: -77.1 CM/SEC
BH CV XLRA MEAS LEFT PROX SCLA PSV: 152.1 CM/SEC
BH CV XLRA MEAS LEFT VERTEBRAL A EDV: -10.5 CM/SEC
BH CV XLRA MEAS LEFT VERTEBRAL A PSV: -44.8 CM/SEC
BH CV XLRA MEAS RIGHT DIST CCA EDV: 28 CM/SEC
BH CV XLRA MEAS RIGHT DIST CCA PSV: 92.6 CM/SEC
BH CV XLRA MEAS RIGHT DIST ICA EDV: -25.9 CM/SEC
BH CV XLRA MEAS RIGHT DIST ICA PSV: -87.6 CM/SEC
BH CV XLRA MEAS RIGHT ICA/CCA RATIO: 0.95
BH CV XLRA MEAS RIGHT MID ICA EDV: -33.6 CM/SEC
BH CV XLRA MEAS RIGHT MID ICA PSV: -75 CM/SEC
BH CV XLRA MEAS RIGHT PROX CCA EDV: 22.1 CM/SEC
BH CV XLRA MEAS RIGHT PROX CCA PSV: 90.9 CM/SEC
BH CV XLRA MEAS RIGHT PROX ECA EDV: -17.3 CM/SEC
BH CV XLRA MEAS RIGHT PROX ECA PSV: -100 CM/SEC
BH CV XLRA MEAS RIGHT PROX ICA EDV: -18.2 CM/SEC
BH CV XLRA MEAS RIGHT PROX ICA PSV: -73.6 CM/SEC
BH CV XLRA MEAS RIGHT PROX SCLA PSV: 139.5 CM/SEC
BH CV XLRA MEAS RIGHT VERTEBRAL A EDV: -20.3 CM/SEC
BH CV XLRA MEAS RIGHT VERTEBRAL A PSV: -67.3 CM/SEC
LEFT ARM BP: NORMAL MMHG
RIGHT ARM BP: NORMAL MMHG

## 2020-07-16 PROCEDURE — A9577 INJ MULTIHANCE: HCPCS | Performed by: PHYSICIAN ASSISTANT

## 2020-07-16 PROCEDURE — 93880 EXTRACRANIAL BILAT STUDY: CPT

## 2020-07-16 PROCEDURE — 70553 MRI BRAIN STEM W/O & W/DYE: CPT

## 2020-07-16 PROCEDURE — 0 GADOBENATE DIMEGLUMINE 529 MG/ML SOLUTION: Performed by: PHYSICIAN ASSISTANT

## 2020-07-16 RX ADMIN — GADOBENATE DIMEGLUMINE 20 ML: 529 INJECTION, SOLUTION INTRAVENOUS at 17:47

## 2020-07-17 DIAGNOSIS — J32.2 ETHMOID SINUSITIS, UNSPECIFIED CHRONICITY: Primary | ICD-10-CM

## 2020-07-17 RX ORDER — CEFDINIR 300 MG/1
300 CAPSULE ORAL 2 TIMES DAILY
Qty: 28 CAPSULE | Refills: 0 | Status: SHIPPED | OUTPATIENT
Start: 2020-07-17 | End: 2020-10-30

## 2020-08-07 DIAGNOSIS — K22.2 ESOPHAGEAL STRICTURE: ICD-10-CM

## 2020-08-07 DIAGNOSIS — K21.9 GASTROESOPHAGEAL REFLUX DISEASE, ESOPHAGITIS PRESENCE NOT SPECIFIED: ICD-10-CM

## 2020-08-07 RX ORDER — OMEPRAZOLE 20 MG/1
20 CAPSULE, DELAYED RELEASE ORAL DAILY
Qty: 90 CAPSULE | Refills: 0 | Status: SHIPPED | OUTPATIENT
Start: 2020-08-07 | End: 2020-10-23

## 2020-10-23 DIAGNOSIS — K21.9 GASTROESOPHAGEAL REFLUX DISEASE: ICD-10-CM

## 2020-10-23 DIAGNOSIS — K22.2 ESOPHAGEAL STRICTURE: ICD-10-CM

## 2020-10-23 RX ORDER — OMEPRAZOLE 20 MG/1
CAPSULE, DELAYED RELEASE ORAL
Qty: 90 CAPSULE | Refills: 0 | Status: SHIPPED | OUTPATIENT
Start: 2020-10-23 | End: 2021-01-19 | Stop reason: SDUPTHER

## 2020-10-30 ENCOUNTER — OFFICE VISIT (OUTPATIENT)
Dept: FAMILY MEDICINE CLINIC | Facility: CLINIC | Age: 65
End: 2020-10-30

## 2020-10-30 VITALS
SYSTOLIC BLOOD PRESSURE: 124 MMHG | HEIGHT: 63 IN | HEART RATE: 79 BPM | WEIGHT: 210 LBS | DIASTOLIC BLOOD PRESSURE: 80 MMHG | BODY MASS INDEX: 37.21 KG/M2 | TEMPERATURE: 98.3 F | OXYGEN SATURATION: 97 %

## 2020-10-30 DIAGNOSIS — F41.8 DEPRESSION WITH ANXIETY: ICD-10-CM

## 2020-10-30 DIAGNOSIS — G89.29 BILATERAL CHRONIC KNEE PAIN: ICD-10-CM

## 2020-10-30 DIAGNOSIS — M25.561 BILATERAL CHRONIC KNEE PAIN: ICD-10-CM

## 2020-10-30 DIAGNOSIS — E55.9 VITAMIN D DEFICIENCY: ICD-10-CM

## 2020-10-30 DIAGNOSIS — J32.2 ETHMOID SINUSITIS, UNSPECIFIED CHRONICITY: ICD-10-CM

## 2020-10-30 DIAGNOSIS — E78.49 OTHER HYPERLIPIDEMIA: Primary | ICD-10-CM

## 2020-10-30 DIAGNOSIS — H93.19 TINNITUS, UNSPECIFIED LATERALITY: ICD-10-CM

## 2020-10-30 DIAGNOSIS — E53.8 B12 DEFICIENCY: ICD-10-CM

## 2020-10-30 DIAGNOSIS — M54.40 ACUTE RIGHT-SIDED LOW BACK PAIN WITH SCIATICA, SCIATICA LATERALITY UNSPECIFIED: ICD-10-CM

## 2020-10-30 DIAGNOSIS — I65.23 ATHEROSCLEROSIS OF BOTH CAROTID ARTERIES: ICD-10-CM

## 2020-10-30 DIAGNOSIS — R42 DIZZINESS: ICD-10-CM

## 2020-10-30 DIAGNOSIS — K21.9 GASTROESOPHAGEAL REFLUX DISEASE, UNSPECIFIED WHETHER ESOPHAGITIS PRESENT: ICD-10-CM

## 2020-10-30 DIAGNOSIS — D75.89 MACROCYTOSIS: ICD-10-CM

## 2020-10-30 DIAGNOSIS — M25.562 BILATERAL CHRONIC KNEE PAIN: ICD-10-CM

## 2020-10-30 DIAGNOSIS — M19.90 ARTHRITIS: ICD-10-CM

## 2020-10-30 PROBLEM — S64.40XA DIGITAL NERVE LACERATION, FINGER: Status: ACTIVE | Noted: 2020-10-23

## 2020-10-30 PROCEDURE — 99214 OFFICE O/P EST MOD 30 MIN: CPT | Performed by: PHYSICIAN ASSISTANT

## 2020-10-31 LAB
25(OH)D3+25(OH)D2 SERPL-MCNC: 36.1 NG/ML (ref 30–100)
ALBUMIN SERPL-MCNC: 4.3 G/DL (ref 3.5–5.2)
ALBUMIN/GLOB SERPL: 1.9 G/DL
ALP SERPL-CCNC: 94 U/L (ref 39–117)
ALT SERPL-CCNC: 24 U/L (ref 1–33)
AST SERPL-CCNC: 17 U/L (ref 1–32)
BASOPHILS # BLD AUTO: 0.05 10*3/MM3 (ref 0–0.2)
BASOPHILS NFR BLD AUTO: 0.9 % (ref 0–1.5)
BILIRUB SERPL-MCNC: 0.8 MG/DL (ref 0–1.2)
BUN SERPL-MCNC: 12 MG/DL (ref 8–23)
BUN/CREAT SERPL: 12.9 (ref 7–25)
CALCIUM SERPL-MCNC: 9.2 MG/DL (ref 8.6–10.5)
CHLORIDE SERPL-SCNC: 106 MMOL/L (ref 98–107)
CHOLEST SERPL-MCNC: 175 MG/DL (ref 0–200)
CK SERPL-CCNC: 73 U/L (ref 20–180)
CO2 SERPL-SCNC: 26.2 MMOL/L (ref 22–29)
CREAT SERPL-MCNC: 0.93 MG/DL (ref 0.57–1)
EOSINOPHIL # BLD AUTO: 0.15 10*3/MM3 (ref 0–0.4)
EOSINOPHIL NFR BLD AUTO: 2.7 % (ref 0.3–6.2)
ERYTHROCYTE [DISTWIDTH] IN BLOOD BY AUTOMATED COUNT: 12.9 % (ref 12.3–15.4)
FOLATE SERPL-MCNC: 9.46 NG/ML (ref 4.78–24.2)
GLOBULIN SER CALC-MCNC: 2.3 GM/DL
GLUCOSE SERPL-MCNC: 83 MG/DL (ref 65–99)
HCT VFR BLD AUTO: 43.9 % (ref 34–46.6)
HDLC SERPL-MCNC: 48 MG/DL (ref 40–60)
HGB BLD-MCNC: 14.2 G/DL (ref 12–15.9)
IMM GRANULOCYTES # BLD AUTO: 0.01 10*3/MM3 (ref 0–0.05)
IMM GRANULOCYTES NFR BLD AUTO: 0.2 % (ref 0–0.5)
LDLC SERPL CALC-MCNC: 103 MG/DL (ref 0–100)
LDLC/HDLC SERPL: 2.09 {RATIO}
LYMPHOCYTES # BLD AUTO: 1.89 10*3/MM3 (ref 0.7–3.1)
LYMPHOCYTES NFR BLD AUTO: 33.8 % (ref 19.6–45.3)
MCH RBC QN AUTO: 30.3 PG (ref 26.6–33)
MCHC RBC AUTO-ENTMCNC: 32.3 G/DL (ref 31.5–35.7)
MCV RBC AUTO: 93.8 FL (ref 79–97)
MONOCYTES # BLD AUTO: 0.34 10*3/MM3 (ref 0.1–0.9)
MONOCYTES NFR BLD AUTO: 6.1 % (ref 5–12)
NEUTROPHILS # BLD AUTO: 3.16 10*3/MM3 (ref 1.7–7)
NEUTROPHILS NFR BLD AUTO: 56.3 % (ref 42.7–76)
NRBC BLD AUTO-RTO: 0 /100 WBC (ref 0–0.2)
PLATELET # BLD AUTO: 181 10*3/MM3 (ref 140–450)
POTASSIUM SERPL-SCNC: 4.3 MMOL/L (ref 3.5–5.2)
PROT SERPL-MCNC: 6.6 G/DL (ref 6–8.5)
RBC # BLD AUTO: 4.68 10*6/MM3 (ref 3.77–5.28)
SODIUM SERPL-SCNC: 141 MMOL/L (ref 136–145)
TRIGL SERPL-MCNC: 133 MG/DL (ref 0–150)
VIT B12 SERPL-MCNC: 726 PG/ML (ref 211–946)
VLDLC SERPL CALC-MCNC: 24 MG/DL (ref 5–40)
WBC # BLD AUTO: 5.6 10*3/MM3 (ref 3.4–10.8)

## 2020-11-04 ENCOUNTER — PATIENT MESSAGE (OUTPATIENT)
Dept: FAMILY MEDICINE CLINIC | Facility: CLINIC | Age: 65
End: 2020-11-04

## 2020-11-04 DIAGNOSIS — E78.49 OTHER HYPERLIPIDEMIA: ICD-10-CM

## 2020-11-05 DIAGNOSIS — E78.49 OTHER HYPERLIPIDEMIA: ICD-10-CM

## 2020-11-05 RX ORDER — ATORVASTATIN CALCIUM 10 MG/1
10 TABLET, FILM COATED ORAL NIGHTLY
Qty: 30 TABLET | Refills: 2 | Status: CANCELLED | OUTPATIENT
Start: 2020-11-05

## 2020-11-06 RX ORDER — ATORVASTATIN CALCIUM 10 MG/1
10 TABLET, FILM COATED ORAL NIGHTLY
Qty: 90 TABLET | Refills: 1 | Status: SHIPPED | OUTPATIENT
Start: 2020-11-06 | End: 2021-03-08

## 2020-11-06 NOTE — TELEPHONE ENCOUNTER
From: Virginia Leiva  To: RIP Scruggs  Sent: 11/4/2020 2:56 AM EST  Subject: Visit Follow-Up Question    I had someone call to schedule a CT scan. My question is since I had a MRI in July, why is this necessary?     I don't want to do this again.    I did see where my cholesterol was lower, so are you going to refill my statin? I would prefer a 90 day supply sent to [facility].    If my dizziness gets worse, I'll let you know. For now, I just would rather pass on the CT since the MRI should have been sufficient.

## 2020-11-11 ENCOUNTER — TELEPHONE (OUTPATIENT)
Dept: FAMILY MEDICINE CLINIC | Facility: CLINIC | Age: 65
End: 2020-11-11

## 2020-11-12 NOTE — TELEPHONE ENCOUNTER
"----- Message from Virginia Leiva sent at 11/9/2020  5:24 AM EST -----  Regarding: RE: Visit Follow-Up Question  Contact: [phone]  I just got a message from [facility] that my statin has been declined . I'm confused     ----- Message -----  From: RIP Scruggs  Sent: 11/6/20, 5:00 PM  To: Virginia Leiva  Subject: RE: Visit Follow-Up Question    I have refilled your atorvastatin at 10 mg.  Cholesterol has improved–bad cholesterol helena slightly elevated.  Continue to work on diet and exercise–decreasing fats and increasing cardiovascular exercise to 30 to 45 minutes, 5 to 6 days/week.  Please see result note–    \"Labs have improved significantly.  Bad cholesterol remains slightly elevated but has improved about 50 points.  Continue medication at current dose and continue to work diligently on diet and exercise-decrease fats and increase exercise.  I will refill atorvastatin at current dose.  Follow-up with me in 4 months, fasting.  If continued slight elevation in bad cholesterol, we will increase to 20 mg.     B12 and vitamin D look better.  Continue vitamin D and B12 supplements.  I will recheck at 4-month follow-up.\"    Has the dizziness completely resolved?  What about the ringing in your ears?  If you are feeling okay and do not want to have the testing, we can discuss at follow-up.  Please ensure follow-up or testing is scheduled if you have recurrence of symptoms.    Have a great weekend,  Kacey Hernadez PA-C      ----- Message -----       From:Virginia Leiva       Sent:11/4/2020  2:56 AM EST         To:RIP Scruggs    Subject:Visit Follow-Up Question    I had someone call to schedule a CT scan. My question is since I had a MRI in July, why is this necessary?     I don't want to do this again.    I did see where my cholesterol was lower, so are you going to refill my statin? I would prefer a 90 day supply sent to [facility].    If my dizziness gets worse, I'll let you know. For now, I " just would rather pass on the CT since the MRI should have been sufficient.

## 2020-11-22 DIAGNOSIS — K22.2 ESOPHAGEAL STRICTURE: ICD-10-CM

## 2020-11-22 DIAGNOSIS — K21.9 GASTROESOPHAGEAL REFLUX DISEASE: ICD-10-CM

## 2020-11-23 RX ORDER — FLUOXETINE HYDROCHLORIDE 20 MG/1
CAPSULE ORAL
Qty: 90 CAPSULE | Refills: 1 | Status: SHIPPED | OUTPATIENT
Start: 2020-11-23 | End: 2021-05-26

## 2021-01-19 DIAGNOSIS — K21.9 GASTROESOPHAGEAL REFLUX DISEASE: ICD-10-CM

## 2021-01-19 DIAGNOSIS — K22.2 ESOPHAGEAL STRICTURE: ICD-10-CM

## 2021-01-19 RX ORDER — OMEPRAZOLE 20 MG/1
20 CAPSULE, DELAYED RELEASE ORAL DAILY
Qty: 90 CAPSULE | Refills: 0 | Status: SHIPPED | OUTPATIENT
Start: 2021-01-19 | End: 2021-03-08

## 2021-02-26 ENCOUNTER — OFFICE VISIT (OUTPATIENT)
Dept: FAMILY MEDICINE CLINIC | Facility: CLINIC | Age: 66
End: 2021-02-26

## 2021-02-26 VITALS
RESPIRATION RATE: 16 BRPM | HEART RATE: 67 BPM | DIASTOLIC BLOOD PRESSURE: 82 MMHG | WEIGHT: 209 LBS | BODY MASS INDEX: 37.03 KG/M2 | HEIGHT: 63 IN | TEMPERATURE: 98.2 F | OXYGEN SATURATION: 97 % | SYSTOLIC BLOOD PRESSURE: 120 MMHG

## 2021-02-26 DIAGNOSIS — E78.49 OTHER HYPERLIPIDEMIA: Primary | ICD-10-CM

## 2021-02-26 DIAGNOSIS — M19.90 ARTHRITIS: ICD-10-CM

## 2021-02-26 DIAGNOSIS — M54.40 ACUTE RIGHT-SIDED LOW BACK PAIN WITH SCIATICA, SCIATICA LATERALITY UNSPECIFIED: ICD-10-CM

## 2021-02-26 DIAGNOSIS — K22.2 ESOPHAGEAL STRICTURE: ICD-10-CM

## 2021-02-26 DIAGNOSIS — K21.9 GASTROESOPHAGEAL REFLUX DISEASE, UNSPECIFIED WHETHER ESOPHAGITIS PRESENT: ICD-10-CM

## 2021-02-26 DIAGNOSIS — M25.562 BILATERAL CHRONIC KNEE PAIN: ICD-10-CM

## 2021-02-26 DIAGNOSIS — H93.19 TINNITUS, UNSPECIFIED LATERALITY: ICD-10-CM

## 2021-02-26 DIAGNOSIS — R42 DIZZINESS: ICD-10-CM

## 2021-02-26 DIAGNOSIS — I65.23 ATHEROSCLEROSIS OF BOTH CAROTID ARTERIES: ICD-10-CM

## 2021-02-26 DIAGNOSIS — J32.2 ETHMOID SINUSITIS, UNSPECIFIED CHRONICITY: ICD-10-CM

## 2021-02-26 DIAGNOSIS — F41.8 DEPRESSION WITH ANXIETY: ICD-10-CM

## 2021-02-26 DIAGNOSIS — G89.29 BILATERAL CHRONIC KNEE PAIN: ICD-10-CM

## 2021-02-26 DIAGNOSIS — M25.561 BILATERAL CHRONIC KNEE PAIN: ICD-10-CM

## 2021-02-26 DIAGNOSIS — E55.9 VITAMIN D DEFICIENCY: ICD-10-CM

## 2021-02-26 DIAGNOSIS — E53.8 B12 DEFICIENCY: ICD-10-CM

## 2021-02-26 DIAGNOSIS — D75.89 MACROCYTOSIS: ICD-10-CM

## 2021-02-26 PROCEDURE — 99214 OFFICE O/P EST MOD 30 MIN: CPT | Performed by: PHYSICIAN ASSISTANT

## 2021-02-26 NOTE — PROGRESS NOTES
Subjective   Virginia Leiva is a 65 y.o. female who presents today in follow-up of hyperlipidemia, vitamin D and B12 deficiencies, macrocytosis, GERD, moods, dizziness, back pain, arthritis, and history of fecal incontenence.    History of Present Illness   Answers for HPI/ROS submitted by the patient on 2/24/2021   What is the primary reason for your visit?: Other  Please describe your symptoms.: Follow up only  Have you had these symptoms before?: Yes  How long have you been having these symptoms?: 1-4 days  Please list any medications you are currently taking for this condition.: None  Please describe any probable cause for these symptoms. : Na    Hyperlipidemia-has done well with starting atorvastatin and is tolerating without AE.  Vitamin D-taking 1000 IU once daily.  U23-exkkd not find 100 mcg tablet so she is taking B12 500 mcg 3 x weekly.    GERD-has been stable on Prilosec without breakthrough symptoms.   · She had an EGD 5/2018 without stricture or need for dilation (history of stricture in the past).  She was noted to have medium/moderate hiatal hernia and mucosa was suggestive of eosinophilic esophagitis with negative biopsies for eosinophils.  He recommended repeat EGD in 3 to 5 years.    Moods-well controlled on Prozac 20 mg once daily.  Tolerating without AE. Feels it is the correct dose.     Dizziness/tinnitus- continues with dizziness.   · Dizziness started 2/2020. When she gets up, she feels like her eyes are twitching and very occasionally feels some spinning. Symptoms occur seldom when she was driving down the road, and she got nervous to focus and see. No consistent dizziness with head turning and no dizziness with laying down. She has had ringing in her ears for years with normal hearing and no ear pain. If she lays on 1 side too long, she has a headache- worse on the left. She denies confusion, trouble speaking, trouble using arms/ legs, and blurred vision. She has had numbness in hands  intermittent but no other numbness or neurological symptoms.   · Last opt/ophthalmology was > 1 year ago. She was advised to go for annual eye exam.  · I referred for MRI brain and IAC with opacified hypoplastic right sphenoid sinus. She was treated for sinusitis and advised we can send to ENT.   · Carotid duplex with bilateral plaque without stenosis.    · ENT- Dr Lozano- he did ENG and testing. Told slight hearing loss but no other etiology of symtpoms.     Back pain-No worsening, changing, new or different symptoms. She has not had any increased symptoms.  Patient uses Flexeril only occasionally with back pain. She was told it was arthritis at some point.   Arthritis-She uses Tylenol as needed.  She had right MCP surgery 10/2018 and left MCP surgery 12/2018 and healed well.    She also has chronic left knee pain, intermittent feels like it going to give out. She has been seen by orthopedic surgery with arthritis of her knees.      History of Fecal incontinence- no recurrence. Doing ok.   · She noted change in BM 8/2018 with increased fecal urgency and incontinence.  She also had stress fecal incontinence.  She had significant hemorrhoids on exam and possible mild rectal prolapse as well with decreased rectal tone.  Patient wanted to start with follow-up with Dr. Covarrubias but did not schedule follow-up with him.  She reports symptoms improved.    · I discussed the need for follow-up with GI, MRI lumbosacral region, and consideration of further neurological work-up as well as consideration of pelvic floor strengthening PT.  She did not want to proceed with further work-up at that time and has had no recurrence of the symptoms.    Patient's Specialists:  Cardiology- Dr South- last appt 9/2018 for preop cleareance. EKG thought to be negative. Hyperlipidemia- advised low dose statin and risk reduction. Follow up PRN.   GI-Dr Covarrubias- last EGD 5/2018 medium hiatal hernia. Repeat in 3-5 years.   Hand surgery- Dr Tejada-  last appt 10/2020 for finger laceration. Radial digital nerve laceration left index finger with repair with vein graft. Prior 1/2019 for left thumb carpometacarpal arthoplasty with LRTI and left de Quervain's release.   Orthopedic surgery- Dr Lee Roberto- last appt 8/2017 for patellofemoral arthritis left knee. Received injection. Advied try tumeric or glucosamine. Follow up PRN.   ENT- Dr Lozano- last appt 8/2020 for dizziness and tinnitus. Thought to be vascular blood supply and ordered ENG. Treated a second time for blocked sphenoid sinus.   Podiatry- Dr Julian- last appt 7/2020 for 5th metatarsal fracture. Advised MELONIE boot.     The following portions of the patient's history were reviewed and updated as appropriate: allergies, current medications, past family history, past medical history, past social history, past surgical history and problem list.    Review of Systems   Constitutional: Negative.  Negative for fever.   HENT: Positive for tinnitus. Negative for congestion, ear pain, postnasal drip and sore throat.    Respiratory: Negative.  Negative for shortness of breath.    Cardiovascular: Negative.    Gastrointestinal: Negative.    Genitourinary: Negative.    Musculoskeletal: Positive for arthralgias and back pain.   Neurological: Positive for dizziness.   Psychiatric/Behavioral: Negative.        Objective    Vitals:    02/26/21 0820   BP: 120/82   Pulse: 67   Resp: 16   Temp: 98.2 °F (36.8 °C)   SpO2: 97%     Body mass index is 37.03 kg/m².    Physical Exam   Constitutional: She is oriented to person, place, and time. She appears well-developed. No distress.   HENT:   Head: Normocephalic and atraumatic.   Right Ear: External ear normal.   Left Ear: External ear normal.   Nose: Nose normal.   Eyes: Conjunctivae and lids are normal. Right eye exhibits no discharge. Left eye exhibits no discharge.   Neck: Neck supple. Carotid bruit is not present.   Cardiovascular: Normal rate, regular rhythm, normal  heart sounds and normal pulses. Exam reveals no gallop and no friction rub.   No murmur heard.  Pulmonary/Chest: Effort normal and breath sounds normal. No respiratory distress. She has no wheezes. She has no rhonchi. She has no rales.   Abdominal: Normal appearance.   Musculoskeletal: No deformity.   Neurological: She is alert and oriented to person, place, and time. Gait normal.   Skin: Skin is warm and dry.   Psychiatric: Her speech is normal and behavior is normal. Mood, memory, affect, judgment and thought content normal. She is attentive.   Nursing note and vitals reviewed.      Assessment/Plan   Diagnoses and all orders for this visit:    1. Other hyperlipidemia (Primary)  -     Comprehensive Metabolic Panel  -     CK  -     Lipid Panel With LDL / HDL Ratio    2. Vitamin D deficiency  -     Comprehensive Metabolic Panel  -     Vitamin D 25 Hydroxy    3. B12 deficiency  -     CBC & Differential  -     Vitamin B12 & Folate    4. Macrocytosis  -     CBC & Differential  -     Vitamin B12 & Folate    5. Depression with anxiety    6. Gastroesophageal reflux disease, unspecified whether esophagitis present    7. Esophageal stricture    8. Dizziness  -     Ambulatory Referral to Neurology    9. Tinnitus, unspecified laterality    10. Ethmoid sinusitis, unspecified chronicity    11. Atherosclerosis of both carotid arteries    12. Acute right-sided low back pain with sciatica, sciatica laterality unspecified    13. Arthritis    14. Bilateral chronic knee pain         Assessment and plan  Patient will have fasting labs. Call if no results in 1 week. Stability of conditions, plan, follow up, and further recommendations pending labs.  If stable, follow-up in 6 months.    · Hyperlipidemia-Last lipids were borderline. Continue atorvastatin 10 mg at bedtime.  Further treatment recommendations pending labs.     · Vitamin D deficiency-Continues on vitamin D 1000 IU daily. Dosing recommendations following labs.   · B12  deficiency-Continue B12 1000 mcg twice weekly. Await labs for further recommendations.    · GERD-Controlled symptoms. Continue Prilosec 20 mg once daily. Patient needs to schedule repeat EGD 5/2021.  · Depression with anxiety- Moods are stable. Continue Prozac 20 mg once daily. Follow up if worsening moods or AE with medication.    · Dizziness/ Tinnitus- I referred for MRI brain and IAC as well as carotid duplex. Some sinus disease but otherwise negative. CTA head and neck to ensure no cerbrovascular etiology of symptoms and advised BPPV physical therapy/ exercises. She was seen by ENT who thought there could be a vascular etiology. They ordered ENG- no etiology of symptoms.  I will refer to neurosurgery .   · Back pain- Stable- Continue Tylenol and Flexeril as needed.   · Arthritis-Stable- to continue Tylenol as needed. To see hand surgery or orthopedic surgery if worsening.  · Fecal incontinence- If recurrence of symptoms or other symptoms, consider follow up with GI, the need for MRI lumbosacral region, consideration of further neurological work-up, as well as consideration of pelvic floor strengthening PT.  She should let me know if she is willing to proceed with further work-up.    I spent 30 minutes caring for Virginia Leiva on this date of service. This time includes time spent by me in the following activities: preparing for the visit, reviewing tests, specialists records, and previous visits, obtaining and/or reviewing a separately obtained history, performing a medically appropriate examination and/or evaluation, counseling and educating the patient/family/caregiver, referring and/or communicating with other health care professionals as necessary, documenting information in the medical record, independently interpreting results and communicating that information with the patient/family/caregiver, and developing a medically appropriate treatment plan with consideration of other conditions, medications, and  treatments.

## 2021-02-27 LAB
25(OH)D3+25(OH)D2 SERPL-MCNC: 30.7 NG/ML (ref 30–100)
ALBUMIN SERPL-MCNC: 4.3 G/DL (ref 3.5–5.2)
ALBUMIN/GLOB SERPL: 1.7 G/DL
ALP SERPL-CCNC: 93 U/L (ref 39–117)
ALT SERPL-CCNC: 21 U/L (ref 1–33)
AST SERPL-CCNC: 20 U/L (ref 1–32)
BASOPHILS # BLD AUTO: 0.04 10*3/MM3 (ref 0–0.2)
BASOPHILS NFR BLD AUTO: 0.6 % (ref 0–1.5)
BILIRUB SERPL-MCNC: 0.9 MG/DL (ref 0–1.2)
BUN SERPL-MCNC: 12 MG/DL (ref 8–23)
BUN/CREAT SERPL: 12.1 (ref 7–25)
CALCIUM SERPL-MCNC: 9.6 MG/DL (ref 8.6–10.5)
CHLORIDE SERPL-SCNC: 105 MMOL/L (ref 98–107)
CHOLEST SERPL-MCNC: 165 MG/DL (ref 0–200)
CK SERPL-CCNC: 88 U/L (ref 20–180)
CO2 SERPL-SCNC: 27.8 MMOL/L (ref 22–29)
CREAT SERPL-MCNC: 0.99 MG/DL (ref 0.57–1)
EOSINOPHIL # BLD AUTO: 0.15 10*3/MM3 (ref 0–0.4)
EOSINOPHIL NFR BLD AUTO: 2.3 % (ref 0.3–6.2)
ERYTHROCYTE [DISTWIDTH] IN BLOOD BY AUTOMATED COUNT: 12.8 % (ref 12.3–15.4)
FOLATE SERPL-MCNC: 5.45 NG/ML (ref 4.78–24.2)
GLOBULIN SER CALC-MCNC: 2.5 GM/DL
GLUCOSE SERPL-MCNC: 93 MG/DL (ref 65–99)
HCT VFR BLD AUTO: 43.9 % (ref 34–46.6)
HDLC SERPL-MCNC: 50 MG/DL (ref 40–60)
HGB BLD-MCNC: 14.3 G/DL (ref 12–15.9)
IMM GRANULOCYTES # BLD AUTO: 0.03 10*3/MM3 (ref 0–0.05)
IMM GRANULOCYTES NFR BLD AUTO: 0.5 % (ref 0–0.5)
LDLC SERPL CALC-MCNC: 95 MG/DL (ref 0–100)
LDLC/HDLC SERPL: 1.87 {RATIO}
LYMPHOCYTES # BLD AUTO: 1.87 10*3/MM3 (ref 0.7–3.1)
LYMPHOCYTES NFR BLD AUTO: 28.7 % (ref 19.6–45.3)
MCH RBC QN AUTO: 30.5 PG (ref 26.6–33)
MCHC RBC AUTO-ENTMCNC: 32.6 G/DL (ref 31.5–35.7)
MCV RBC AUTO: 93.6 FL (ref 79–97)
MONOCYTES # BLD AUTO: 0.46 10*3/MM3 (ref 0.1–0.9)
MONOCYTES NFR BLD AUTO: 7.1 % (ref 5–12)
NEUTROPHILS # BLD AUTO: 3.97 10*3/MM3 (ref 1.7–7)
NEUTROPHILS NFR BLD AUTO: 60.8 % (ref 42.7–76)
NRBC BLD AUTO-RTO: 0 /100 WBC (ref 0–0.2)
PLATELET # BLD AUTO: 196 10*3/MM3 (ref 140–450)
POTASSIUM SERPL-SCNC: 4.5 MMOL/L (ref 3.5–5.2)
PROT SERPL-MCNC: 6.8 G/DL (ref 6–8.5)
RBC # BLD AUTO: 4.69 10*6/MM3 (ref 3.77–5.28)
SODIUM SERPL-SCNC: 141 MMOL/L (ref 136–145)
TRIGL SERPL-MCNC: 108 MG/DL (ref 0–150)
VIT B12 SERPL-MCNC: 650 PG/ML (ref 211–946)
VLDLC SERPL CALC-MCNC: 20 MG/DL (ref 5–40)
WBC # BLD AUTO: 6.52 10*3/MM3 (ref 3.4–10.8)

## 2021-03-06 DIAGNOSIS — E78.49 OTHER HYPERLIPIDEMIA: ICD-10-CM

## 2021-03-06 DIAGNOSIS — K21.9 GASTROESOPHAGEAL REFLUX DISEASE: ICD-10-CM

## 2021-03-06 DIAGNOSIS — K22.2 ESOPHAGEAL STRICTURE: ICD-10-CM

## 2021-03-08 RX ORDER — ATORVASTATIN CALCIUM 10 MG/1
TABLET, FILM COATED ORAL
Qty: 90 TABLET | Refills: 1 | Status: SHIPPED | OUTPATIENT
Start: 2021-03-08 | End: 2021-06-17 | Stop reason: SDUPTHER

## 2021-03-08 RX ORDER — OMEPRAZOLE 20 MG/1
CAPSULE, DELAYED RELEASE ORAL
Qty: 90 CAPSULE | Refills: 1 | Status: SHIPPED | OUTPATIENT
Start: 2021-03-08 | End: 2021-06-26 | Stop reason: SDUPTHER

## 2021-05-22 DIAGNOSIS — K21.9 GASTROESOPHAGEAL REFLUX DISEASE: ICD-10-CM

## 2021-05-22 DIAGNOSIS — K22.2 ESOPHAGEAL STRICTURE: ICD-10-CM

## 2021-05-26 RX ORDER — FLUOXETINE HYDROCHLORIDE 20 MG/1
CAPSULE ORAL
Qty: 90 CAPSULE | Refills: 1 | Status: SHIPPED | OUTPATIENT
Start: 2021-05-26 | End: 2021-08-04 | Stop reason: SDUPTHER

## 2021-06-01 ENCOUNTER — OFFICE VISIT (OUTPATIENT)
Dept: NEUROLOGY | Facility: CLINIC | Age: 66
End: 2021-06-01

## 2021-06-01 VITALS
SYSTOLIC BLOOD PRESSURE: 118 MMHG | OXYGEN SATURATION: 96 % | HEART RATE: 92 BPM | BODY MASS INDEX: 37.39 KG/M2 | WEIGHT: 211 LBS | DIASTOLIC BLOOD PRESSURE: 68 MMHG | HEIGHT: 63 IN

## 2021-06-01 DIAGNOSIS — R42 DIZZINESS: Primary | ICD-10-CM

## 2021-06-01 PROCEDURE — 99244 OFF/OP CNSLTJ NEW/EST MOD 40: CPT | Performed by: PSYCHIATRY & NEUROLOGY

## 2021-06-01 NOTE — PROGRESS NOTES
Subjective:     Patient ID: Virginia Leiva is a 65 y.o. female.    Ms. Leiva is a 65-year-old right-handed female with history of anxiety, osteoarthritis, GERD, headaches, kidney stones, hyperlipidemia, and vitamin B12 deficiency who is seen in consultation at the request of Kacey Hernadez for the evaluation of dizziness.  I reviewed the patient's records.  Reviewed the referring providers note from February 26, 2021.  Reports that her dizziness started in February 2020.  When she gets up she feels like her eyes are twitching and very occasionally feels some spinning.  It is seldom occurred while driving.  She also has ringing in her ears.  The patient saw ENT on August 19, 2020.  It reports that she describes as a bottle headed sensation it only happens when she is upright.  Feels like her eyes are moving in the top of her head is off.  It was felt that she mostly had a vascular blood supply related dizziness.  The patient had MRI of her brain done July 16, 2020 that did not show any brain abnormalities.  CTAs of the head neck were ordered back in October but not completed.    Dizziness started over a year ago.  Symptoms have been stable.  Feels like her eyes are moving.  Sometimes feels lightheaded.  Has to prop her head on the headrest when she is driving.  May be triggered with neck extension.  Symptoms are intermittent.  Not there when she is laying down.  Gets them sitting and standing.  A lot of movement also makes the symptoms worse.  Nothing really makes them better other than propping her head.  Symptoms will last all day when she is up, everyday.  Not sure what brought them on initially.  Has ringing in her ears.  No associated headaches.  Not associated with any med changes.  Just noticed the symptoms one day.  Just never went away.  No N/V.  No numbness/tingling/weakness.  No double vision.  Tinnitus is not pulsatile.  She reports that no one has done orthostatic blood pressures on her.          The  following portions of the patient's history were reviewed and updated as appropriate: allergies, current medications, past family history, past medical history, past social history, past surgical history and problem list.    Review of Systems     Objective:    Neurologic Exam    Physical Exam   **The patient is wearing a mask**  Constitutional:  Vital signs reviewed.  No apparent distress.  Well groomed.  Eyes:  No injection, no icterus.    Respiratory:  Normal effort.  Clear to auscultation bilaterally.  Cardiovascular:  Regular rate and rhythm.  No murmurs.  No carotid bruits. Symmetric radial pulses.  Musculoskeletal: Normal station.  Gait steady.  Normal arm swing.  Patient able to walk on heels and toes.  Tandem gait intact.  Romberg negative.  Muscle tone and bulk normal in the bilateral upper and lower extremities.  Strength is 5/5 in the bilateral upper and lower extremities proximally and distally unless otherwise specified in the neurological exam.  Skin:  No rashes.  Warm, dry, and intact.  Psychiatric:  Good mood.  Normal affect.    Neurologic:  Mental status-  The patient is alert and oriented to person, place and time. Attention/concentration is within normal limits.  Speech is fluent without dysarthria.  The patient is able to name, repeat and follow complex commands without difficulty.  Immediate memory and delayed recall intact (3/3 words immediate and after 4 minutes).  Fund of knowledge normal.  Cranial nerves- Pupils equally round and reactive to light with intact accomodation.  Visual fields intact.  Extraocular movements intact.  Facial sensation intact.   Hearing intact to finger-rub bilaterally.  SCM and trapezius are 5/5 bilaterally.    Motor-  See musculoskeletal above.  No tremor.  Reflexes- 2+ and brisk in the bilateral biceps, brachioradialis, patellar and achilles.  Toes down-going bilaterally.  Sensation- Intact to pinprick and vibration in bilateral upper and lower extremities  symmetrically.  Coordination- Intact to finger tapping and heel knee shin bilaterally.   Gait- See musculoskeletal exam above.     Orthostatic blood pressures were checked and negative.      Assessment/Plan:    Mrs. Leiva is a 65-year-old right-handed female with history of anxiety, osteoarthritis, GERD, headaches, kidney stones, hyperlipidemia, and vitamin B12 deficiency who presents today for the evaluation of dizziness.    1.  Dizziness-Etiologies include neurological as well as nonneurological causes.  The patient's neurological exam is normal as well as her MRI of the brain.  I would recommend following up with the CTAs of her head and neck.  If those are negative, then further evaluation by Dr. Fang may be appropriate.       Problems Addressed this Visit     None      Visit Diagnoses     Dizziness    -  Primary    Relevant Orders    CT Angiogram Head    CT Angiogram Neck With & Without Contrast      Diagnoses       Codes Comments    Dizziness    -  Primary ICD-10-CM: R42  ICD-9-CM: 780.4

## 2021-06-17 DIAGNOSIS — E78.49 OTHER HYPERLIPIDEMIA: ICD-10-CM

## 2021-06-18 RX ORDER — ATORVASTATIN CALCIUM 10 MG/1
10 TABLET, FILM COATED ORAL NIGHTLY
Qty: 90 TABLET | Refills: 0 | Status: SHIPPED | OUTPATIENT
Start: 2021-06-18 | End: 2021-06-23 | Stop reason: SDUPTHER

## 2021-06-23 DIAGNOSIS — E78.49 OTHER HYPERLIPIDEMIA: ICD-10-CM

## 2021-06-25 RX ORDER — ATORVASTATIN CALCIUM 10 MG/1
10 TABLET, FILM COATED ORAL NIGHTLY
Qty: 90 TABLET | Refills: 0 | Status: SHIPPED | OUTPATIENT
Start: 2021-06-25 | End: 2021-08-19 | Stop reason: SDUPTHER

## 2021-06-26 DIAGNOSIS — K21.9 GASTROESOPHAGEAL REFLUX DISEASE: ICD-10-CM

## 2021-06-26 DIAGNOSIS — K22.2 ESOPHAGEAL STRICTURE: ICD-10-CM

## 2021-06-28 RX ORDER — OMEPRAZOLE 20 MG/1
20 CAPSULE, DELAYED RELEASE ORAL DAILY
Qty: 90 CAPSULE | Refills: 0 | Status: SHIPPED | OUTPATIENT
Start: 2021-06-28 | End: 2021-08-19 | Stop reason: SDUPTHER

## 2021-08-04 ENCOUNTER — OFFICE VISIT (OUTPATIENT)
Dept: FAMILY MEDICINE CLINIC | Facility: CLINIC | Age: 66
End: 2021-08-04

## 2021-08-04 VITALS
WEIGHT: 209 LBS | SYSTOLIC BLOOD PRESSURE: 118 MMHG | HEIGHT: 63 IN | DIASTOLIC BLOOD PRESSURE: 80 MMHG | HEART RATE: 91 BPM | TEMPERATURE: 98 F | BODY MASS INDEX: 37.03 KG/M2 | OXYGEN SATURATION: 96 %

## 2021-08-04 DIAGNOSIS — I65.23 ATHEROSCLEROSIS OF BOTH CAROTID ARTERIES: ICD-10-CM

## 2021-08-04 DIAGNOSIS — H93.19 TINNITUS, UNSPECIFIED LATERALITY: ICD-10-CM

## 2021-08-04 DIAGNOSIS — D75.89 MACROCYTOSIS: ICD-10-CM

## 2021-08-04 DIAGNOSIS — R42 DIZZINESS: ICD-10-CM

## 2021-08-04 DIAGNOSIS — M19.90 ARTHRITIS: ICD-10-CM

## 2021-08-04 DIAGNOSIS — M25.561 BILATERAL CHRONIC KNEE PAIN: ICD-10-CM

## 2021-08-04 DIAGNOSIS — G89.29 BILATERAL CHRONIC KNEE PAIN: ICD-10-CM

## 2021-08-04 DIAGNOSIS — K21.9 GASTROESOPHAGEAL REFLUX DISEASE, UNSPECIFIED WHETHER ESOPHAGITIS PRESENT: ICD-10-CM

## 2021-08-04 DIAGNOSIS — F41.8 DEPRESSION WITH ANXIETY: ICD-10-CM

## 2021-08-04 DIAGNOSIS — M25.562 BILATERAL CHRONIC KNEE PAIN: ICD-10-CM

## 2021-08-04 DIAGNOSIS — E55.9 VITAMIN D DEFICIENCY: ICD-10-CM

## 2021-08-04 DIAGNOSIS — E78.49 OTHER HYPERLIPIDEMIA: Primary | ICD-10-CM

## 2021-08-04 DIAGNOSIS — E53.8 B12 DEFICIENCY: ICD-10-CM

## 2021-08-04 DIAGNOSIS — K22.2 ESOPHAGEAL STRICTURE: ICD-10-CM

## 2021-08-04 PROCEDURE — 99214 OFFICE O/P EST MOD 30 MIN: CPT | Performed by: PHYSICIAN ASSISTANT

## 2021-08-04 RX ORDER — FLUOXETINE HYDROCHLORIDE 20 MG/1
20 CAPSULE ORAL 2 TIMES DAILY
Qty: 180 CAPSULE | Refills: 0 | Status: SHIPPED | OUTPATIENT
Start: 2021-08-04 | End: 2021-08-19 | Stop reason: SDUPTHER

## 2021-08-04 RX ORDER — ACETAMINOPHEN,DIPHENHYDRAMINE HCL 500; 25 MG/1; MG/1
1 TABLET, FILM COATED ORAL NIGHTLY PRN
COMMUNITY

## 2021-08-05 LAB
25(OH)D3+25(OH)D2 SERPL-MCNC: 28.1 NG/ML (ref 30–100)
ALBUMIN SERPL-MCNC: NORMAL G/DL
ALP SERPL-CCNC: NORMAL U/L
ALT SERPL-CCNC: NORMAL U/L
AST SERPL-CCNC: NORMAL U/L
BASOPHILS # BLD AUTO: 0.08 10*3/MM3 (ref 0–0.2)
BASOPHILS NFR BLD AUTO: 1.2 % (ref 0–1.5)
BILIRUB SERPL-MCNC: NORMAL MG/DL
BUN SERPL-MCNC: NORMAL MG/DL
CALCIUM SERPL-MCNC: NORMAL MG/DL
CHLORIDE SERPL-SCNC: NORMAL MMOL/L
CHOLEST SERPL-MCNC: 122 MG/DL (ref 0–200)
CK SERPL-CCNC: 66 U/L (ref 20–180)
CO2 SERPL-SCNC: NORMAL MMOL/L
CREAT SERPL-MCNC: NORMAL MG/DL
EOSINOPHIL # BLD AUTO: 0.19 10*3/MM3 (ref 0–0.4)
EOSINOPHIL NFR BLD AUTO: 2.9 % (ref 0.3–6.2)
ERYTHROCYTE [DISTWIDTH] IN BLOOD BY AUTOMATED COUNT: 12.5 % (ref 12.3–15.4)
FOLATE SERPL-MCNC: 11.9 NG/ML (ref 4.78–24.2)
GLUCOSE SERPL-MCNC: NORMAL MG/DL
HCT VFR BLD AUTO: 48 % (ref 34–46.6)
HDLC SERPL-MCNC: 43 MG/DL (ref 40–60)
HGB BLD-MCNC: 15.7 G/DL (ref 12–15.9)
IMM GRANULOCYTES # BLD AUTO: 0.03 10*3/MM3 (ref 0–0.05)
IMM GRANULOCYTES NFR BLD AUTO: 0.5 % (ref 0–0.5)
LDLC SERPL CALC-MCNC: 67 MG/DL (ref 0–100)
LDLC/HDLC SERPL: 1.59 {RATIO}
LYMPHOCYTES # BLD AUTO: 2.06 10*3/MM3 (ref 0.7–3.1)
LYMPHOCYTES NFR BLD AUTO: 31.3 % (ref 19.6–45.3)
MCH RBC QN AUTO: 30.1 PG (ref 26.6–33)
MCHC RBC AUTO-ENTMCNC: 32.7 G/DL (ref 31.5–35.7)
MCV RBC AUTO: 92 FL (ref 79–97)
MONOCYTES # BLD AUTO: 0.5 10*3/MM3 (ref 0.1–0.9)
MONOCYTES NFR BLD AUTO: 7.6 % (ref 5–12)
NEUTROPHILS # BLD AUTO: 3.72 10*3/MM3 (ref 1.7–7)
NEUTROPHILS NFR BLD AUTO: 56.5 % (ref 42.7–76)
NRBC BLD AUTO-RTO: 0 /100 WBC (ref 0–0.2)
PLATELET # BLD AUTO: 173 10*3/MM3 (ref 140–450)
POTASSIUM SERPL-SCNC: NORMAL MMOL/L
PROT SERPL-MCNC: NORMAL G/DL
RBC # BLD AUTO: 5.22 10*6/MM3 (ref 3.77–5.28)
REQUEST PROBLEM: NORMAL
SODIUM SERPL-SCNC: NORMAL MMOL/L
TRIGL SERPL-MCNC: 54 MG/DL (ref 0–150)
TSH SERPL DL<=0.005 MIU/L-ACNC: 4.24 UIU/ML (ref 0.27–4.2)
VIT B12 SERPL-MCNC: 392 PG/ML (ref 211–946)
VLDLC SERPL CALC-MCNC: 12 MG/DL (ref 5–40)
WBC # BLD AUTO: 6.58 10*3/MM3 (ref 3.4–10.8)

## 2021-08-06 LAB
25(OH)D3+25(OH)D2 SERPL-MCNC: 25.2 NG/ML (ref 30–100)
ALBUMIN SERPL-MCNC: 4.3 G/DL (ref 3.8–4.8)
ALBUMIN/GLOB SERPL: 1.5 {RATIO} (ref 1.2–2.2)
ALP SERPL-CCNC: 98 IU/L (ref 48–121)
ALT SERPL-CCNC: 23 IU/L (ref 0–32)
AST SERPL-CCNC: 20 IU/L (ref 0–40)
BILIRUB SERPL-MCNC: 0.8 MG/DL (ref 0–1.2)
BUN SERPL-MCNC: 14 MG/DL (ref 8–27)
BUN/CREAT SERPL: 13 (ref 12–28)
CALCIUM SERPL-MCNC: 9.7 MG/DL (ref 8.7–10.3)
CHLORIDE SERPL-SCNC: 103 MMOL/L (ref 96–106)
CHOLEST SERPL-MCNC: 185 MG/DL (ref 100–199)
CK SERPL-CCNC: 80 U/L (ref 32–182)
CO2 SERPL-SCNC: 22 MMOL/L (ref 20–29)
CREAT SERPL-MCNC: 1.09 MG/DL (ref 0.57–1)
FOLATE SERPL-MCNC: 5.3 NG/ML
GLOBULIN SER CALC-MCNC: 2.9 G/DL (ref 1.5–4.5)
GLUCOSE SERPL-MCNC: 82 MG/DL (ref 65–99)
HDLC SERPL-MCNC: 49 MG/DL
LDLC SERPL CALC-MCNC: 107 MG/DL (ref 0–99)
LDLC/HDLC SERPL: 2.2 RATIO (ref 0–3.2)
POTASSIUM SERPL-SCNC: 4.6 MMOL/L (ref 3.5–5.2)
PROT SERPL-MCNC: 7.2 G/DL (ref 6–8.5)
SODIUM SERPL-SCNC: 141 MMOL/L (ref 134–144)
TRIGL SERPL-MCNC: 169 MG/DL (ref 0–149)
TSH SERPL DL<=0.005 MIU/L-ACNC: 0.76 UIU/ML (ref 0.45–4.5)
VIT B12 SERPL-MCNC: 790 PG/ML (ref 232–1245)
VLDLC SERPL CALC-MCNC: 29 MG/DL (ref 5–40)

## 2021-08-10 ENCOUNTER — HOSPITAL ENCOUNTER (OUTPATIENT)
Dept: CT IMAGING | Facility: HOSPITAL | Age: 66
Discharge: HOME | End: 2021-08-10
Admitting: PSYCHIATRY & NEUROLOGY

## 2021-08-10 ENCOUNTER — HOSPITAL ENCOUNTER (OUTPATIENT)
Dept: CT IMAGING | Facility: HOSPITAL | Age: 66
End: 2021-08-10

## 2021-08-10 DIAGNOSIS — R42 DIZZINESS: ICD-10-CM

## 2021-08-10 LAB — CREAT BLDA-MCNC: 1 MG/DL (ref 0.6–1.3)

## 2021-08-10 PROCEDURE — 82565 ASSAY OF CREATININE: CPT

## 2021-08-10 PROCEDURE — 0 IOPAMIDOL PER 1 ML: Performed by: PSYCHIATRY & NEUROLOGY

## 2021-08-10 PROCEDURE — 70498 CT ANGIOGRAPHY NECK: CPT

## 2021-08-10 PROCEDURE — 70496 CT ANGIOGRAPHY HEAD: CPT

## 2021-08-10 RX ADMIN — IOPAMIDOL 95 ML: 755 INJECTION, SOLUTION INTRAVENOUS at 10:31

## 2021-08-13 ENCOUNTER — OFFICE VISIT (OUTPATIENT)
Dept: NEUROLOGY | Facility: CLINIC | Age: 66
End: 2021-08-13

## 2021-08-13 VITALS
OXYGEN SATURATION: 96 % | HEART RATE: 94 BPM | WEIGHT: 214 LBS | HEIGHT: 63 IN | SYSTOLIC BLOOD PRESSURE: 150 MMHG | DIASTOLIC BLOOD PRESSURE: 92 MMHG | BODY MASS INDEX: 37.92 KG/M2

## 2021-08-13 DIAGNOSIS — G45.0 VERTEBROBASILAR INSUFFICIENCY: ICD-10-CM

## 2021-08-13 DIAGNOSIS — G47.33 OBSTRUCTIVE SLEEP APNEA: Primary | ICD-10-CM

## 2021-08-13 DIAGNOSIS — I72.0 CAROTID ANEURYSM, LEFT (HCC): ICD-10-CM

## 2021-08-13 PROCEDURE — 99214 OFFICE O/P EST MOD 30 MIN: CPT | Performed by: PSYCHIATRY & NEUROLOGY

## 2021-08-13 NOTE — PROGRESS NOTES
Subjective:     Patient ID: Virginia Leiva is a 66 y.o. female.    Ms. Leiva is a 66-year-old right-handed female with a history of anxiety, osteoarthritis, GERD, headaches, kidney stones, hyperlipidemia, and B12 deficiency who presents to the neurology clinic today as an established patient for follow-up for dizziness.  The patient was last seen on June 1 of 2021.  For details regarding her history, please refer to that note.  The patient reports that her symptoms seem to be more prominent on the left side now.  She had a CTA of her head and neck.  There was moderate degrees of stenosis involving limbs that constitute a proximal aspect of the left vertebral artery, moderate stenosis of the left MCA and a 2 to 3 mm outpouching arising from the left supraclinoid ICA.  The patient reports snoring and she reports that she stays tired.  She does not have a history of diabetes, hypertension, or tobacco use.  She does not drink alcohol.  She has been on atorvastatin for last few months.  She drinks about 2 cans of soda a day.  She does not consume much red meat.  She reports that her dizziness prevents her from exercising.    The following portions of the patient's history were reviewed and updated as appropriate: allergies, current medications, past family history, past medical history, past social history, past surgical history and problem list.    Review of Systems     Objective:    Neurologic Exam    Physical Exam    Assessment/Plan:    Ms. Leiva is a 66-year-old right-handed female with history of anxiety, osteoarthritis, GERD, headaches, kidney stones, hyperlipidemia, B12 deficiency who presents today for follow-up.    1. Dizziness-The patient's CTAs do show a possible component of vertebrobasilar insufficiency. She also has some intracranial cerebrovascular stenosis. I recommend increasing her aspirin to 325 mg. I will also send her to neurosurgery for possible arteriogram to evaluate this outpouching that is an  infundibulum versus aneurysm. She is also at risk for sleep apnea and therefore would like to do home sleep study. I will see the patient back after sleep study is completed.    A total of 30 minutes of time was spent on this encounter today. This includes reviewing the patient's records, face-to-face time, documentation.       Problems Addressed this Visit     None      Visit Diagnoses     Obstructive sleep apnea    -  Primary    Relevant Orders    Home Sleep Study    Vertebrobasilar insufficiency        Relevant Orders    Ambulatory Referral to Neurosurgery    Carotid aneurysm, left (CMS/[facility])        Relevant Orders    Ambulatory Referral to Neurosurgery      Diagnoses       Codes Comments    Obstructive sleep apnea    -  Primary ICD-10-CM: G47.33  ICD-9-CM: 327.23     Vertebrobasilar insufficiency     ICD-10-CM: G45.0  ICD-9-CM: 435.3     Carotid aneurysm, left (CMS/[facility])     ICD-10-CM: I72.0  ICD-9-CM: 442.81

## 2021-08-13 NOTE — PATIENT INSTRUCTIONS
Lower blood pressure by restricting salt in diet (less than 2400 mg/day), weight loss, increase fruits, vegetables, whole grains, and low-fat dairy products.    Consider the DASH diet or Mediterranean diet.  Increase fish and poultry intake.    Avoid sodas, sweets, and red meat.    Limit alcohol consumption.    Monitor and keep blood pressure, cholesterol and blood sugar at goal.    Avoid the use of tobacco and avoid secondhand smoke.    Engage in moderate to vigorous intensity aerobic exercise for about 40 minutes 3-4 times per week.  Consider lower intensity ramses chi or yoga if necessary.    Take antiplatelet medication regularly.  Increase aspirin to 325 mg daily.    If you snore, wake up unrefreshed or feel tired during the day, talk to your doctor as these may be signs of sleep apnea and a sleep study may be indicated.

## 2021-08-18 ENCOUNTER — TELEPHONE (OUTPATIENT)
Dept: FAMILY MEDICINE CLINIC | Facility: CLINIC | Age: 66
End: 2021-08-18

## 2021-08-18 NOTE — TELEPHONE ENCOUNTER
See result notes on this patient- she has 2 sets of results, 1 day apart that are very different. 1 resulted the 1 then saw the second.  I am not sure what happened with the lab and which result is correct.

## 2021-08-18 NOTE — TELEPHONE ENCOUNTER
----- Message from Maricarmen Gudino MA sent at 8/16/2021  6:17 AM EDT -----  Regarding: FW: Test Results Question  Contact: [phone]    ----- Message -----  From: Virginia Leiva  Sent: 8/13/2021   6:38 PM EDT  To: [facility]  Subject: Test Results Question                            I noticed my Vitamin D was low. Do l need to increase the dosage?    I also am being referred to a neurosurgeon by my neurologist. Maybe he will have answers.

## 2021-08-19 ENCOUNTER — TELEPHONE (OUTPATIENT)
Dept: FAMILY MEDICINE CLINIC | Facility: CLINIC | Age: 66
End: 2021-08-19

## 2021-08-19 DIAGNOSIS — E78.49 OTHER HYPERLIPIDEMIA: ICD-10-CM

## 2021-08-19 DIAGNOSIS — K22.2 ESOPHAGEAL STRICTURE: ICD-10-CM

## 2021-08-19 DIAGNOSIS — K21.9 GASTROESOPHAGEAL REFLUX DISEASE: ICD-10-CM

## 2021-08-19 RX ORDER — OMEPRAZOLE 20 MG/1
20 CAPSULE, DELAYED RELEASE ORAL DAILY
Qty: 90 CAPSULE | Refills: 0 | Status: SHIPPED | OUTPATIENT
Start: 2021-08-19 | End: 2021-10-19

## 2021-08-19 RX ORDER — FLUOXETINE HYDROCHLORIDE 20 MG/1
20 CAPSULE ORAL 2 TIMES DAILY
Qty: 180 CAPSULE | Refills: 0 | Status: SHIPPED | OUTPATIENT
Start: 2021-08-19 | End: 2021-12-23

## 2021-08-19 RX ORDER — ATORVASTATIN CALCIUM 10 MG/1
10 TABLET, FILM COATED ORAL NIGHTLY
Qty: 90 TABLET | Refills: 0 | Status: SHIPPED | OUTPATIENT
Start: 2021-08-19 | End: 2021-11-01

## 2021-08-20 NOTE — TELEPHONE ENCOUNTER
I do remember a tech that was here stated she did not get enough blood and wanted to redraw. It would appear they submitted both samples - the second day 8/5 she had already eaten.     How do you want to move forward with this? Sorry I just remembered after reading the patient note.

## 2021-08-20 NOTE — TELEPHONE ENCOUNTER
----- Message from Maricarmen Gudino MA sent at 8/19/2021  7:35 AM EDT -----  Regarding: FW: Test Results Question  Contact: [phone]    ----- Message -----  From: Virginia Leiva  Sent: 8/19/2021   7:25 AM EDT  To: [facility]  Subject: RE: Test Results Question                        I fasted on 8/4. They called me on 8/5 and said they needed them again so l went and had it done again after l ate.  ----- Message -----  From: RIP Scruggs  Sent: 8/18/21, 9:36 PM  To: Virginia Leiva  Subject: RE: Test Results Question    I would recommend following recommendations from neurology unless you have had significant GI bleeding. My lab note recommends not taking anti-inflammatory medications- this would be Aleve, Naproxen, Ibuprofen, Advil, Motrin, Voltaren, Diclofenac, Mobic, Meloxicam, Celebrex, and others.     Also, there was a discrepancy in your labs. Did you have fasting labs 8/4/2021 or 8/5/2021? I have our office looking into results that were 1 day apart and were very different.     Ok to take aspirin.     Thank you,     Kacey Hernadez PA-C      ----- Message -----       From:Virginia Leiva       Sent:8/18/2021 10:17 AM EDT         To:RIP Scruggs    Subject:Test Results Question    Ok my neurologist increased my aspirin to 325 from 81.  Your notes say not to take it. What is the right thing to do? I'm confused

## 2021-08-20 NOTE — TELEPHONE ENCOUNTER
Thank you-I am not sure what they did not have enough blood for, as it looks like we have all the results.  I am not sure what to do to ensure the patient has not charged twice since she was asked to come back.  The first results are fasting and are more accurate and the second results would be nonfasting and are significantly different.  As far as results, I am just going to recheck her at her follow-up.  It is wanted to make sure they did not submit both samples for payment.

## 2021-08-20 NOTE — TELEPHONE ENCOUNTER
See patient's response. I am not sure why she was called back and had labs redrawn. Please ensure she is not charged for 2 sets of labs. I would go by her 1st set of labs if she was fasting then. Take vitamin D 4000 IU daily.     I am still not sure how thyroid, b12, and other labs would vary that much in 24 hours. I will monitor- please ensure she schedules 4 month appt.

## 2021-08-23 NOTE — PROGRESS NOTES
Subjective   Patient ID: Virginia Leiva is a 66 y.o. female is being seen for consultation today at the request of Teetee Snowden for vertebrobasilar insufficiency.  CTA head/neck done on 8/10/21.  Today pt reports dizziness and headaches when she sleeps on her L side she will wake up with one.  She also has some tremors, tinnitus and fatigue    66-year-old right-handed female with a history of anxiety, osteoarthritis, GERD, headaches, kidney stones, hyperlipidemia, and B12 deficiency who presents for follow-up of dizziness and ataxia.  She has seen both ENT as well as the [facility] for her dizziness and ringing in the ears with work-up at both places showing no abnormalities.  The patient reports that her symptoms seem to be more prominent on the left side now.  She had a CTA of her head and neck.  There was moderate degrees of stenosis involving limbs that constitute a proximal aspect of the left vertebral artery, moderate stenosis of the left MCA and a 2 to 3 mm outpouching arising from the left supraclinoid ICA.  The patient reports snoring and she reports that she stays tired.  She does not have a history of diabetes, hypertension, or tobacco use.  She does not drink alcohol.  She has been on atorvastatin for last few months.  She drinks about 2 cans of soda a day.  She does not consume much red meat.  She reports that her dizziness prevents her from exercising.  She has not undergone conventional angiographic assessment.      The following portions of the patient's history were reviewed and updated as appropriate: allergies, current medications, past family history, past medical history, past social history, past surgical history and problem list.    Review of Systems   Constitutional: Positive for fatigue. Negative for chills.   HENT: Positive for tinnitus. Negative for ear pain.    Eyes: Negative for pain and visual disturbance.   Respiratory: Negative for cough and shortness of breath.     Cardiovascular: Negative for chest pain and palpitations.   Gastrointestinal: Negative for abdominal pain and nausea.   Genitourinary: Negative for difficulty urinating and enuresis.   Musculoskeletal: Positive for neck pain (occasionally).   Skin: Negative for rash.   Neurological: Positive for dizziness, tremors and headaches (positional when waking up). Negative for seizures, syncope, speech difficulty, light-headedness and numbness.   Psychiatric/Behavioral: Negative for confusion and decreased concentration.       Objective    Vitals:    08/31/21 1247   BP: 120/82   Pulse: 68   Resp: 16   Temp: 98.6 °F (37 °C)     Body mass index is 38.26 kg/m².    Physical Exam  Vitals and nursing note reviewed.   Constitutional:       General: She is not in acute distress.     Appearance: She is obese. She is not ill-appearing.   HENT:      Head: Normocephalic.      Nose: Nose normal.      Mouth/Throat:      Mouth: Mucous membranes are moist.   Eyes:      Extraocular Movements: Extraocular movements intact.      Conjunctiva/sclera: Conjunctivae normal.      Pupils: Pupils are equal, round, and reactive to light.   Cardiovascular:      Rate and Rhythm: Normal rate.      Pulses: Normal pulses.   Pulmonary:      Effort: Pulmonary effort is normal.   Musculoskeletal:      Cervical back: Normal range of motion.   Skin:     General: Skin is warm and dry.   Neurological:      General: No focal deficit present.      Mental Status: She is alert and oriented to person, place, and time.      Cranial Nerves: No cranial nerve deficit.      Sensory: No sensory deficit.      Motor: No weakness.      Coordination: Coordination normal.      Gait: Gait normal.   Psychiatric:         Mood and Affect: Mood normal.         Behavior: Behavior normal.         Thought Content: Thought content normal.         Judgment: Judgment normal.       Neurologic Exam     Mental Status   Oriented to person, place, and time.     Cranial Nerves     CN III, IV,  VI   Pupils are equal, round, and reactive to light.      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  The aneurysm appears to measure approximately 3 mm.  Medical Decision Makin-year-old female with an abnormal CTA of the head neck with and aneurysm suspected as well as a left vertebral artery stenosis.  Angiographic evaluation is recommended and the risks, alternatives and procedure were reviewed with the patient who wishes to proceed.  The procedure will be done under general anesthesia in order to perform endovascular treatment should be required.  Diagnoses and all orders for this visit:    1. Other hyperlipidemia (Primary)    2. Vertebrobasilar insufficiency    3. Dizziness    4. Ataxia      Return in about 1 week (around 2021) for Cerebral Angiogram (DSA) with possible vertebral artery stent, Recheck.

## 2021-08-26 ENCOUNTER — TELEPHONE (OUTPATIENT)
Dept: FAMILY MEDICINE CLINIC | Facility: CLINIC | Age: 66
End: 2021-08-26

## 2021-08-26 NOTE — TELEPHONE ENCOUNTER
I have sent a message to Bailey Alexander with [facility] in regards to correcting the billing on this patients labs that were done on 8/4 and 8/5.    Patient was requested to return to the office by the fill in  due to issues with the blood draw on the 4th. She stated it was hematized.     However the tech sent in both draws prompting two sets of results and two bills.    This has been discussed with the regular rep Kerri who works our office.

## 2021-08-30 ENCOUNTER — HOSPITAL ENCOUNTER (OUTPATIENT)
Dept: SLEEP MEDICINE | Facility: HOSPITAL | Age: 66
Discharge: HOME | End: 2021-08-30
Admitting: PSYCHIATRY & NEUROLOGY

## 2021-08-30 DIAGNOSIS — G47.33 OBSTRUCTIVE SLEEP APNEA: ICD-10-CM

## 2021-08-30 LAB
25(OH)D3+25(OH)D2 SERPL-MCNC: 35.8 NG/ML (ref 30–100)
ALBUMIN SERPL-MCNC: 4.5 G/DL (ref 3.5–5.2)
ALBUMIN/GLOB SERPL: 1.7 G/DL
ALP SERPL-CCNC: 99 U/L (ref 39–117)
ALT SERPL-CCNC: 31 U/L (ref 1–33)
AST SERPL-CCNC: 24 U/L (ref 1–32)
BASOPHILS # BLD AUTO: 0.07 10*3/MM3 (ref 0–0.2)
BASOPHILS NFR BLD AUTO: 1 % (ref 0–1.5)
BILIRUB SERPL-MCNC: 0.7 MG/DL (ref 0–1.2)
BUN SERPL-MCNC: 14 MG/DL (ref 8–23)
BUN/CREAT SERPL: 14.6 (ref 7–25)
CALCIUM SERPL-MCNC: 9.7 MG/DL (ref 8.6–10.5)
CHLORIDE SERPL-SCNC: 107 MMOL/L (ref 98–107)
CHOLEST SERPL-MCNC: 160 MG/DL (ref 0–200)
CK SERPL-CCNC: 73 U/L (ref 20–180)
CO2 SERPL-SCNC: 22.6 MMOL/L (ref 22–29)
CREAT SERPL-MCNC: 0.96 MG/DL (ref 0.57–1)
EOSINOPHIL # BLD AUTO: 0.14 10*3/MM3 (ref 0–0.4)
EOSINOPHIL NFR BLD AUTO: 2 % (ref 0.3–6.2)
ERYTHROCYTE [DISTWIDTH] IN BLOOD BY AUTOMATED COUNT: 12.8 % (ref 12.3–15.4)
FOLATE SERPL-MCNC: 3.99 NG/ML (ref 4.78–24.2)
GLOBULIN SER CALC-MCNC: 2.6 GM/DL
GLUCOSE SERPL-MCNC: 106 MG/DL (ref 65–99)
HCT VFR BLD AUTO: 42.7 % (ref 34–46.6)
HDLC SERPL-MCNC: 46 MG/DL (ref 40–60)
HGB BLD-MCNC: 14.1 G/DL (ref 12–15.9)
IMM GRANULOCYTES # BLD AUTO: 0.03 10*3/MM3 (ref 0–0.05)
IMM GRANULOCYTES NFR BLD AUTO: 0.4 % (ref 0–0.5)
LDLC SERPL CALC-MCNC: 94 MG/DL (ref 0–100)
LDLC/HDLC SERPL: 2 {RATIO}
LYMPHOCYTES # BLD AUTO: 2.23 10*3/MM3 (ref 0.7–3.1)
LYMPHOCYTES NFR BLD AUTO: 31.6 % (ref 19.6–45.3)
MCH RBC QN AUTO: 29.8 PG (ref 26.6–33)
MCHC RBC AUTO-ENTMCNC: 33 G/DL (ref 31.5–35.7)
MCV RBC AUTO: 90.3 FL (ref 79–97)
MONOCYTES # BLD AUTO: 0.39 10*3/MM3 (ref 0.1–0.9)
MONOCYTES NFR BLD AUTO: 5.5 % (ref 5–12)
NEUTROPHILS # BLD AUTO: 4.2 10*3/MM3 (ref 1.7–7)
NEUTROPHILS NFR BLD AUTO: 59.5 % (ref 42.7–76)
NRBC BLD AUTO-RTO: 0 /100 WBC (ref 0–0.2)
PLATELET # BLD AUTO: 192 10*3/MM3 (ref 140–450)
POTASSIUM SERPL-SCNC: 4.5 MMOL/L (ref 3.5–5.2)
PROT SERPL-MCNC: 7.1 G/DL (ref 6–8.5)
RBC # BLD AUTO: 4.73 10*6/MM3 (ref 3.77–5.28)
SODIUM SERPL-SCNC: 142 MMOL/L (ref 136–145)
TRIGL SERPL-MCNC: 111 MG/DL (ref 0–150)
TSH SERPL DL<=0.005 MIU/L-ACNC: 0.68 UIU/ML (ref 0.27–4.2)
VIT B12 SERPL-MCNC: 1245 PG/ML (ref 211–946)
VLDLC SERPL CALC-MCNC: 20 MG/DL (ref 5–40)
WBC # BLD AUTO: 7.06 10*3/MM3 (ref 3.4–10.8)

## 2021-08-30 PROCEDURE — 95806 SLEEP STUDY UNATT&RESP EFFT: CPT | Performed by: PSYCHIATRY & NEUROLOGY

## 2021-08-30 PROCEDURE — 95806 SLEEP STUDY UNATT&RESP EFFT: CPT

## 2021-08-31 ENCOUNTER — OFFICE VISIT (OUTPATIENT)
Dept: NEUROSURGERY | Facility: CLINIC | Age: 66
End: 2021-08-31

## 2021-08-31 VITALS
BODY MASS INDEX: 38.27 KG/M2 | RESPIRATION RATE: 16 BRPM | TEMPERATURE: 98.6 F | SYSTOLIC BLOOD PRESSURE: 120 MMHG | WEIGHT: 216 LBS | HEART RATE: 68 BPM | HEIGHT: 63 IN | DIASTOLIC BLOOD PRESSURE: 82 MMHG

## 2021-08-31 DIAGNOSIS — R42 DIZZINESS: ICD-10-CM

## 2021-08-31 DIAGNOSIS — I67.0 DISSECTION OF CEREBRAL ARTERIES, NONRUPTURED (HCC): ICD-10-CM

## 2021-08-31 DIAGNOSIS — R27.0 ATAXIA: ICD-10-CM

## 2021-08-31 DIAGNOSIS — G45.0 VERTEBROBASILAR INSUFFICIENCY: ICD-10-CM

## 2021-08-31 DIAGNOSIS — E78.49 OTHER HYPERLIPIDEMIA: Primary | ICD-10-CM

## 2021-08-31 PROCEDURE — 99203 OFFICE O/P NEW LOW 30 MIN: CPT | Performed by: RADIOLOGY

## 2021-08-31 RX ORDER — ASPIRIN 325 MG
325 TABLET ORAL DAILY
COMMUNITY
End: 2021-10-06

## 2021-09-04 RX ORDER — MELATONIN
COMMUNITY
Start: 2021-09-01

## 2021-09-15 ENCOUNTER — TRANSCRIBE ORDERS (OUTPATIENT)
Dept: GENERAL RADIOLOGY | Facility: HOSPITAL | Age: 66
End: 2021-09-15

## 2021-09-15 DIAGNOSIS — Z20.822 ENCOUNTER FOR PREPROCEDURE SCREENING LABORATORY TESTING FOR COVID-19: Primary | ICD-10-CM

## 2021-09-15 DIAGNOSIS — G45.0 VERTEBROBASILAR INSUFFICIENCY: Primary | ICD-10-CM

## 2021-09-15 DIAGNOSIS — Z01.812 ENCOUNTER FOR PREPROCEDURE SCREENING LABORATORY TESTING FOR COVID-19: Primary | ICD-10-CM

## 2021-09-28 ENCOUNTER — TELEPHONE (OUTPATIENT)
Dept: NEUROSURGERY | Facility: CLINIC | Age: 66
End: 2021-09-28

## 2021-09-28 NOTE — TELEPHONE ENCOUNTER
I could not respond to the note you sent me but yes she needs to start taking the plavix October 1st at the latest as it needs to be 5 days prior to her October 5th pre-op testing. Patient is aware of this. Thanks.

## 2021-09-28 NOTE — TELEPHONE ENCOUNTER
----- Message from Rissa Becerra MA sent at 9/27/2021  4:17 PM EDT -----  Regarding: FW: Prescription Question  Contact: [phone]  This pt is having a c-angio on 10/6/21.   Does she need to start Plavix on 10/1/21?  ----- Message -----  From: Virginia Leiva  Sent: 9/27/2021   3:43 PM EDT  To: [facility]  Subject: Prescription Question                            I need to start taking Plavix on October 1 - when will you send my prescription to [facility] in [place]?

## 2021-09-29 RX ORDER — CLOPIDOGREL BISULFATE 75 MG/1
75 TABLET ORAL DAILY
Qty: 30 TABLET | Refills: 3 | Status: SHIPPED | OUTPATIENT
Start: 2021-09-29 | End: 2021-10-26 | Stop reason: ALTCHOICE

## 2021-10-05 ENCOUNTER — ANESTHESIA EVENT (OUTPATIENT)
Dept: INTERVENTIONAL RADIOLOGY/VASCULAR | Facility: HOSPITAL | Age: 66
End: 2021-10-05

## 2021-10-05 ENCOUNTER — LAB (OUTPATIENT)
Dept: LAB | Facility: HOSPITAL | Age: 66
End: 2021-10-05

## 2021-10-05 DIAGNOSIS — Z01.812 ENCOUNTER FOR PREPROCEDURE SCREENING LABORATORY TESTING FOR COVID-19: ICD-10-CM

## 2021-10-05 DIAGNOSIS — G45.0 VERTEBROBASILAR INSUFFICIENCY: ICD-10-CM

## 2021-10-05 DIAGNOSIS — Z20.822 ENCOUNTER FOR PREPROCEDURE SCREENING LABORATORY TESTING FOR COVID-19: ICD-10-CM

## 2021-10-05 LAB
ANION GAP SERPL CALCULATED.3IONS-SCNC: 11.9 MMOL/L (ref 5–15)
BASOPHILS # BLD AUTO: 0.06 10*3/MM3 (ref 0–0.2)
BASOPHILS NFR BLD AUTO: 0.9 % (ref 0–1.5)
BUN SERPL-MCNC: 15 MG/DL (ref 8–23)
BUN/CREAT SERPL: 15 (ref 7–25)
CALCIUM SPEC-SCNC: 9.5 MG/DL (ref 8.6–10.5)
CHLORIDE SERPL-SCNC: 109 MMOL/L (ref 98–107)
CO2 SERPL-SCNC: 24.1 MMOL/L (ref 22–29)
CREAT SERPL-MCNC: 1 MG/DL (ref 0.57–1)
DEPRECATED RDW RBC AUTO: 45.9 FL (ref 37–54)
EOSINOPHIL # BLD AUTO: 0.2 10*3/MM3 (ref 0–0.4)
EOSINOPHIL NFR BLD AUTO: 2.8 % (ref 0.3–6.2)
ERYTHROCYTE [DISTWIDTH] IN BLOOD BY AUTOMATED COUNT: 13.3 % (ref 12.3–15.4)
GFR SERPL CREATININE-BSD FRML MDRD: 56 ML/MIN/1.73
GLUCOSE SERPL-MCNC: 105 MG/DL (ref 65–99)
HCT VFR BLD AUTO: 42.1 % (ref 34–46.6)
HGB BLD-MCNC: 13.8 G/DL (ref 12–15.9)
IMM GRANULOCYTES # BLD AUTO: 0.02 10*3/MM3 (ref 0–0.05)
IMM GRANULOCYTES NFR BLD AUTO: 0.3 % (ref 0–0.5)
LYMPHOCYTES # BLD AUTO: 2.44 10*3/MM3 (ref 0.7–3.1)
LYMPHOCYTES NFR BLD AUTO: 34.6 % (ref 19.6–45.3)
MCH RBC QN AUTO: 30.6 PG (ref 26.6–33)
MCHC RBC AUTO-ENTMCNC: 32.8 G/DL (ref 31.5–35.7)
MCV RBC AUTO: 93.3 FL (ref 79–97)
MONOCYTES # BLD AUTO: 0.5 10*3/MM3 (ref 0.1–0.9)
MONOCYTES NFR BLD AUTO: 7.1 % (ref 5–12)
NEUTROPHILS NFR BLD AUTO: 3.83 10*3/MM3 (ref 1.7–7)
NEUTROPHILS NFR BLD AUTO: 54.3 % (ref 42.7–76)
NRBC BLD AUTO-RTO: 0 /100 WBC (ref 0–0.2)
PLATELET # BLD AUTO: 196 10*3/MM3 (ref 140–450)
PMV BLD AUTO: 12.2 FL (ref 6–12)
POTASSIUM SERPL-SCNC: 4.4 MMOL/L (ref 3.5–5.2)
RBC # BLD AUTO: 4.51 10*6/MM3 (ref 3.77–5.28)
SARS-COV-2 ORF1AB RESP QL NAA+PROBE: NOT DETECTED
SODIUM SERPL-SCNC: 145 MMOL/L (ref 136–145)
WBC # BLD AUTO: 7.05 10*3/MM3 (ref 3.4–10.8)

## 2021-10-05 PROCEDURE — U0004 COV-19 TEST NON-CDC HGH THRU: HCPCS

## 2021-10-05 PROCEDURE — 85025 COMPLETE CBC W/AUTO DIFF WBC: CPT

## 2021-10-05 PROCEDURE — 80048 BASIC METABOLIC PNL TOTAL CA: CPT

## 2021-10-05 PROCEDURE — U0005 INFEC AGEN DETEC AMPLI PROBE: HCPCS

## 2021-10-05 PROCEDURE — 36415 COLL VENOUS BLD VENIPUNCTURE: CPT

## 2021-10-05 PROCEDURE — C9803 HOPD COVID-19 SPEC COLLECT: HCPCS

## 2021-10-06 ENCOUNTER — ANESTHESIA (OUTPATIENT)
Dept: INTERVENTIONAL RADIOLOGY/VASCULAR | Facility: HOSPITAL | Age: 66
End: 2021-10-06

## 2021-10-06 ENCOUNTER — HOSPITAL ENCOUNTER (OUTPATIENT)
Dept: INTERVENTIONAL RADIOLOGY/VASCULAR | Facility: HOSPITAL | Age: 66
Discharge: HOME | End: 2021-10-06

## 2021-10-06 ENCOUNTER — HOSPITAL ENCOUNTER (OUTPATIENT)
Dept: INTERVENTIONAL RADIOLOGY/VASCULAR | Facility: HOSPITAL | Age: 66
End: 2021-10-06

## 2021-10-06 VITALS
DIASTOLIC BLOOD PRESSURE: 75 MMHG | HEIGHT: 63 IN | WEIGHT: 220 LBS | BODY MASS INDEX: 38.98 KG/M2 | RESPIRATION RATE: 20 BRPM | SYSTOLIC BLOOD PRESSURE: 128 MMHG | OXYGEN SATURATION: 94 % | HEART RATE: 64 BPM

## 2021-10-06 DIAGNOSIS — G45.0 VERTEBROBASILAR INSUFFICIENCY: ICD-10-CM

## 2021-10-06 DIAGNOSIS — I67.0 DISSECTION OF CEREBRAL ARTERIES, NONRUPTURED (HCC): ICD-10-CM

## 2021-10-06 DIAGNOSIS — R27.0 ATAXIA: ICD-10-CM

## 2021-10-06 DIAGNOSIS — R42 DIZZINESS: ICD-10-CM

## 2021-10-06 LAB
GLUCOSE BLDC GLUCOMTR-MCNC: 110 MG/DL (ref 70–130)
PA ADP PRP-ACNC: 252 PRU (ref 194–418)

## 2021-10-06 PROCEDURE — C1769 GUIDE WIRE: HCPCS

## 2021-10-06 PROCEDURE — 36224 PLACE CATH CAROTD ART: CPT | Performed by: RADIOLOGY

## 2021-10-06 PROCEDURE — 25010000002 DEXAMETHASONE PER 1 MG: Performed by: ANESTHESIOLOGY

## 2021-10-06 PROCEDURE — 85347 COAGULATION TIME ACTIVATED: CPT

## 2021-10-06 PROCEDURE — 25010000002 PHENYLEPHRINE 10 MG/ML SOLUTION: Performed by: ANESTHESIOLOGY

## 2021-10-06 PROCEDURE — 25010000002 FENTANYL CITRATE (PF) 50 MCG/ML SOLUTION: Performed by: ANESTHESIOLOGY

## 2021-10-06 PROCEDURE — 25010000002 NEOSTIGMINE 10 MG/10ML SOLUTION: Performed by: ANESTHESIOLOGY

## 2021-10-06 PROCEDURE — C1760 CLOSURE DEV, VASC: HCPCS

## 2021-10-06 PROCEDURE — 85576 BLOOD PLATELET AGGREGATION: CPT | Performed by: RADIOLOGY

## 2021-10-06 PROCEDURE — C1894 INTRO/SHEATH, NON-LASER: HCPCS

## 2021-10-06 PROCEDURE — 25010000003 LIDOCAINE 1 % SOLUTION: Performed by: ANESTHESIOLOGY

## 2021-10-06 PROCEDURE — 25010000002 PROPOFOL 10 MG/ML EMULSION: Performed by: ANESTHESIOLOGY

## 2021-10-06 PROCEDURE — 25010000002 ONDANSETRON PER 1 MG: Performed by: ANESTHESIOLOGY

## 2021-10-06 PROCEDURE — 94799 UNLISTED PULMONARY SVC/PX: CPT

## 2021-10-06 PROCEDURE — 0 IODIXANOL PER 1 ML: Performed by: RADIOLOGY

## 2021-10-06 PROCEDURE — 36226 PLACE CATH VERTEBRAL ART: CPT | Performed by: RADIOLOGY

## 2021-10-06 PROCEDURE — 25010000002 MIDAZOLAM PER 1 MG: Performed by: ANESTHESIOLOGY

## 2021-10-06 PROCEDURE — 82962 GLUCOSE BLOOD TEST: CPT

## 2021-10-06 PROCEDURE — 76377 3D RENDER W/INTRP POSTPROCES: CPT | Performed by: RADIOLOGY

## 2021-10-06 RX ORDER — IODIXANOL 320 MG/ML
300 INJECTION, SOLUTION INTRAVASCULAR
Status: COMPLETED | OUTPATIENT
Start: 2021-10-06 | End: 2021-10-06

## 2021-10-06 RX ORDER — FENTANYL CITRATE 50 UG/ML
50 INJECTION, SOLUTION INTRAMUSCULAR; INTRAVENOUS
Status: DISCONTINUED | OUTPATIENT
Start: 2021-10-06 | End: 2021-10-07 | Stop reason: HOSPADM

## 2021-10-06 RX ORDER — ENALAPRILAT 2.5 MG/2ML
1.25 INJECTION INTRAVENOUS ONCE AS NEEDED
Status: DISCONTINUED | OUTPATIENT
Start: 2021-10-06 | End: 2021-10-07 | Stop reason: HOSPADM

## 2021-10-06 RX ORDER — NALOXONE HCL 0.4 MG/ML
0.4 VIAL (ML) INJECTION AS NEEDED
Status: DISCONTINUED | OUTPATIENT
Start: 2021-10-06 | End: 2021-10-07 | Stop reason: HOSPADM

## 2021-10-06 RX ORDER — ACETAMINOPHEN 325 MG/1
650 TABLET ORAL EVERY 4 HOURS PRN
Status: DISCONTINUED | OUTPATIENT
Start: 2021-10-06 | End: 2021-10-07 | Stop reason: HOSPADM

## 2021-10-06 RX ORDER — LIDOCAINE HYDROCHLORIDE 20 MG/ML
INJECTION, SOLUTION INFILTRATION; PERINEURAL AS NEEDED
Status: DISCONTINUED | OUTPATIENT
Start: 2021-10-06 | End: 2021-10-06 | Stop reason: SURG

## 2021-10-06 RX ORDER — PHENYLEPHRINE HYDROCHLORIDE 10 MG/ML
INJECTION INTRAVENOUS AS NEEDED
Status: DISCONTINUED | OUTPATIENT
Start: 2021-10-06 | End: 2021-10-06 | Stop reason: SURG

## 2021-10-06 RX ORDER — PROPOFOL 10 MG/ML
VIAL (ML) INTRAVENOUS AS NEEDED
Status: DISCONTINUED | OUTPATIENT
Start: 2021-10-06 | End: 2021-10-06 | Stop reason: SURG

## 2021-10-06 RX ORDER — MIDAZOLAM HYDROCHLORIDE 1 MG/ML
0.5 INJECTION INTRAMUSCULAR; INTRAVENOUS
Status: DISCONTINUED | OUTPATIENT
Start: 2021-10-06 | End: 2021-10-07 | Stop reason: HOSPADM

## 2021-10-06 RX ORDER — GLYCOPYRROLATE 0.2 MG/ML
INJECTION INTRAMUSCULAR; INTRAVENOUS AS NEEDED
Status: DISCONTINUED | OUTPATIENT
Start: 2021-10-06 | End: 2021-10-06 | Stop reason: SURG

## 2021-10-06 RX ORDER — METOPROLOL TARTRATE 5 MG/5ML
INJECTION INTRAVENOUS AS NEEDED
Status: DISCONTINUED | OUTPATIENT
Start: 2021-10-06 | End: 2021-10-06 | Stop reason: SURG

## 2021-10-06 RX ORDER — ONDANSETRON 2 MG/ML
4 INJECTION INTRAMUSCULAR; INTRAVENOUS ONCE AS NEEDED
Status: DISCONTINUED | OUTPATIENT
Start: 2021-10-06 | End: 2021-10-07 | Stop reason: HOSPADM

## 2021-10-06 RX ORDER — ASPIRIN 81 MG/1
81 TABLET, CHEWABLE ORAL DAILY
COMMUNITY
End: 2021-10-26 | Stop reason: ALTCHOICE

## 2021-10-06 RX ORDER — FENTANYL CITRATE 50 UG/ML
INJECTION, SOLUTION INTRAMUSCULAR; INTRAVENOUS AS NEEDED
Status: DISCONTINUED | OUTPATIENT
Start: 2021-10-06 | End: 2021-10-06 | Stop reason: SURG

## 2021-10-06 RX ORDER — DIPHENHYDRAMINE HYDROCHLORIDE 50 MG/ML
12.5 INJECTION INTRAMUSCULAR; INTRAVENOUS
Status: DISCONTINUED | OUTPATIENT
Start: 2021-10-06 | End: 2021-10-07 | Stop reason: HOSPADM

## 2021-10-06 RX ORDER — NEOSTIGMINE METHYLSULFATE 1 MG/ML
INJECTION, SOLUTION INTRAVENOUS AS NEEDED
Status: DISCONTINUED | OUTPATIENT
Start: 2021-10-06 | End: 2021-10-06 | Stop reason: SURG

## 2021-10-06 RX ORDER — LIDOCAINE HYDROCHLORIDE 10 MG/ML
INJECTION, SOLUTION INFILTRATION; PERINEURAL
Status: COMPLETED | OUTPATIENT
Start: 2021-10-06 | End: 2021-10-06

## 2021-10-06 RX ORDER — FAMOTIDINE 10 MG/ML
20 INJECTION, SOLUTION INTRAVENOUS ONCE
Status: COMPLETED | OUTPATIENT
Start: 2021-10-06 | End: 2021-10-06

## 2021-10-06 RX ORDER — HYDROCODONE BITARTRATE AND ACETAMINOPHEN 5; 325 MG/1; MG/1
1 TABLET ORAL ONCE AS NEEDED
Status: DISCONTINUED | OUTPATIENT
Start: 2021-10-06 | End: 2021-10-07 | Stop reason: HOSPADM

## 2021-10-06 RX ORDER — ONDANSETRON 2 MG/ML
INJECTION INTRAMUSCULAR; INTRAVENOUS AS NEEDED
Status: DISCONTINUED | OUTPATIENT
Start: 2021-10-06 | End: 2021-10-06 | Stop reason: SURG

## 2021-10-06 RX ORDER — ONDANSETRON 2 MG/ML
4 INJECTION INTRAMUSCULAR; INTRAVENOUS EVERY 6 HOURS PRN
Status: DISCONTINUED | OUTPATIENT
Start: 2021-10-06 | End: 2021-10-07 | Stop reason: HOSPADM

## 2021-10-06 RX ORDER — SODIUM CHLORIDE 0.9 % (FLUSH) 0.9 %
3-10 SYRINGE (ML) INJECTION AS NEEDED
Status: DISCONTINUED | OUTPATIENT
Start: 2021-10-06 | End: 2021-10-07 | Stop reason: HOSPADM

## 2021-10-06 RX ORDER — SODIUM CHLORIDE, SODIUM LACTATE, POTASSIUM CHLORIDE, CALCIUM CHLORIDE 600; 310; 30; 20 MG/100ML; MG/100ML; MG/100ML; MG/100ML
9 INJECTION, SOLUTION INTRAVENOUS CONTINUOUS
Status: DISCONTINUED | OUTPATIENT
Start: 2021-10-06 | End: 2021-10-07 | Stop reason: HOSPADM

## 2021-10-06 RX ORDER — FENTANYL CITRATE 50 UG/ML
25 INJECTION, SOLUTION INTRAMUSCULAR; INTRAVENOUS
Status: DISCONTINUED | OUTPATIENT
Start: 2021-10-06 | End: 2021-10-07 | Stop reason: HOSPADM

## 2021-10-06 RX ORDER — LIDOCAINE HYDROCHLORIDE 10 MG/ML
0.5 INJECTION, SOLUTION EPIDURAL; INFILTRATION; INTRACAUDAL; PERINEURAL ONCE AS NEEDED
Status: DISCONTINUED | OUTPATIENT
Start: 2021-10-06 | End: 2021-10-07 | Stop reason: HOSPADM

## 2021-10-06 RX ORDER — ROCURONIUM BROMIDE 10 MG/ML
INJECTION, SOLUTION INTRAVENOUS AS NEEDED
Status: DISCONTINUED | OUTPATIENT
Start: 2021-10-06 | End: 2021-10-06 | Stop reason: SURG

## 2021-10-06 RX ORDER — SODIUM CHLORIDE 0.9 % (FLUSH) 0.9 %
3 SYRINGE (ML) INJECTION EVERY 12 HOURS SCHEDULED
Status: DISCONTINUED | OUTPATIENT
Start: 2021-10-06 | End: 2021-10-07 | Stop reason: HOSPADM

## 2021-10-06 RX ORDER — MIDAZOLAM HYDROCHLORIDE 1 MG/ML
INJECTION INTRAMUSCULAR; INTRAVENOUS
Status: COMPLETED | OUTPATIENT
Start: 2021-10-06 | End: 2021-10-06

## 2021-10-06 RX ORDER — OXYCODONE AND ACETAMINOPHEN 7.5; 325 MG/1; MG/1
1 TABLET ORAL ONCE AS NEEDED
Status: DISCONTINUED | OUTPATIENT
Start: 2021-10-06 | End: 2021-10-07 | Stop reason: HOSPADM

## 2021-10-06 RX ORDER — MIDAZOLAM HYDROCHLORIDE 1 MG/ML
1 INJECTION INTRAMUSCULAR; INTRAVENOUS
Status: DISCONTINUED | OUTPATIENT
Start: 2021-10-06 | End: 2021-10-07 | Stop reason: HOSPADM

## 2021-10-06 RX ORDER — DROPERIDOL 2.5 MG/ML
0.62 INJECTION, SOLUTION INTRAMUSCULAR; INTRAVENOUS ONCE AS NEEDED
Status: DISCONTINUED | OUTPATIENT
Start: 2021-10-06 | End: 2021-10-07 | Stop reason: HOSPADM

## 2021-10-06 RX ORDER — SODIUM CHLORIDE 9 MG/ML
75 INJECTION, SOLUTION INTRAVENOUS CONTINUOUS
Status: ACTIVE | OUTPATIENT
Start: 2021-10-06 | End: 2021-10-06

## 2021-10-06 RX ORDER — SODIUM CHLORIDE 0.9 % (FLUSH) 0.9 %
10 SYRINGE (ML) INJECTION AS NEEDED
Status: DISCONTINUED | OUTPATIENT
Start: 2021-10-06 | End: 2021-10-07 | Stop reason: HOSPADM

## 2021-10-06 RX ORDER — FENTANYL CITRATE 50 UG/ML
INJECTION, SOLUTION INTRAMUSCULAR; INTRAVENOUS
Status: COMPLETED | OUTPATIENT
Start: 2021-10-06 | End: 2021-10-06

## 2021-10-06 RX ORDER — DEXAMETHASONE SODIUM PHOSPHATE 10 MG/ML
INJECTION INTRAMUSCULAR; INTRAVENOUS AS NEEDED
Status: DISCONTINUED | OUTPATIENT
Start: 2021-10-06 | End: 2021-10-06 | Stop reason: SURG

## 2021-10-06 RX ADMIN — MIDAZOLAM 1 MG: 1 INJECTION INTRAMUSCULAR; INTRAVENOUS at 09:00

## 2021-10-06 RX ADMIN — GLYCOPYRROLATE 0.6 MG: 0.2 INJECTION INTRAMUSCULAR; INTRAVENOUS at 10:59

## 2021-10-06 RX ADMIN — NEOSTIGMINE METHYLSULFATE 3.5 MG: 1 INJECTION INTRAVENOUS at 10:59

## 2021-10-06 RX ADMIN — FENTANYL CITRATE 25 MCG: 0.05 INJECTION, SOLUTION INTRAMUSCULAR; INTRAVENOUS at 11:14

## 2021-10-06 RX ADMIN — PHENYLEPHRINE HYDROCHLORIDE 100 MCG: 10 INJECTION INTRAVENOUS at 10:43

## 2021-10-06 RX ADMIN — GLYCOPYRROLATE 0.1 MG: 0.2 INJECTION INTRAMUSCULAR; INTRAVENOUS at 09:35

## 2021-10-06 RX ADMIN — LIDOCAINE HYDROCHLORIDE 80 MG: 20 INJECTION, SOLUTION INFILTRATION; PERINEURAL at 09:44

## 2021-10-06 RX ADMIN — ONDANSETRON 4 MG: 2 INJECTION INTRAMUSCULAR; INTRAVENOUS at 10:56

## 2021-10-06 RX ADMIN — FAMOTIDINE 20 MG: 10 INJECTION, SOLUTION INTRAVENOUS at 08:53

## 2021-10-06 RX ADMIN — FENTANYL CITRATE 25 MCG: 0.05 INJECTION, SOLUTION INTRAMUSCULAR; INTRAVENOUS at 09:31

## 2021-10-06 RX ADMIN — FENTANYL CITRATE 25 MCG: 0.05 INJECTION, SOLUTION INTRAMUSCULAR; INTRAVENOUS at 09:00

## 2021-10-06 RX ADMIN — Medication 3 ML: at 08:54

## 2021-10-06 RX ADMIN — FENTANYL CITRATE 25 MCG: 0.05 INJECTION, SOLUTION INTRAMUSCULAR; INTRAVENOUS at 10:36

## 2021-10-06 RX ADMIN — METOROPROLOL TARTRATE 2.5 MG: 5 INJECTION, SOLUTION INTRAVENOUS at 11:07

## 2021-10-06 RX ADMIN — SODIUM CHLORIDE, POTASSIUM CHLORIDE, SODIUM LACTATE AND CALCIUM CHLORIDE 9 ML/HR: 600; 310; 30; 20 INJECTION, SOLUTION INTRAVENOUS at 08:54

## 2021-10-06 RX ADMIN — IODIXANOL 212 ML: 320 INJECTION, SOLUTION INTRAVASCULAR at 11:09

## 2021-10-06 RX ADMIN — PHENYLEPHRINE HYDROCHLORIDE 100 MCG: 10 INJECTION INTRAVENOUS at 10:12

## 2021-10-06 RX ADMIN — PROPOFOL 50 MG: 10 INJECTION, EMULSION INTRAVENOUS at 10:36

## 2021-10-06 RX ADMIN — PHENYLEPHRINE HYDROCHLORIDE 100 MCG: 10 INJECTION INTRAVENOUS at 10:15

## 2021-10-06 RX ADMIN — LIDOCAINE HYDROCHLORIDE 1 ML: 10 INJECTION, SOLUTION INFILTRATION; PERINEURAL at 08:59

## 2021-10-06 RX ADMIN — FENTANYL CITRATE 25 MCG: 0.05 INJECTION, SOLUTION INTRAMUSCULAR; INTRAVENOUS at 11:01

## 2021-10-06 RX ADMIN — ROCURONIUM BROMIDE 10 MG: 50 INJECTION INTRAVENOUS at 10:36

## 2021-10-06 RX ADMIN — PROPOFOL 150 MG: 10 INJECTION, EMULSION INTRAVENOUS at 09:44

## 2021-10-06 RX ADMIN — DEXAMETHASONE SODIUM PHOSPHATE 6 MG: 10 INJECTION INTRAMUSCULAR; INTRAVENOUS at 10:14

## 2021-10-06 RX ADMIN — PHENYLEPHRINE HYDROCHLORIDE 100 MCG: 10 INJECTION INTRAVENOUS at 10:27

## 2021-10-06 RX ADMIN — ROCURONIUM BROMIDE 50 MG: 50 INJECTION INTRAVENOUS at 09:44

## 2021-10-06 NOTE — POST-PROCEDURE NOTE
Pre-Op Dx: VBI/ Dizziness and Ataxia    Post-Op Dx: Same    Procedure: Cerebral DSA    Surgeon: Crow Quiñones MD    Findings: No vertebral artery stenosis or dissection. Dominant RVA with transient cross fill of Left PICA. LVA tortuous and small in caliber, but no stenosis or dissection. LVA supplies Left PICA as well. No stent or PTA needed. Official report to follow.    EBL: 10 cc    Complications: None encountered.    6F Angioseal placed R CFA puncture site.

## 2021-10-06 NOTE — DISCHARGE INSTRUCTIONS
General Anesthesia, Adult, Care After  This sheet gives you information about how to care for yourself after your procedure. Your health care provider may also give you more specific instructions. If you have problems or questions, contact your health care provider.  What can I expect after the procedure?  After the procedure, the following side effects are common:  · Pain or discomfort at the IV site.  · Nausea.  · Vomiting.  · Sore throat.  · Trouble concentrating.  · Feeling cold or chills.  · Feeling weak or tired.  · Sleepiness and fatigue.  · Soreness and body aches. These side effects can affect parts of the body that were not involved in surgery.  Follow these instructions at home:  For the time period you were told by your health care provider:    · Rest.  · Do not participate in activities where you could fall or become injured.  · Do not drive or use machinery.  · Do not drink alcohol.  · Do not take sleeping pills or medicines that cause drowsiness.  · Do not make important decisions or sign legal documents.  · Do not take care of children on your own.    Eating and drinking  · Follow any instructions from your health care provider about eating or drinking restrictions.  · When you feel hungry, start by eating small amounts of foods that are soft and easy to digest (bland), such as toast. Gradually return to your regular diet.  · Drink enough fluid to keep your urine pale yellow.  · If you vomit, rehydrate by drinking water, juice, or clear broth.  General instructions  · If you have sleep apnea, surgery and certain medicines can increase your risk for breathing problems. Follow instructions from your health care provider about wearing your sleep device:  ? Anytime you are sleeping, including during daytime naps.  ? While taking prescription pain medicines, sleeping medicines, or medicines that make you drowsy.  · Have a responsible adult stay with you for the time you are told. It is important to have  someone help care for you until you are awake and alert.  · Return to your normal activities as told by your health care provider. Ask your health care provider what activities are safe for you.  · Take over-the-counter and prescription medicines only as told by your health care provider.  · If you smoke, do not smoke without supervision.  · Keep all follow-up visits as told by your health care provider. This is important.  Contact a health care provider if:  · You have nausea or vomiting that does not get better with medicine.  · You cannot eat or drink without vomiting.  · You have pain that does not get better with medicine.  · You are unable to pass urine.  · You develop a skin rash.  · You have a fever.  · You have redness around your IV site that gets worse.  Get help right away if:  · You have difficulty breathing.  · You have chest pain.  · You have blood in your urine or stool, or you vomit blood.  Summary  · After the procedure, it is common to have a sore throat or nausea. It is also common to feel tired.  · Have a responsible adult stay with you for the time you are told. It is important to have someone help care for you until you are awake and alert.  · When you feel hungry, start by eating small amounts of foods that are soft and easy to digest (bland), such as toast. Gradually return to your regular diet.  · Drink enough fluid to keep your urine pale yellow.  · Return to your normal activities as told by your health care provider. Ask your health care provider what activities are safe for you.  This information is not intended to replace advice given to you by your health care provider. Make sure you discuss any questions you have with your health care provider.  Document Revised: 06/02/2021 Document Reviewed: 04/01/2021  Elsevier Patient Education © 2021 [employer]              Cerebral Angiogram, Care After  This sheet gives you information about how to care for yourself after your procedure.  Your health care provider may also give you more specific instructions. If you have problems or questions, contact your health care provider.  What can I expect after the procedure?  After the procedure, it is common to have:  · Bruising and tenderness at the catheter insertion site.  · A mild headache.  Follow these instructions at home:  Insertion site care  · Follow instructions from your health care provider about how to take care of the insertion site. Make sure you:  ? Wash your hands with soap and water before and after you change your bandage (dressing). If soap and water are not available, use hand .  ? Change your dressing as told by your health care provider.  · Do not take baths, swim, or use a hot tub until your health care provider approves. You may shower 24-48 hours after the procedure, or as told by your health care provider.  · To clean your insertion site:  ? Gently wash the site with plain soap and water.  ? Pat the area dry with a clean towel.  ? Do not rub the site. This may cause bleeding.  · Do not apply powder or lotion to the site. Keep the site clean and dry.  Infection signs  Check your incision area every day for signs of infection. Check for:  · Redness, swelling, or pain.  · Fluid or blood.  · Warmth.  · Pus or a bad smell.    Activity  · Do not drive for 24 hours if you were given a sedative during your procedure.  · Rest as told by your health care provider.  · Do not lift anything that is heavier than 10 lb (4.5 kg), or the limit that you are told, until your health care provider says that it is safe.  · Return to your normal activities as told by your health care provider, usually in about a week. Ask your health care provider what activities are safe for you.  General instructions    · If your insertion site starts to bleed, lie flat and put pressure on the site. If the bleeding does not stop, get help right away. This is a medical emergency.  · Do not use any products  "that contain nicotine or tobacco, such as cigarettes, e-cigarettes, and chewing tobacco. If you need help quitting, ask your health care provider.  · Take over-the-counter and prescription medicines only as told by your health care provider.  · Drink enough fluid to keep your urine pale yellow. This helps flush the contrast dye from your body.  · Keep all follow-up visits as directed by your health care provider. This is important.    Contact a health care provider if:  · You have a fever or chills.  · You have redness, swelling, or pain around your insertion site.  · You have fluid or blood coming from your insertion site.  · The insertion site feels warm to the touch.  · You have pus or a bad smell coming from your insertion site.  · You notice blood collecting in the tissue around the insertion site (hematoma). The hematoma may be painful to the touch.  Get help right away if:  · You have chest pain or trouble breathing.  · You have severe pain or swelling at the insertion site.  · The insertion area bleeds, and bleeding continues after 30 minutes of holding steady pressure on the site.  · The arm or leg where the catheter was inserted is pale, cold, numb, tingling, or weak.  · You have a rash.  · You have any symptoms of a stroke. \"BE FAST\" is an easy way to remember the main warning signs of a stroke:  ? B - Balance. Signs are dizziness, sudden trouble walking, or loss of balance.  ? E - Eyes. Signs are trouble seeing or a sudden change in vision.  ? F - Face. Signs are sudden weakness or numbness of the face, or the face or eyelid drooping on one side.  ? A - Arms. Signs are weakness or numbness in an arm. This happens suddenly and usually on one side of the body.  ? S - Speech. Signs are sudden trouble speaking, slurred speech, or trouble understanding what people say.  ? T - Time. Time to call emergency services. Write down what time symptoms started.  · You have other signs of a stroke, such as:  ? A " sudden, severe headache with no known cause.  ? Nausea or vomiting.  ? Seizure.  These symptoms may represent a serious problem that is an emergency. Do not wait to see if the symptoms will go away. Get medical help right away. Call your local emergency services (911 in the [place] Do not drive yourself to the hospital.  Summary  · Bruising and tenderness at the insertion site are common.  · Follow your health care provider's instructions about caring for your insertion site. Change dressing and clean the area as instructed.  · If your insertion site bleeds, apply direct pressure until bleeding stops.  · Return to your normal activities as told by your health care provider. Ask what activities are safe.  · Rest and drink plenty of fluids.  This information is not intended to replace advice given to you by your health care provider. Make sure you discuss any questions you have with your health care provider.  Document Revised: 07/07/2020 Document Reviewed: 07/07/2020  Elsevier Patient Education © 2021 Else[employer] Inc.

## 2021-10-06 NOTE — ANESTHESIA PREPROCEDURE EVALUATION
Anesthesia Evaluation     Patient summary reviewed and Nursing notes reviewed   NPO Solid Status: > 8 hours  NPO Liquid Status: > 2 hours           Airway   Mallampati: II  TM distance: >3 FB  Neck ROM: full  Dental - normal exam     Pulmonary - negative pulmonary ROS and normal exam    breath sounds clear to auscultation  Cardiovascular - normal exam    Rhythm: regular  Rate: normal    (+) hyperlipidemia,   (-) angina, orthopnea, PND, VASQUEZ      Neuro/Psych  (+) headaches, dizziness/light headedness (Vertebrobasilar insufficiency), psychiatric history Anxiety,     GI/Hepatic/Renal/Endo    (+) morbid obesity, GERD,  renal disease stones,     Musculoskeletal     Abdominal    Substance History - negative use     OB/GYN negative ob/gyn ROS         Other   arthritis,                    Anesthesia Plan    ASA 3     general   (A-line)  intravenous induction     Anesthetic plan, all risks, benefits, and alternatives have been provided, discussed and informed consent has been obtained with: patient.

## 2021-10-06 NOTE — ANESTHESIA PROCEDURE NOTES
Arterial Line      Patient reassessed immediately prior to procedure    Patient location during procedure: pre-op  Start time: 10/6/2021 8:55 AM  Stop Time:10/6/2021 9:15 AM       Line placed for ABGs/Labs/ISTAT, hemodynamic monitoring and MD/Surgeon request.  Performed By   Anesthesiologist: Enrique Josue MD  Preanesthetic Checklist  Completed: patient identified, IV checked, site marked, risks and benefits discussed, surgical consent, monitors and equipment checked, pre-op evaluation and timeout performed  Arterial Line Prep   Sterile Tech: cap and gloves  Prep: ChloraPrep  Patient monitoring: blood pressure monitoring, continuous pulse oximetry and EKG  Arterial Line Procedure   Location:  radial artery  Catheter size: 20 G   Guidance: ultrasound guided  PROCEDURE NOTE/ULTRASOUND INTERPRETATION.  Using ultrasound guidance the potential vascular sites for insertion of the catheter were visualized to determine the patency of the vessel to be used for vascular access.  After selecting the appropriate site for insertion, the needle was visualized under ultrasound being inserted into the radial artery, followed by ultrasound confirmation of wire and catheter placement. There were no abnormalities seen on ultrasound; an image was taken; and the patient tolerated the procedure with no complications.   Number of attempts: 2  Successful placement: yes  Post Assessment   Dressing Type: occlusive dressing applied, secured with tape and wrist guard applied.   Complications no  Circ/Move/Sens Assessment: normal.   Patient Tolerance: patient tolerated the procedure well with no apparent complications  Additional Notes  Ultrasound interpretation:  With ultrasound guidance, the available vessels were examined and the artery was found to be patent.  The needle and wire were seen entering the artery and the catheter was confirmed in the artery.  No abnormalities noted.

## 2021-10-06 NOTE — ANESTHESIA PROCEDURE NOTES
Airway  Urgency: elective    Date/Time: 10/6/2021 9:48 AM  Airway not difficult    General Information and Staff    Patient location during procedure: OR  Anesthesiologist: Enrique Josue MD    Indications and Patient Condition  Indications for airway management: airway protection    Preoxygenated: yes  Mask difficulty assessment: 1 - vent by mask    Final Airway Details  Final airway type: endotracheal airway      Successful airway: ETT  Cuffed: yes   Successful intubation technique: direct laryngoscopy  Facilitating devices/methods: intubating stylet  Endotracheal tube insertion site: oral  Blade: Elisha  Blade size: 3  ETT size (mm): 7.0  Cormack-Lehane Classification: grade IIa - partial view of glottis  Placement verified by: chest auscultation and capnometry   Cuff volume (mL): 5  Measured from: teeth  ETT/EBT  to teeth (cm): 22  Number of attempts at approach: 1  Assessment: lips, teeth, and gum same as pre-op and atraumatic intubation

## 2021-10-06 NOTE — ANESTHESIA POSTPROCEDURE EVALUATION
"Patient: Virginia Leiva    Procedure Summary     Date: 10/06/21 Room / Location: [facility] INTERVENTIONAL    Anesthesia Start: 0931 Anesthesia Stop: 1131    Procedure: IR ARCH AND 3 VESSEL HEAD Diagnosis:       Vertebrobasilar insufficiency      Dizziness      Ataxia      Dissection of cerebral arteries, nonruptured (HCC)      (Vertebrobasilar insufficiency)    Scheduled Providers:  Provider: Enrique Josue MD    Anesthesia Type: general ASA Status: 3          Anesthesia Type: general    Vitals  Vitals Value Taken Time   /75 10/06/21 1301   Temp     Pulse 66 10/06/21 1337   Resp     SpO2 93 % 10/06/21 1337   Vitals shown include unvalidated device data.        Post Anesthesia Care and Evaluation    Patient location during evaluation: PACU  Patient participation: complete - patient participated  Level of consciousness: awake and alert  Pain management: adequate  Airway patency: patent  Anesthetic complications: No anesthetic complications    Cardiovascular status: acceptable  Respiratory status: acceptable  Hydration status: acceptable    Comments: /75   Pulse 64   Resp 20   Ht 160 cm (63\")   Wt 99.8 kg (220 lb)   SpO2 94%   BMI 38.97 kg/m²       "

## 2021-10-06 NOTE — H&P
66-year-old right-handed female with a history of anxiety, osteoarthritis, GERD, headaches, kidney stones, hyperlipidemia, and B12 deficiency who presents for follow-up of dizziness and ataxia.  She has seen both ENT as well as the [facility] for her dizziness and ringing in the ears with work-up at both places showing no abnormalities.  The patient reports that her symptoms seem to be more prominent on the left side now.  She had a CTA of her head and neck.  There was moderate degrees of stenosis involving limbs that constitute a proximal aspect of the left vertebral artery, moderate stenosis of the left MCA and a 2 to 3 mm outpouching arising from the left supraclinoid ICA.  The patient reports snoring and she reports that she stays tired.  She does not have a history of diabetes, hypertension, or tobacco use.  She does not drink alcohol.  She has been on atorvastatin for last few months.  She drinks about 2 cans of soda a day.  She does not consume much red meat.  She reports that her dizziness prevents her from exercising.  She has not undergone conventional angiographic assessment.        The following portions of the patient's history were reviewed and updated as appropriate: allergies, current medications, past family history, past medical history, past social history, past surgical history and problem list.     Review of Systems   Constitutional: Positive for fatigue. Negative for chills.   HENT: Positive for tinnitus. Negative for ear pain.    Eyes: Negative for pain and visual disturbance.   Respiratory: Negative for cough and shortness of breath.    Cardiovascular: Negative for chest pain and palpitations.   Gastrointestinal: Negative for abdominal pain and nausea.   Genitourinary: Negative for difficulty urinating and enuresis.   Musculoskeletal: Positive for neck pain (occasionally).   Skin: Negative for rash.   Neurological: Positive for dizziness, tremors and headaches (positional when waking up).  Negative for seizures, syncope, speech difficulty, light-headedness and numbness.   Psychiatric/Behavioral: Negative for confusion and decreased concentration.               Objective           Vitals:     08/31/21 1247   BP: 120/82   Pulse: 68   Resp: 16   Temp: 98.6 °F (37 °C)      Body mass index is 38.26 kg/m².     Physical Exam  Vitals and nursing note reviewed.   Constitutional:       General: She is not in acute distress.     Appearance: She is obese. She is not ill-appearing.   HENT:      Head: Normocephalic.      Nose: Nose normal.      Mouth/Throat:      Mouth: Mucous membranes are moist.   Eyes:      Extraocular Movements: Extraocular movements intact.      Conjunctiva/sclera: Conjunctivae normal.      Pupils: Pupils are equal, round, and reactive to light.   Cardiovascular:      Rate and Rhythm: Normal rate.      Pulses: Normal pulses.   Pulmonary:      Effort: Pulmonary effort is normal.   Musculoskeletal:      Cervical back: Normal range of motion.   Skin:     General: Skin is warm and dry.   Neurological:      General: No focal deficit present.      Mental Status: She is alert and oriented to person, place, and time.      Cranial Nerves: No cranial nerve deficit.      Sensory: No sensory deficit.      Motor: No weakness.      Coordination: Coordination normal.      Gait: Gait normal.   Psychiatric:         Mood and Affect: Mood normal.         Behavior: Behavior normal.         Thought Content: Thought content normal.         Judgment: Judgment normal.         Neurologic Exam      Mental Status   Oriented to person, place, and time.      Cranial Nerves      CN III, IV, VI   Pupils are equal, round, and reactive to light.              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  The aneurysm  appears to measure approximately 3 mm.  Medical Decision Makin-year-old female with an abnormal CTA of the head neck with and aneurysm suspected as well as a left vertebral artery stenosis.  Angiographic evaluation is recommended and the risks, alternatives and procedure were reviewed with the patient who wishes to proceed.  The procedure will be done under general anesthesia in order to perform endovascular treatment should be required.  Diagnoses and all orders for this visit:     1. Other hyperlipidemia (Primary)     2. Vertebrobasilar insufficiency     3. Dizziness     4. Ataxia        Return in about 1 week (around 2021) for Cerebral Angiogram (DSA) with possible vertebral artery stent, Recheck.

## 2021-10-13 ENCOUNTER — IMMUNIZATION (OUTPATIENT)
Dept: VACCINE CLINIC | Facility: HOSPITAL | Age: 66
End: 2021-10-13

## 2021-10-13 PROCEDURE — 0003A: CPT | Performed by: INTERNAL MEDICINE

## 2021-10-13 PROCEDURE — 0004A ADM SARSCOV2 30MCG/0.3ML BOOSTER: CPT | Performed by: INTERNAL MEDICINE

## 2021-10-13 PROCEDURE — 91300 HC SARSCOV02 VAC 30MCG/0.3ML IM: CPT | Performed by: INTERNAL MEDICINE

## 2021-10-15 DIAGNOSIS — K22.2 ESOPHAGEAL STRICTURE: ICD-10-CM

## 2021-10-15 DIAGNOSIS — K21.9 GASTROESOPHAGEAL REFLUX DISEASE: ICD-10-CM

## 2021-10-18 LAB — ACT BLD: 114 SECONDS (ref 82–152)

## 2021-10-19 RX ORDER — OMEPRAZOLE 20 MG/1
CAPSULE, DELAYED RELEASE ORAL
Qty: 90 CAPSULE | Refills: 0 | Status: SHIPPED | OUTPATIENT
Start: 2021-10-19 | End: 2021-12-23

## 2021-10-19 NOTE — PROGRESS NOTES
Subjective   Patient ID: Virginia Leiva is a 66 y.o. female is here today for follow-up after a c-angio with stent on 10/6/21. Today pt reports some dizziness and balance issues.    Ms. Leiva underwent a patient requested telephone visit which lasted approximately 10 minutes.    66-year-old right-handed female with a history of anxiety, osteoarthritis, GERD, headaches, kidney stones, hyperlipidemia, and B12 deficiency who presents for follow-up of dizziness and ataxia.  She has seen both ENT as well as the [facility] for her dizziness and ringing in the ears with work-up at both places showing no abnormalities.  The patient reports that her symptoms seem to be more prominent on the left side now.  She had a CTA of her head and neck.  There was moderate degrees of stenosis involving limbs that constitute a proximal aspect of the left vertebral artery, moderate stenosis of the left MCA and a 2 to 3 mm outpouching arising from the left supraclinoid ICA.  The patient reports snoring and she reports that she stays tired.  She does not have a history of diabetes, hypertension, or tobacco use.  She does not drink alcohol.  She has been on atorvastatin for last few months.  She drinks about 2 cans of soda a day.  She does not consume much red meat.  She reports that her dizziness prevents her from exercising.  She has now undergone conventional angiographic assessment and is now for review of these findings.      The following portions of the patient's history were reviewed and updated as appropriate: allergies, current medications, past family history, past medical history, past social history, past surgical history and problem list.    Review of Systems   Eyes: Negative for visual disturbance.   Gastrointestinal: Negative for nausea and vomiting.   Musculoskeletal: Positive for gait problem.   Neurological: Positive for dizziness (same as before). Negative for seizures, syncope, speech difficulty, light-headedness,  numbness and headaches.   Psychiatric/Behavioral: Negative for confusion and decreased concentration.       Objective   Physical Exam  Constitutional:       General: She is not in acute distress.  Neurological:      Mental Status: She is alert and oriented to person, place, and time.   Psychiatric:         Mood and Affect: Mood normal.         Thought Content: Thought content normal.         Judgment: Judgment normal.       Neurologic Exam     Mental Status   Oriented to person, place, and time.       Assessment/Plan   Independent Review of Radiographic Studies:    The cerebral arteriogram dated 10/6/2021 was reviewed with the patient over the phone and showed no vertebrobasilar stenosis or vertebral artery narrowing bilaterally.  No vascular explanation for the patient's symptoms was appreciated.  Medical Decision Makin-year-old female with history of ataxia and dizziness and suspected vertebrobasilar insufficiency secondary to a stenosis, but conventional angiographic assessment showed no vascular explanation for the patient's symptoms with no narrowing in the vertebrobasilar circulation.  Diagnoses and all orders for this visit:    1. Vertebrobasilar insufficiency (Primary)    2. Dizziness    3. Ataxia      Return if symptoms worsen or fail to improve.

## 2021-10-26 ENCOUNTER — OFFICE VISIT (OUTPATIENT)
Dept: NEUROSURGERY | Facility: CLINIC | Age: 66
End: 2021-10-26

## 2021-10-26 DIAGNOSIS — G45.0 VERTEBROBASILAR INSUFFICIENCY: Primary | ICD-10-CM

## 2021-10-26 DIAGNOSIS — R27.0 ATAXIA: ICD-10-CM

## 2021-10-26 DIAGNOSIS — R42 DIZZINESS: ICD-10-CM

## 2021-10-26 PROCEDURE — 99212 OFFICE O/P EST SF 10 MIN: CPT | Performed by: RADIOLOGY

## 2021-10-26 RX ORDER — MULTIPLE VITAMINS W/ MINERALS TAB 9MG-400MCG
1 TAB ORAL 3 TIMES DAILY
COMMUNITY

## 2021-10-26 RX ORDER — ASPIRIN 325 MG
325 TABLET ORAL DAILY
COMMUNITY

## 2021-11-01 DIAGNOSIS — E78.49 OTHER HYPERLIPIDEMIA: ICD-10-CM

## 2021-11-01 RX ORDER — ATORVASTATIN CALCIUM 10 MG/1
TABLET, FILM COATED ORAL
Qty: 90 TABLET | Refills: 0 | Status: SHIPPED | OUTPATIENT
Start: 2021-11-01 | End: 2022-01-14

## 2021-12-13 ENCOUNTER — OFFICE VISIT (OUTPATIENT)
Dept: FAMILY MEDICINE CLINIC | Facility: CLINIC | Age: 66
End: 2021-12-13

## 2021-12-13 VITALS
TEMPERATURE: 98.3 F | RESPIRATION RATE: 16 BRPM | WEIGHT: 203 LBS | HEART RATE: 68 BPM | BODY MASS INDEX: 35.97 KG/M2 | HEIGHT: 63 IN | OXYGEN SATURATION: 98 % | SYSTOLIC BLOOD PRESSURE: 122 MMHG | DIASTOLIC BLOOD PRESSURE: 82 MMHG

## 2021-12-13 DIAGNOSIS — E53.8 B12 DEFICIENCY: ICD-10-CM

## 2021-12-13 DIAGNOSIS — D75.89 MACROCYTOSIS: ICD-10-CM

## 2021-12-13 DIAGNOSIS — E55.9 VITAMIN D DEFICIENCY: ICD-10-CM

## 2021-12-13 DIAGNOSIS — H93.19 TINNITUS, UNSPECIFIED LATERALITY: ICD-10-CM

## 2021-12-13 DIAGNOSIS — F41.8 DEPRESSION WITH ANXIETY: ICD-10-CM

## 2021-12-13 DIAGNOSIS — M25.561 BILATERAL CHRONIC KNEE PAIN: ICD-10-CM

## 2021-12-13 DIAGNOSIS — K21.9 GASTROESOPHAGEAL REFLUX DISEASE, UNSPECIFIED WHETHER ESOPHAGITIS PRESENT: ICD-10-CM

## 2021-12-13 DIAGNOSIS — G89.29 BILATERAL CHRONIC KNEE PAIN: ICD-10-CM

## 2021-12-13 DIAGNOSIS — I65.23 ATHEROSCLEROSIS OF BOTH CAROTID ARTERIES: ICD-10-CM

## 2021-12-13 DIAGNOSIS — R68.89 OTHER GENERAL SYMPTOMS AND SIGNS: ICD-10-CM

## 2021-12-13 DIAGNOSIS — K22.2 ESOPHAGEAL STRICTURE: ICD-10-CM

## 2021-12-13 DIAGNOSIS — M54.2 NECK PAIN ON LEFT SIDE: ICD-10-CM

## 2021-12-13 DIAGNOSIS — M25.562 BILATERAL CHRONIC KNEE PAIN: ICD-10-CM

## 2021-12-13 DIAGNOSIS — R42 DIZZINESS: ICD-10-CM

## 2021-12-13 DIAGNOSIS — E78.49 OTHER HYPERLIPIDEMIA: Primary | ICD-10-CM

## 2021-12-13 DIAGNOSIS — M19.90 ARTHRITIS: ICD-10-CM

## 2021-12-13 PROCEDURE — 99214 OFFICE O/P EST MOD 30 MIN: CPT | Performed by: PHYSICIAN ASSISTANT

## 2021-12-13 NOTE — PROGRESS NOTES
"Subjective   Virginia Leiva is a 66 y.o. female who presents today in follow-up of hyperlipidemia, vitamin D and B12 deficiencies, macrocytosis, GERD, moods, dizziness, back pain, arthritis, and history of fecal incontenence.    Hyperlipidemia  Pertinent negatives include no shortness of breath.   Anxiety  Symptoms include dizziness. Patient reports no shortness of breath.       Hyperlipidemia-has done well with starting atorvastatin and is tolerating without AE.  Vitamin D-taking 1000 IU once daily.  Macrocytosis, B12-taking B12 500 mcg daily.    GERD-has been stable on Prilosec without breakthrough symptoms.   · She had an EGD 5/2018 without stricture or need for dilation (history of stricture in the past).  She was noted to have medium/moderate hiatal hernia and mucosa was suggestive of eosinophilic esophagitis with negative biopsies for eosinophils.  He recommended repeat EGD in 3 to 5 years.    Moods- increased to Prozac 20 mg twice daily- has been doing ok.   · Prozac 20 mg once daily- tolerated without AE but felt she was snappy and crying easily. She was unsure it was working as well. Not sure if it was related to dizziness or her daughter's stressors or 's issues. She was still working from home 2 days a week and at work 3 days.     Dizziness/tinnitus- neurology was unable to find etiology of dizziness.  She continues with dizziness. Left top of head and parietal area. She wakes up with a headache- mostly on the left but some on the right as well. She notices that when she leans her head back and keeps it steady, she has improvement in dizziness. No neck pain, numbness or tingling in arms. Feels like \"when you sleep too much and wake up\"- feels that way all the time. At night, not moving much. Does not wake up but when she gets up, she feels weird. Affects the way she feels about things.   · Dizziness started 2/2020. When she gets up, she feels like her eyes are twitching and very occasionally feels " some spinning. Symptoms occur seldom when she was driving down the road, and she got nervous to focus and see. No consistent dizziness with head turning and no dizziness with laying down. She has had ringing in her ears for years with normal hearing and no ear pain. If she lays on 1 side too long, she has a headache- worse on the left. She denies confusion, trouble speaking, trouble using arms/ legs, and blurred vision. She has had numbness in hands intermittent but no other numbness or neurological symptoms.   · Last opt/ophthalmology was > 1 year ago. She was advised to go for annual eye exam.  · I referred for MRI brain and IAC with opacified hypoplastic right sphenoid sinus. She was treated for sinusitis and advised we can send to ENT.   · Carotid duplex with bilateral plaque without stenosis.    · ENT- Dr Lozano- he did ENG and testing. Told slight hearing loss but no other etiology of symtpoms.   · Neurosurgery-     Back pain-every once in a while has something but if takes a muscle relaxer x 1, is ok.   · No worsening, changing, new or different symptoms. She has not had any increased symptoms.  Patient uses Flexeril only occasionally with back pain. She was told it was arthritis at some point.   Arthritis-She uses Tylenol as needed.  · She had right MCP surgery 10/2018 and left MCP surgery 12/2018 and healed well.    · She also has chronic left knee pain, intermittent feels like it going to give out. She has been seen by orthopedic surgery with arthritis of her knees.      History of Fecal incontinence- no recurrence. Doing ok.   · She noted change in BM 8/2018 with increased fecal urgency and incontinence.  She also had stress fecal incontinence.  She had significant hemorrhoids on exam and possible mild rectal prolapse as well with decreased rectal tone.  Patient wanted to start with follow-up with Dr. Covarrubias but did not schedule follow-up with him.  She reports symptoms improved.    · I discussed the need  for follow-up with GI, MRI lumbosacral region, and consideration of further neurological work-up as well as consideration of pelvic floor strengthening PT.  She did not want to proceed with further work-up at that time and has had no recurrence of the symptoms.    Patient's Specialists:  Cardiology- Dr South- last appt 9/2018 for preop cleareance. EKG thought to be negative. Hyperlipidemia- advised low dose statin and risk reduction. Follow up PRN.   GI-Dr Covarrubias- last EGD 5/2018 medium hiatal hernia. Repeat in 3-5 years.   Hand surgery- Dr Tejada- last appt 1/2021 in follow up of radial digital nerve laceration left index finger with repair with vein graft 12/2020. Follow up in 3 months.   · Prior 1/2019 for left thumb carpometacarpal arthoplasty with LRTI and left de Quervain's release.   Neurology- Dr Carroll- last appt 6/2021 for dizziness. Recommended CTA head and neck and if negative, evaluation by Dr Fang. Scheduled 8/10/2021 and follow up with Dr Carroll 8/13/2021.   Orthopedic surgery- Dr Lee Roberto- last appt 8/2017 for patellofemoral arthritis left knee. Received injection. Advied try tumeric or glucosamine. Follow up PRN.   ENT- Dr Lozano- last appt 8/2020 for dizziness and tinnitus. Thought to be vascular blood supply and ordered ENG. Treated a second time for blocked sphenoid sinus.   Podiatry- Dr Julian- last appt 7/2020 for 5th metatarsal fracture. Advised MELONIE boot.     The following portions of the patient's history were reviewed and updated as appropriate: allergies, current medications, past family history, past medical history, past social history, past surgical history and problem list.    Review of Systems   Constitutional: Negative.  Negative for fever.   HENT: Positive for tinnitus. Negative for congestion, ear pain, postnasal drip and sore throat.    Respiratory: Negative.  Negative for shortness of breath.    Cardiovascular: Negative.    Gastrointestinal: Negative.    Genitourinary:  Negative.    Musculoskeletal: Positive for arthralgias, back pain, gait problem, neck pain and neck stiffness.   Neurological: Positive for dizziness.   Psychiatric/Behavioral: Positive for dysphoric mood (improving).       Objective    Vitals:    12/13/21 0821   BP: 122/82   Pulse: 68   Resp: 16   Temp: 98.3 °F (36.8 °C)   SpO2: 98%     Body mass index is 35.96 kg/m².    Physical Exam   Constitutional: She is oriented to person, place, and time. She appears well-developed. No distress.   HENT:   Head: Normocephalic and atraumatic.   Right Ear: External ear normal.   Left Ear: External ear normal.   Nose: Nose normal.   Eyes: Conjunctivae and lids are normal. Right eye exhibits no discharge. Left eye exhibits no discharge.   Neck: Carotid bruit is not present.   Cardiovascular: Normal rate, regular rhythm, normal heart sounds and normal pulses. Exam reveals no gallop and no friction rub.   No murmur heard.  Pulmonary/Chest: Effort normal and breath sounds normal. No respiratory distress. She has no wheezes. She has no rhonchi. She has no rales.   Abdominal: Normal appearance.   Musculoskeletal: No deformity.   Neurological: She is alert and oriented to person, place, and time. Gait normal.   Skin: Skin is warm and dry.   Psychiatric: Her speech is normal and behavior is normal. Mood, memory, affect, judgment and thought content normal. She is attentive.   Nursing note and vitals reviewed.      Assessment/Plan   Diagnoses and all orders for this visit:    1. Other hyperlipidemia (Primary)  -     Comprehensive Metabolic Panel  -     Lipid Panel With LDL / HDL Ratio    2. Vitamin D deficiency  -     Comprehensive Metabolic Panel  -     Vitamin D 25 Hydroxy    3. Macrocytosis  -     CBC & Differential  -     Vitamin B12 & Folate  -     TSH  -     T4, free  -     T3, Free    4. B12 deficiency  -     CBC & Differential  -     Vitamin B12 & Folate    5. Esophageal stricture    6. Gastroesophageal reflux disease,  unspecified whether esophagitis present    7. Depression with anxiety    8. Dizziness  -     MRI Cervical Spine Without Contrast; Future  -     CBC & Differential  -     Comprehensive Metabolic Panel  -     Lipid Panel With LDL / HDL Ratio  -     Vitamin B12 & Folate  -     Vitamin D 25 Hydroxy  -     TSH  -     T4, free  -     T3, Free  -     Urinalysis With Culture If Indicated -    9. Tinnitus, unspecified laterality    10. Neck pain on left side  -     MRI Cervical Spine Without Contrast; Future    11. Atherosclerosis of both carotid arteries    12. Arthritis    13. Bilateral chronic knee pain    14. Other general symptoms and signs   -     TSH  -     T4, free    Other orders  -     Specimen Status Report         Assessment and plan  Patient will have fasting labs. Call if no results in 1 week. Stability of conditions, plan, follow up, and further recommendations pending labs.  If stable, follow-up in 6 months.    · Hyperlipidemia-Last lipids were borderline. Continue atorvastatin 10 mg at bedtime.  Further treatment recommendations pending labs.     · Vitamin D deficiency-Continue vitamin D 1000 IU daily. Dosing recommendations following labs.   · B12 deficiency-Continue B12 500 mcg daily. Await labs for further recommendations.    · GERD-Controlled symptoms. Continue Prilosec 20 mg once daily. Patient needs to schedule repeat EGD- was due 5/2021.  · Depression with anxiety- Moods were uncontrolled with increased stressors and irritability. She has had improvement with Prozac 40 mg daily. To be seen ASAP if worsening moods or AE with medication.   · Dizziness/ Tinnitus- I referred for MRI brain and IAC as well as carotid duplex. Some sinus disease but otherwise negative. CTA head and neck to ensure no cerbrovascular etiology of symptoms and advised BPPV physical therapy/ exercises. She was seen by ENT who thought there could be a vascular etiology. They ordered VNG- no etiology of symptoms. She was seen by  neurology who also thought some cerebrovascular disease and advised Aspirin 325 mg daily and sleep study. She was seen by neurosurgery without etiology. I will contact neurology to determine dizziness specialist at [facility]. Also, she does have some neck pain- I will refer for MRI cervical spine. If no improvement, worsening, new, or changing symptoms, I will consider tertiary center.    · Back pain- Stable- Continue Tylenol and Flexeril as needed.   · Arthritis-Stable- to continue Tylenol as needed. To see hand surgery or orthopedic surgery if worsening.  · Fecal incontinence- If recurrence of symptoms or other symptoms, consider follow up with GI, the need for MRI lumbosacral region, consideration of further neurological work-up, as well as consideration of pelvic floor strengthening PT.  She should let me know if she is willing to proceed with further work-up.    I spent 30 minutes caring for Virginia Leiva on this date of service. This time includes time spent by me in the following activities: preparing for the visit, reviewing tests, specialists records, and previous visits, obtaining and/or reviewing a separately obtained history, performing a medically appropriate examination and/or evaluation, counseling and educating the patient/family/caregiver, referring and/or communicating with other health care professionals as necessary, documenting information in the medical record, independently interpreting results and communicating that information with the patient/family/caregiver, and developing a medically appropriate treatment plan with consideration of other conditions, medications, and treatments.

## 2021-12-14 LAB
25(OH)D3+25(OH)D2 SERPL-MCNC: 40.1 NG/ML (ref 30–100)
ALBUMIN SERPL-MCNC: 4.4 G/DL (ref 3.8–4.8)
ALBUMIN/GLOB SERPL: 2 {RATIO} (ref 1.2–2.2)
ALP SERPL-CCNC: 100 IU/L (ref 44–121)
ALT SERPL-CCNC: 22 IU/L (ref 0–32)
AST SERPL-CCNC: 20 IU/L (ref 0–40)
BASOPHILS # BLD AUTO: 0.1 X10E3/UL (ref 0–0.2)
BASOPHILS NFR BLD AUTO: 1 %
BILIRUB SERPL-MCNC: 0.7 MG/DL (ref 0–1.2)
BUN SERPL-MCNC: 14 MG/DL (ref 8–27)
BUN/CREAT SERPL: 14 (ref 12–28)
CALCIUM SERPL-MCNC: 9.8 MG/DL (ref 8.7–10.3)
CHLORIDE SERPL-SCNC: 105 MMOL/L (ref 96–106)
CHOLEST SERPL-MCNC: 160 MG/DL (ref 100–199)
CO2 SERPL-SCNC: 24 MMOL/L (ref 20–29)
CREAT SERPL-MCNC: 1.01 MG/DL (ref 0.57–1)
EOSINOPHIL # BLD AUTO: 0.2 X10E3/UL (ref 0–0.4)
EOSINOPHIL NFR BLD AUTO: 2 %
ERYTHROCYTE [DISTWIDTH] IN BLOOD BY AUTOMATED COUNT: 12.2 % (ref 11.7–15.4)
FOLATE SERPL-MCNC: >20 NG/ML
GLOBULIN SER CALC-MCNC: 2.2 G/DL (ref 1.5–4.5)
GLUCOSE SERPL-MCNC: 93 MG/DL (ref 65–99)
GLUCOSE UR QL: NORMAL
HCT VFR BLD AUTO: 42.9 % (ref 34–46.6)
HDLC SERPL-MCNC: 50 MG/DL
HGB BLD-MCNC: 13.8 G/DL (ref 11.1–15.9)
IMM GRANULOCYTES # BLD AUTO: 0 X10E3/UL (ref 0–0.1)
IMM GRANULOCYTES NFR BLD AUTO: 0 %
KETONES UR QL STRIP: NORMAL
LDLC SERPL CALC-MCNC: 93 MG/DL (ref 0–99)
LDLC/HDLC SERPL: 1.9 RATIO (ref 0–3.2)
LYMPHOCYTES # BLD AUTO: 2.1 X10E3/UL (ref 0.7–3.1)
LYMPHOCYTES NFR BLD AUTO: 35 %
MCH RBC QN AUTO: 29.9 PG (ref 26.6–33)
MCHC RBC AUTO-ENTMCNC: 32.2 G/DL (ref 31.5–35.7)
MCV RBC AUTO: 93 FL (ref 79–97)
MONOCYTES # BLD AUTO: 0.4 X10E3/UL (ref 0.1–0.9)
MONOCYTES NFR BLD AUTO: 7 %
NEUTROPHILS # BLD AUTO: 3.4 X10E3/UL (ref 1.4–7)
NEUTROPHILS NFR BLD AUTO: 55 %
PH UR STRIP: NORMAL [PH]
PLATELET # BLD AUTO: 183 X10E3/UL (ref 150–450)
POTASSIUM SERPL-SCNC: 4.3 MMOL/L (ref 3.5–5.2)
PROT SERPL-MCNC: 6.6 G/DL (ref 6–8.5)
PROT UR QL STRIP: NORMAL
RBC # BLD AUTO: 4.62 X10E6/UL (ref 3.77–5.28)
SODIUM SERPL-SCNC: 142 MMOL/L (ref 134–144)
SP GR UR: NORMAL
SPECIMEN STATUS: NORMAL
T3FREE SERPL-MCNC: 2.7 PG/ML (ref 2–4.4)
T4 FREE SERPL-MCNC: 1.12 NG/DL (ref 0.82–1.77)
TRIGL SERPL-MCNC: 94 MG/DL (ref 0–149)
TSH SERPL DL<=0.005 MIU/L-ACNC: 0.61 UIU/ML (ref 0.45–4.5)
VIT B12 SERPL-MCNC: 1235 PG/ML (ref 232–1245)
VLDLC SERPL CALC-MCNC: 17 MG/DL (ref 5–40)
WBC # BLD AUTO: 6.2 X10E3/UL (ref 3.4–10.8)

## 2021-12-17 ENCOUNTER — TELEPHONE (OUTPATIENT)
Dept: FAMILY MEDICINE CLINIC | Facility: CLINIC | Age: 66
End: 2021-12-17

## 2021-12-17 DIAGNOSIS — R42 DIZZINESS: Primary | ICD-10-CM

## 2021-12-17 NOTE — TELEPHONE ENCOUNTER
----- Message from Teetee Carroll MD sent at 12/13/2021 11:11 AM EST -----  Dr. Fang is an ENT that I refer to.  There is also a dizzy clinic ran by an NP, Marcela Humphreys, with [facility].    ----- Message -----  From: Kacey Hernadez PA  Sent: 12/13/2021   9:41 AM EST  To: Teetee Carroll MD    This patient is still having dizziness- she has seen ENT, neurosurgery, and has a 2nd opinion appointment with another ENT for a second opinion. I was wondering if you knew more about the dizziness specialist at [facility]. I wasn't sure who to refer to.

## 2021-12-20 NOTE — TELEPHONE ENCOUNTER
She tells me that she had a second opinion ENT appointment.  I told her I would look into who the dizziness specialist was eval.  It turns out it is an ENT.  I would recommend seeing the dizziness ENT.  I will leave this up to the patient

## 2021-12-20 NOTE — TELEPHONE ENCOUNTER
Please inform her that this is the dizziness specialist.  I would recommend she see Dr. Fang instead of the other ENT that she was going to for the second opinion.  We discussed the fact that there is a specialist for dizziness-this is who the neurologist recommends.

## 2021-12-20 NOTE — TELEPHONE ENCOUNTER
PT INFORMED OF REFFERAL SHE DECLINED APPT STATES SHE FOUND ANOTHER DOC AND HAS APPT SCHEDULED FOR JANUARY

## 2021-12-22 DIAGNOSIS — K21.9 GASTROESOPHAGEAL REFLUX DISEASE: ICD-10-CM

## 2021-12-22 DIAGNOSIS — K22.2 ESOPHAGEAL STRICTURE: ICD-10-CM

## 2021-12-23 DIAGNOSIS — K22.2 ESOPHAGEAL STRICTURE: ICD-10-CM

## 2021-12-23 DIAGNOSIS — K21.9 GASTROESOPHAGEAL REFLUX DISEASE: ICD-10-CM

## 2021-12-23 RX ORDER — FLUOXETINE HYDROCHLORIDE 20 MG/1
CAPSULE ORAL
Qty: 180 CAPSULE | Refills: 2 | Status: SHIPPED | OUTPATIENT
Start: 2021-12-23 | End: 2022-08-03

## 2021-12-23 RX ORDER — OMEPRAZOLE 20 MG/1
CAPSULE, DELAYED RELEASE ORAL
Qty: 90 CAPSULE | Refills: 0 | Status: SHIPPED | OUTPATIENT
Start: 2021-12-23 | End: 2022-03-09

## 2022-01-14 DIAGNOSIS — E78.49 OTHER HYPERLIPIDEMIA: ICD-10-CM

## 2022-01-14 RX ORDER — ATORVASTATIN CALCIUM 10 MG/1
TABLET, FILM COATED ORAL
Qty: 90 TABLET | Refills: 0 | Status: SHIPPED | OUTPATIENT
Start: 2022-01-14 | End: 2022-03-31

## 2022-01-16 ENCOUNTER — APPOINTMENT (OUTPATIENT)
Dept: MRI IMAGING | Facility: HOSPITAL | Age: 67
End: 2022-01-16

## 2022-02-05 ENCOUNTER — APPOINTMENT (OUTPATIENT)
Dept: MRI IMAGING | Facility: HOSPITAL | Age: 67
End: 2022-02-05

## 2022-02-26 ENCOUNTER — HOSPITAL ENCOUNTER (OUTPATIENT)
Dept: MRI IMAGING | Facility: HOSPITAL | Age: 67
Discharge: HOME | End: 2022-02-26
Admitting: PHYSICIAN ASSISTANT

## 2022-02-26 DIAGNOSIS — R42 DIZZINESS: ICD-10-CM

## 2022-02-26 DIAGNOSIS — M54.2 NECK PAIN ON LEFT SIDE: ICD-10-CM

## 2022-02-26 PROCEDURE — 72141 MRI NECK SPINE W/O DYE: CPT

## 2022-03-08 DIAGNOSIS — K21.9 GASTROESOPHAGEAL REFLUX DISEASE: ICD-10-CM

## 2022-03-08 DIAGNOSIS — K22.2 ESOPHAGEAL STRICTURE: ICD-10-CM

## 2022-03-09 RX ORDER — OMEPRAZOLE 20 MG/1
CAPSULE, DELAYED RELEASE ORAL
Qty: 90 CAPSULE | Refills: 0 | Status: SHIPPED | OUTPATIENT
Start: 2022-03-09 | End: 2022-05-13

## 2022-03-31 DIAGNOSIS — E78.49 OTHER HYPERLIPIDEMIA: ICD-10-CM

## 2022-03-31 RX ORDER — ATORVASTATIN CALCIUM 10 MG/1
TABLET, FILM COATED ORAL
Qty: 90 TABLET | Refills: 0 | Status: SHIPPED | OUTPATIENT
Start: 2022-03-31 | End: 2022-06-13

## 2022-03-31 NOTE — TELEPHONE ENCOUNTER
Last ov  12/13/21    Patient will have fasting labs. Call if no results in 1 week. Stability of conditions, plan, follow up, and further recommendations pending labs.  If stable, follow-up in 6 months      Last lab 12/13/21      Labs were stable.  Continue all medications and vitamins and follow-up with me 6/2022 for 6-month follow-up, fasting.     Cervical spine MRI with mild to moderate OA and degenerative changes.  Minimal spinal stenosis at C5-6 and possible left nerve root involvement exiting left C6 nerve root.  Please see if she is willing to do physical therapy

## 2022-05-13 DIAGNOSIS — K22.2 ESOPHAGEAL STRICTURE: ICD-10-CM

## 2022-05-13 DIAGNOSIS — K21.9 GASTROESOPHAGEAL REFLUX DISEASE: ICD-10-CM

## 2022-05-13 RX ORDER — OMEPRAZOLE 20 MG/1
CAPSULE, DELAYED RELEASE ORAL
Qty: 30 CAPSULE | Refills: 0 | Status: SHIPPED | OUTPATIENT
Start: 2022-05-13 | End: 2022-06-13

## 2022-06-11 DIAGNOSIS — K21.9 GASTROESOPHAGEAL REFLUX DISEASE: ICD-10-CM

## 2022-06-11 DIAGNOSIS — K22.2 ESOPHAGEAL STRICTURE: ICD-10-CM

## 2022-06-13 DIAGNOSIS — E78.49 OTHER HYPERLIPIDEMIA: ICD-10-CM

## 2022-06-13 RX ORDER — OMEPRAZOLE 20 MG/1
CAPSULE, DELAYED RELEASE ORAL
Qty: 14 CAPSULE | Refills: 0 | Status: SHIPPED | OUTPATIENT
Start: 2022-06-13 | End: 2022-06-24

## 2022-06-13 RX ORDER — ATORVASTATIN CALCIUM 10 MG/1
TABLET, FILM COATED ORAL
Qty: 30 TABLET | Refills: 0 | Status: SHIPPED | OUTPATIENT
Start: 2022-06-13 | End: 2022-07-05

## 2022-06-17 ENCOUNTER — OFFICE VISIT (OUTPATIENT)
Dept: FAMILY MEDICINE CLINIC | Facility: CLINIC | Age: 67
End: 2022-06-17

## 2022-06-17 VITALS
OXYGEN SATURATION: 96 % | WEIGHT: 201 LBS | SYSTOLIC BLOOD PRESSURE: 138 MMHG | BODY MASS INDEX: 35.61 KG/M2 | HEART RATE: 77 BPM | DIASTOLIC BLOOD PRESSURE: 72 MMHG | HEIGHT: 63 IN | TEMPERATURE: 96.4 F

## 2022-06-17 DIAGNOSIS — R42 DIZZINESS: ICD-10-CM

## 2022-06-17 DIAGNOSIS — K22.2 ESOPHAGEAL STRICTURE: ICD-10-CM

## 2022-06-17 DIAGNOSIS — E78.49 OTHER HYPERLIPIDEMIA: Primary | ICD-10-CM

## 2022-06-17 DIAGNOSIS — K21.9 GASTROESOPHAGEAL REFLUX DISEASE, UNSPECIFIED WHETHER ESOPHAGITIS PRESENT: ICD-10-CM

## 2022-06-17 DIAGNOSIS — H93.19 TINNITUS, UNSPECIFIED LATERALITY: ICD-10-CM

## 2022-06-17 DIAGNOSIS — G89.29 CHRONIC BILATERAL LOW BACK PAIN WITH SCIATICA, SCIATICA LATERALITY UNSPECIFIED: ICD-10-CM

## 2022-06-17 DIAGNOSIS — D75.89 MACROCYTOSIS: ICD-10-CM

## 2022-06-17 DIAGNOSIS — M47.812 FACET ARTHROPATHY, CERVICAL: ICD-10-CM

## 2022-06-17 DIAGNOSIS — E55.9 VITAMIN D DEFICIENCY: ICD-10-CM

## 2022-06-17 DIAGNOSIS — M50.30 DEGENERATIVE DISC DISEASE, CERVICAL: ICD-10-CM

## 2022-06-17 DIAGNOSIS — M54.2 NECK PAIN ON LEFT SIDE: ICD-10-CM

## 2022-06-17 DIAGNOSIS — M54.40 CHRONIC BILATERAL LOW BACK PAIN WITH SCIATICA, SCIATICA LATERALITY UNSPECIFIED: ICD-10-CM

## 2022-06-17 DIAGNOSIS — E53.8 B12 DEFICIENCY: ICD-10-CM

## 2022-06-17 DIAGNOSIS — F41.8 DEPRESSION WITH ANXIETY: ICD-10-CM

## 2022-06-17 PROBLEM — M84.374A STRESS FRACTURE OF METATARSAL BONE OF RIGHT FOOT: Status: ACTIVE | Noted: 2022-06-17

## 2022-06-17 PROCEDURE — 99214 OFFICE O/P EST MOD 30 MIN: CPT | Performed by: PHYSICIAN ASSISTANT

## 2022-06-21 LAB
25(OH)D3+25(OH)D2 SERPL-MCNC: 42.8 NG/ML (ref 30–100)
ALBUMIN SERPL-MCNC: 4.3 G/DL (ref 3.8–4.8)
ALBUMIN/GLOB SERPL: 1.9 {RATIO} (ref 1.2–2.2)
ALP SERPL-CCNC: 96 IU/L (ref 44–121)
ALT SERPL-CCNC: 23 IU/L (ref 0–32)
AST SERPL-CCNC: 23 IU/L (ref 0–40)
BASOPHILS # BLD AUTO: 0 X10E3/UL (ref 0–0.2)
BASOPHILS NFR BLD AUTO: 1 %
BILIRUB SERPL-MCNC: 0.7 MG/DL (ref 0–1.2)
BUN SERPL-MCNC: 14 MG/DL (ref 8–27)
BUN/CREAT SERPL: 15 (ref 12–28)
CALCIUM SERPL-MCNC: 9.2 MG/DL (ref 8.7–10.3)
CHLORIDE SERPL-SCNC: 108 MMOL/L (ref 96–106)
CHOLEST SERPL-MCNC: 159 MG/DL (ref 100–199)
CK SERPL-CCNC: 109 U/L (ref 32–182)
CO2 SERPL-SCNC: 21 MMOL/L (ref 20–29)
CREAT SERPL-MCNC: 0.95 MG/DL (ref 0.57–1)
EGFRCR SERPLBLD CKD-EPI 2021: 66 ML/MIN/1.73
EOSINOPHIL # BLD AUTO: 0.2 X10E3/UL (ref 0–0.4)
EOSINOPHIL NFR BLD AUTO: 3 %
ERYTHROCYTE [DISTWIDTH] IN BLOOD BY AUTOMATED COUNT: 12.4 % (ref 11.7–15.4)
FOLATE SERPL-MCNC: >20 NG/ML
GLOBULIN SER CALC-MCNC: 2.3 G/DL (ref 1.5–4.5)
GLUCOSE SERPL-MCNC: 102 MG/DL (ref 65–99)
HCT VFR BLD AUTO: 41.8 % (ref 34–46.6)
HDLC SERPL-MCNC: 46 MG/DL
HGB BLD-MCNC: 13.9 G/DL (ref 11.1–15.9)
IMM GRANULOCYTES # BLD AUTO: 0 X10E3/UL (ref 0–0.1)
IMM GRANULOCYTES NFR BLD AUTO: 0 %
LDLC SERPL CALC-MCNC: 93 MG/DL (ref 0–99)
LDLC/HDLC SERPL: 2 RATIO (ref 0–3.2)
LYMPHOCYTES # BLD AUTO: 2 X10E3/UL (ref 0.7–3.1)
LYMPHOCYTES NFR BLD AUTO: 30 %
MCH RBC QN AUTO: 30.8 PG (ref 26.6–33)
MCHC RBC AUTO-ENTMCNC: 33.3 G/DL (ref 31.5–35.7)
MCV RBC AUTO: 93 FL (ref 79–97)
MONOCYTES # BLD AUTO: 0.5 X10E3/UL (ref 0.1–0.9)
MONOCYTES NFR BLD AUTO: 7 %
NEUTROPHILS # BLD AUTO: 4 X10E3/UL (ref 1.4–7)
NEUTROPHILS NFR BLD AUTO: 59 %
PLATELET # BLD AUTO: 175 X10E3/UL (ref 150–450)
POTASSIUM SERPL-SCNC: 4.5 MMOL/L (ref 3.5–5.2)
PROT SERPL-MCNC: 6.6 G/DL (ref 6–8.5)
RBC # BLD AUTO: 4.52 X10E6/UL (ref 3.77–5.28)
SODIUM SERPL-SCNC: 143 MMOL/L (ref 134–144)
TRIGL SERPL-MCNC: 111 MG/DL (ref 0–149)
VIT B12 SERPL-MCNC: 569 PG/ML (ref 232–1245)
VLDLC SERPL CALC-MCNC: 20 MG/DL (ref 5–40)
WBC # BLD AUTO: 6.8 X10E3/UL (ref 3.4–10.8)

## 2022-06-24 DIAGNOSIS — K21.9 GASTROESOPHAGEAL REFLUX DISEASE: ICD-10-CM

## 2022-06-24 DIAGNOSIS — K22.2 ESOPHAGEAL STRICTURE: ICD-10-CM

## 2022-06-24 RX ORDER — OMEPRAZOLE 20 MG/1
CAPSULE, DELAYED RELEASE ORAL
Qty: 90 CAPSULE | Refills: 1 | Status: SHIPPED | OUTPATIENT
Start: 2022-06-24 | End: 2023-03-08

## 2022-07-05 DIAGNOSIS — E78.49 OTHER HYPERLIPIDEMIA: ICD-10-CM

## 2022-07-05 RX ORDER — ATORVASTATIN CALCIUM 10 MG/1
TABLET, FILM COATED ORAL
Qty: 90 TABLET | Refills: 1 | Status: SHIPPED | OUTPATIENT
Start: 2022-07-05 | End: 2023-02-13

## 2022-08-03 DIAGNOSIS — K22.2 ESOPHAGEAL STRICTURE: ICD-10-CM

## 2022-08-03 DIAGNOSIS — K21.9 GASTROESOPHAGEAL REFLUX DISEASE: ICD-10-CM

## 2022-08-03 RX ORDER — FLUOXETINE HYDROCHLORIDE 20 MG/1
CAPSULE ORAL
Qty: 180 CAPSULE | Refills: 1 | Status: SHIPPED | OUTPATIENT
Start: 2022-08-03 | End: 2023-04-06

## 2023-02-11 DIAGNOSIS — E78.49 OTHER HYPERLIPIDEMIA: ICD-10-CM

## 2023-02-13 RX ORDER — ATORVASTATIN CALCIUM 10 MG/1
TABLET, FILM COATED ORAL
Qty: 90 TABLET | Refills: 1 | Status: SHIPPED | OUTPATIENT
Start: 2023-02-13

## 2023-03-03 ENCOUNTER — OFFICE VISIT (OUTPATIENT)
Dept: FAMILY MEDICINE CLINIC | Facility: CLINIC | Age: 68
End: 2023-03-03
Payer: MEDICARE

## 2023-03-03 VITALS
HEART RATE: 81 BPM | DIASTOLIC BLOOD PRESSURE: 80 MMHG | SYSTOLIC BLOOD PRESSURE: 134 MMHG | WEIGHT: 201 LBS | OXYGEN SATURATION: 98 % | BODY MASS INDEX: 35.61 KG/M2 | HEIGHT: 63 IN | TEMPERATURE: 97.5 F

## 2023-03-03 DIAGNOSIS — Z78.0 POSTMENOPAUSAL: ICD-10-CM

## 2023-03-03 DIAGNOSIS — Z12.31 ENCOUNTER FOR SCREENING MAMMOGRAM FOR MALIGNANT NEOPLASM OF BREAST: ICD-10-CM

## 2023-03-03 DIAGNOSIS — E53.8 B12 DEFICIENCY: ICD-10-CM

## 2023-03-03 DIAGNOSIS — H93.19 TINNITUS, UNSPECIFIED LATERALITY: ICD-10-CM

## 2023-03-03 DIAGNOSIS — M54.40 CHRONIC BILATERAL LOW BACK PAIN WITH SCIATICA, SCIATICA LATERALITY UNSPECIFIED: ICD-10-CM

## 2023-03-03 DIAGNOSIS — I65.23 ATHEROSCLEROSIS OF BOTH CAROTID ARTERIES: ICD-10-CM

## 2023-03-03 DIAGNOSIS — K21.9 GASTROESOPHAGEAL REFLUX DISEASE, UNSPECIFIED WHETHER ESOPHAGITIS PRESENT: ICD-10-CM

## 2023-03-03 DIAGNOSIS — M54.2 NECK PAIN ON LEFT SIDE: ICD-10-CM

## 2023-03-03 DIAGNOSIS — R42 DIZZINESS: ICD-10-CM

## 2023-03-03 DIAGNOSIS — M25.561 BILATERAL CHRONIC KNEE PAIN: ICD-10-CM

## 2023-03-03 DIAGNOSIS — Z12.11 COLON CANCER SCREENING: ICD-10-CM

## 2023-03-03 DIAGNOSIS — F41.8 DEPRESSION WITH ANXIETY: ICD-10-CM

## 2023-03-03 DIAGNOSIS — E78.49 OTHER HYPERLIPIDEMIA: ICD-10-CM

## 2023-03-03 DIAGNOSIS — M50.30 DEGENERATIVE DISC DISEASE, CERVICAL: ICD-10-CM

## 2023-03-03 DIAGNOSIS — M47.812 FACET ARTHROPATHY, CERVICAL: ICD-10-CM

## 2023-03-03 DIAGNOSIS — Z00.00 MEDICARE ANNUAL WELLNESS VISIT, INITIAL: Primary | ICD-10-CM

## 2023-03-03 DIAGNOSIS — G89.29 BILATERAL CHRONIC KNEE PAIN: ICD-10-CM

## 2023-03-03 DIAGNOSIS — D75.89 MACROCYTOSIS: ICD-10-CM

## 2023-03-03 DIAGNOSIS — M19.90 ARTHRITIS: ICD-10-CM

## 2023-03-03 DIAGNOSIS — E55.9 VITAMIN D DEFICIENCY: ICD-10-CM

## 2023-03-03 DIAGNOSIS — G89.29 CHRONIC BILATERAL LOW BACK PAIN WITH SCIATICA, SCIATICA LATERALITY UNSPECIFIED: ICD-10-CM

## 2023-03-03 DIAGNOSIS — K22.2 ESOPHAGEAL STRICTURE: ICD-10-CM

## 2023-03-03 DIAGNOSIS — M25.562 BILATERAL CHRONIC KNEE PAIN: ICD-10-CM

## 2023-03-03 PROCEDURE — 99213 OFFICE O/P EST LOW 20 MIN: CPT | Performed by: PHYSICIAN ASSISTANT

## 2023-03-03 PROCEDURE — 1126F AMNT PAIN NOTED NONE PRSNT: CPT | Performed by: PHYSICIAN ASSISTANT

## 2023-03-03 PROCEDURE — G0439 PPPS, SUBSEQ VISIT: HCPCS | Performed by: PHYSICIAN ASSISTANT

## 2023-03-03 PROCEDURE — 1170F FXNL STATUS ASSESSED: CPT | Performed by: PHYSICIAN ASSISTANT

## 2023-03-03 PROCEDURE — 1159F MED LIST DOCD IN RCRD: CPT | Performed by: PHYSICIAN ASSISTANT

## 2023-03-03 RX ORDER — FOLIC ACID 1 MG/1
1 TABLET ORAL DAILY
COMMUNITY

## 2023-03-03 NOTE — PROGRESS NOTES
"Subjective   Virginia Leiva is a 67 y.o. female who presents today in follow-up of hyperlipidemia, vitamin D and B12 deficiencies, macrocytosis, GERD, moods, dizziness, back pain, arthritis, and history of fecal incontenence.    Hyperlipidemia  Pertinent negatives include no shortness of breath.   Anxiety  Symptoms include dizziness. Patient reports no shortness of breath.         Has discoloration of skin left axilla- now spreading- was read and now not as red. No pain or itching. It was really read and now faded but has spread in size.     Hyperlipidemia-has done well with starting atorvastatin and is tolerating without AE.  Vitamin D-taking 1000 IU once daily.  Macrocytosis, B12-taking B12 500 mcg daily.  Now on folic acid instead of MTV.     GERD-has been stable on Prilosec without breakthrough symptoms. If misses a dose, she feels it immediately.   · She had an EGD 5/2018 without stricture or need for dilation (history of stricture in the past).  She was noted to have medium/moderate hiatal hernia and mucosa was suggestive of eosinophilic esophagitis with negative biopsies for eosinophils.  He recommended repeat EGD in 3 to 5 years.    Moods- increased to Prozac 20 mg twice daily- has been doing ok.   · Prozac 20 mg once daily- tolerated without AE but felt she was snappy and crying easily. She was unsure it was working as well. Not sure if it was related to dizziness or her daughter's stressors or 's issues. She was still working from home 2 days a week and at work 3 days.     Dizziness/tinnitus- neurology was unable to find etiology of dizziness.  She continues with dizziness. Left top of head and parietal area. She wakes up with a headache- mostly on the left but some on the right as well. She notices that when she leans her head back and keeps it steady, she has improvement in dizziness. No neck pain, numbness or tingling in arms. Feels like \"when you sleep too much and wake up\"- feels that way all " the time. At night, not moving much. Does not wake up but when she gets up, she feels weird. Affects the way she feels about things.   · Dizziness started 2/2020. When she gets up, she feels like her eyes are twitching and very occasionally feels some spinning. Symptoms occur seldom when she was driving down the road, and she got nervous to focus and see. No consistent dizziness with head turning and no dizziness with laying down. She has had ringing in her ears for years with normal hearing and no ear pain. If she lays on 1 side too long, she has a headache- worse on the left. She denies confusion, trouble speaking, trouble using arms/ legs, and blurred vision. She has had numbness in hands intermittent but no other numbness or neurological symptoms.   · Last opt/ophthalmology was > 1 year ago. She was advised to go for annual eye exam.  · I referred for MRI brain and IAC with opacified hypoplastic right sphenoid sinus. She was treated for sinusitis and advised we can send to ENT.   · Carotid duplex with bilateral plaque without stenosis.    · ENT- Dr Lozano- he did ENG and testing. Told slight hearing loss but no other etiology of symtpoms.   · Neurosurgery-     Dizziness, unsteady gait, neck pain, DDD/OA/facet arthropathy cervical spine- no numbness, tingling or weakness in arm or hand    Back pain-every once in a while has something but if takes a muscle relaxer x 1, is ok.   · No worsening, changing, new or different symptoms. She has not had any increased symptoms.  Patient uses Flexeril only occasionally with back pain. She was told it was arthritis at some point.   History of Fecal incontinence- no recurrence. Doing ok.   · She noted change in BM 8/2018 with increased fecal urgency and incontinence.  She also had stress fecal incontinence.  She had significant hemorrhoids on exam and possible mild rectal prolapse as well with decreased rectal tone.  Patient wanted to start with follow-up with Dr. Covarrubias but  did not schedule follow-up with him.  She reports symptoms improved.    · I discussed the need for follow-up with GI, MRI lumbosacral region, and consideration of further neurological work-up as well as consideration of pelvic floor strengthening PT.  She did not want to proceed with further work-up at that time and has had no recurrence of the symptoms    Arthritis-She uses Tylenol as needed.  · She had right MCP surgery 10/2018 and left MCP surgery 12/2018 and healed well.    · She also has chronic left knee pain, intermittent feels like it going to give out. She has been seen by orthopedic surgery with arthritis of her knees.      Patient's Specialists:  Cardiology- Dr South- last appt 9/2018 for preop cleareance. EKG thought to be negative. Hyperlipidemia- advised low dose statin and risk reduction. Follow up PRN.   GI-Dr Covarrubias- last EGD 5/2018 medium hiatal hernia. Repeat in 3-5 years.   Hand surgery- Dr Tejada- last appt 1/2021 in follow up of radial digital nerve laceration left index finger with repair with vein graft 12/2020. Follow up in 3 months.   · Prior 1/2019 for left thumb carpometacarpal arthoplasty with LRTI and left de Quervain's release.   Neurology- Dr Carroll- last appt 8/2021 for dizziness, vertebrobasilar insufficiency, intracranial cerebrovascular stenosis.  Increase aspirin to 325 mg daily.  Referred to neurosurgery for possible arteriogram to determine outpouching as infundibulum versus aneurysm.  Advised him sleep study.  Follow-up after sleep study.   Neurosurgery-Dr. Quiñones- no vertebrobasilar stenosis or vertebral artery narrowing bilaterally.  No vascular explanation for symptoms.  Follow-up as needed.  Orthopedic surgery- Dr Lee Roberto- last appt 8/2017 for patellofemoral arthritis left knee. Received injection. Advied try tumeric or glucosamine. Follow up PRN.   ENT-Dr. Severtson-last appt 3/2022 for generalized disequilibrium, dizziness, tinnitus, and allergic rhinitis.   Advised increased activity level, RAD[facility] through [facility] rehab, avoid caffeine, nicotine, chocolate, and salt.  CBT versus TCA for tinnitus.  Consider hearing aid evaluation.  Prior Dr Lozano- . Treated a second time for blocked sphenoid sinus.   Podiatry- Dr Julian- last appt 7/2020 for 5th metatarsal fracture. Advised MELONIE boot.     The following portions of the patient's history were reviewed and updated as appropriate: allergies, current medications, past family history, past medical history, past social history, past surgical history and problem list.    Review of Systems   Constitutional: Negative.  Negative for fever.   HENT: Positive for tinnitus. Negative for congestion, ear pain, postnasal drip and sore throat.    Respiratory: Negative.  Negative for shortness of breath.    Cardiovascular: Negative.    Gastrointestinal: Negative.    Genitourinary: Negative.    Musculoskeletal: Positive for arthralgias, back pain, gait problem, neck pain and neck stiffness.   Skin: Positive for rash.   Neurological: Positive for dizziness.   Psychiatric/Behavioral: Dysphoric mood: improving.       Objective    Vitals:    03/03/23 0757   BP: 134/80   Pulse: 81   Temp: 97.5 °F (36.4 °C)   SpO2: 98%     Body mass index is 35.61 kg/m².    Physical Exam   Constitutional: She is oriented to person, place, and time. She appears well-developed. No distress.   HENT:   Head: Normocephalic and atraumatic.   Right Ear: External ear normal.   Left Ear: External ear normal.   Nose: Nose normal.   Eyes: Conjunctivae and lids are normal. Right eye exhibits no discharge. Left eye exhibits no discharge.   Neck: Carotid bruit is not present.   Cardiovascular: Normal rate, regular rhythm, normal heart sounds and normal pulses. Exam reveals no gallop and no friction rub.   No murmur heard.  Pulmonary/Chest: Effort normal and breath sounds normal. No respiratory distress. She has no wheezes. She has no rhonchi. She has no rales.   Abdominal: Normal  appearance.   Musculoskeletal: No deformity.   Neurological: She is alert and oriented to person, place, and time. Gait normal.   Skin: Skin is warm and dry.        Psychiatric: Her speech is normal and behavior is normal. Mood, memory, affect, judgment and thought content normal. She is attentive.   Nursing note and vitals reviewed.      Assessment & Plan   Diagnoses and all orders for this visit:    1. Medicare annual wellness visit, initial (Primary)    2. Encounter for screening mammogram for malignant neoplasm of breast  -     Mammo screening digital tomosynthesis bilateral w CAD; Future    3. Postmenopausal  -     DEXA Bone Density Axial; Future    4. Colon cancer screening  -     Cologuard - Stool, Per Rectum; Future    5. Other hyperlipidemia  -     Comprehensive Metabolic Panel  -     CK  -     Lipid Panel With LDL / HDL Ratio  -     Urinalysis With Culture If Indicated -    6. Vitamin D deficiency  -     Comprehensive Metabolic Panel  -     Vitamin D,25-Hydroxy  -     Urinalysis With Culture If Indicated -    7. Macrocytosis  -     CBC & Differential  -     Vitamin B12 & Folate  -     Urinalysis With Culture If Indicated -    8. B12 deficiency  -     CBC & Differential  -     Vitamin B12 & Folate  -     Urinalysis With Culture If Indicated -    9. Gastroesophageal reflux disease, unspecified whether esophagitis present    10. Esophageal stricture    11. Depression with anxiety    12. Dizziness  -     Urinalysis With Culture If Indicated -    13. Tinnitus, unspecified laterality    14. Chronic bilateral low back pain with sciatica, sciatica laterality unspecified    15. Neck pain on left side    16. Degenerative disc disease, cervical    17. Facet arthropathy, cervical    18. Atherosclerosis of both carotid arteries    19. Arthritis    20. Bilateral chronic knee pain    Other orders  -     Microscopic Examination -  -     Urine culture, Comprehensive - ,         Assessment and plan  AWV completed today. She  is overdue for mammogram, DEXA, and colon cancer screening. She declines colonoscopy- I will place order for Cologuard. She should contact insurance to determine coverage and location of coverage for Pneumovax. We need to obtain records/ date for 2nd Shingrix vaccination.     Patient will have fasting labs. Call if no results in 1 week. Stability of conditions, plan, follow up, and further recommendations pending labs.  If stable, follow-up in 6 months.    · Hyperlipidemia-Last lipids were stable. Continue atorvastatin 10 mg at bedtime.  Further treatment recommendations pending labs.     · Vitamin D deficiency-Continue vitamin D 1000 IU daily. Dosing recommendations following labs.   · B12 deficiency-Continue B12 500 mcg daily. Await labs for further recommendations.    · GERD-Controlled symptoms. Continue Prilosec 20 mg once daily. Patient needs to schedule repeat EGD- was due 5/2021.  · Depression with anxiety- Moods were uncontrolled with increased stressors and irritability. She has had improvement with Prozac 20 mg twice daily. To be seen ASAP if worsening moods or AE with medication.   · Dizziness/ Tinnitus- I referred for MRI brain and IAC as well as carotid duplex with some sinus disease but otherwise negative. CTA head and neck to ensure no cerbrovascular etiology of symptoms and advised BPPV physical therapy/ exercises. She was seen by ENT who thought there could be a vascular etiology. They ordered VNG- no etiology of symptoms. She was seen by neurology who also thought some cerebrovascular disease and advised Aspirin 325 mg daily and sleep study. She was seen by neurosurgery without etiology. I will contact neurology to determine dizziness specialist at [facility]. Also, she does have some neck pain. MRI cervical spine with mild to moderate OA and DDD with spinal stenosis and possible left nerve root involvement. If no improvement, worsening, new, or changing symptoms, I will consider neurosurgery vs  tertiary center.    · Cervical spine DDD, facet arthropathy, OA- MRI 2/2022 with mild to moderate OA and degenerative changes, minimal spinal stenosis at C5-6, and possible left nerve root involvement of C6 left nerve root.  Patient was not contacted with results.  Consideration of physical therapy versus neurosurgery.  · Back pain- Stable- Continue Tylenol and Flexeril as needed.   · Arthritis-Stable- to continue Tylenol as needed. To see hand surgery or orthopedic surgery if worsening.  · Fecal incontinence- If recurrence of symptoms or other symptoms, consider follow up with GI, the need for MRI lumbosacral region, consideration of further neurological work-up, as well as consideration of pelvic floor strengthening PT.  She should let me know if she is willing to proceed with further work-up.    I spent 40 minutes caring for Virginia Leiva on this date of service, including 15 minutes for AWV and 25 minutes for follow up and problem focussed visit. This time includes time spent by me in the following activities: preparing for the visit, reviewing tests, specialists records, and previous visits, obtaining and/or reviewing a separately obtained history, performing a medically appropriate examination and/or evaluation, counseling and educating the patient/family/caregiver, referring and/or communicating with other health care professionals as necessary, documenting information in the medical record, independently interpreting results and communicating that information with the patient/family/caregiver, and developing a medically appropriate treatment plan with consideration of other conditions, medications, and treatments.

## 2023-03-03 NOTE — PROGRESS NOTES
The ABCs of the Annual Wellness Visit  Initial Medicare Wellness Visit    Subjective     Virginia Leiva is a 67 y.o. female who presents for an Initial Medicare Wellness Visit.    Last Pap- hysterectomy- 2007. No hx abnormal PAP  Last mammogram- ordered 2018- not performed  Last DEXA- has not had  Last colonoscopy- had colonoscopy x 1- no polyp. No FHx colon cancer.   Last Tdap- 7/2015  Prevnar- 1/2019  Pneumovax- has not had  Hepatitis A- 5/2018, 11/2018  Last flu shot- 10/2022  Shingrix- had varicella as a child. Shingrix 7/2021- had 2nd- need date  Covid 19-  Pfizer- 3/2021, 4/2021, 10/2021, 4/2022, moderna bivalent 10/2022    The following portions of the patient's history were reviewed and   updated as appropriate: allergies, current medications, past family history, past medical history, past social history, past surgical history and problem list.     Compared to one year ago, the patient feels her physical   health is the same.    Compared to one year ago, the patient feels her mental   health is the same.    Recent Hospitalizations:  She was not admitted to the hospital during the last year.       Current Medical Providers:  Patient Care Team:  Kacey Hernadez PA as PCP - General (Family Medicine)  Maury Julian DPM as Consulting Physician (Podiatry)    Outpatient Medications Prior to Visit   Medication Sig Dispense Refill   • aspirin 325 MG tablet Take 1 tablet by mouth Daily.     • atorvastatin (LIPITOR) 10 MG tablet TAKE 1 TABLET EVERY NIGHT (NO REFILLS. MUST MAKE APPOINTMENT CALL OFFICE) 90 tablet 1   • cholecalciferol (VITAMIN D3) 25 MCG (1000 UT) tablet      • diphenhydrAMINE-acetaminophen (TYLENOL PM)  MG tablet per tablet Take 1 tablet by mouth At Night As Needed for Sleep.     • FLUoxetine (PROzac) 20 MG capsule TAKE 1 CAPSULE TWICE DAILY 180 capsule 1   • folic acid (FOLVITE) 1 MG tablet Take 1 tablet by mouth Daily.     • LOPERAMIDE HCL PO Take  by mouth As Needed.     • multivitamin  "with minerals tablet tablet Take 1 tablet by mouth 3 (Three) Times a Day.     • vitamin B-12 (CYANOCOBALAMIN) 1000 MCG tablet Take 1 tablet by mouth 2 (Two) Times a Week.     • omeprazole (priLOSEC) 20 MG capsule TAKE 1 CAPSULE EVERY DAY (NEED MD APPOINTMENT FOR REFILLS) 90 capsule 1     No facility-administered medications prior to visit.       No opioid medication identified on active medication list. I have reviewed chart for other potential  high risk medication/s and harmful drug interactions in the elderly.          Aspirin is on active medication list. Aspirin use is indicated based on review of current medical condition/s. Pros and cons of this therapy have been discussed today. Benefits of this medication outweigh potential harm.  Patient has been encouraged to continue taking this medication.  .      Patient Active Problem List   Diagnosis   • GERD (gastroesophageal reflux disease)   • CMC arthritis   • Anxiety   • Esophageal stricture   • Plantar fasciitis   • Vitamin D deficiency   • Other hyperlipidemia   • Family history of GI malignancy   • Macrocytosis   • Bilateral chronic knee pain   • Abnormal EKG   • Digital nerve laceration, finger   • Vertebrobasilar insufficiency   • Dizziness   • Ataxia   • Stress fracture of metatarsal bone of right foot     Advance Care Planning  Advance Directive is on file.  ACP discussion was held with the patient during this visit. Patient has an advance directive in EMR which is still valid.        Objective    Vitals:    03/03/23 0757   BP: 134/80   Pulse: 81   Temp: 97.5 °F (36.4 °C)   SpO2: 98%   Weight: 91.2 kg (201 lb)   Height: 160 cm (62.99\")   PainSc: 0-No pain     Estimated body mass index is 35.61 kg/m² as calculated from the following:    Height as of this encounter: 160 cm (62.99\").    Weight as of this encounter: 91.2 kg (201 lb).    Class 2 Severe Obesity (BMI >=35 and <=39.9). Obesity-related health conditions include the following: dyslipidemias. " Obesity is unchanged. BMI is is above average; BMI management plan is completed. We discussed portion control and increasing exercise.      Does the patient have evidence of cognitive impairment?   No    Lab Results   Component Value Date    CHLPL 156 03/03/2023    TRIG 82 03/03/2023    HDL 63 (H) 03/03/2023    LDL 78 03/03/2023    VLDL 15 03/03/2023        HEALTH RISK ASSESSMENT    Smoking Status:  Social History     Tobacco Use   Smoking Status Never   Smokeless Tobacco Never     Alcohol Consumption:  Social History     Substance and Sexual Activity   Alcohol Use Not Currently     Fall Risk Screen:    STEADI Fall Risk Assessment was completed, and patient is at LOW risk for falls.Assessment completed on:3/3/2023    Depression Screen:   PHQ-2/PHQ-9 Depression Screening 3/3/2023   Little Interest or Pleasure in Doing Things 0-->not at all   Feeling Down, Depressed or Hopeless 0-->not at all   PHQ-9: Brief Depression Severity Measure Score 0       Health Habits and Functional and Cognitive Screening:  Functional & Cognitive Status 3/3/2023   Do you have difficulty preparing food and eating? No   Do you have difficulty bathing yourself, getting dressed or grooming yourself? No   Do you have difficulty using the toilet? No   Do you have difficulty moving around from place to place? No   Do you have trouble with steps or getting out of a bed or a chair? No   Do you need help using the phone?  No   Are you deaf or do you have serious difficulty hearing?  No   Do you need help with transportation? No   Do you need help shopping? No   Do you need help preparing meals?  No   Do you need help with housework?  No   Do you need help with laundry? No   Do you need help taking your medications? Yes   Do you need help managing money? No   Do you ever drive or ride in a car without wearing a seat belt? No   Have you felt unusual stress, anger or loneliness in the last month? No   Who do you live with? Spouse   If you need help, do  you have trouble finding someone available to you? No   Have you been bothered in the last four weeks by sexual problems? No   Do you have difficulty concentrating, remembering or making decisions? No       Age-appropriate Screening Schedule:  Refer to the list below for future screening recommendations based on patient's age, sex and/or medical conditions. Orders for these recommended tests are listed in the plan section. The patient has been provided with a written plan.    Health Maintenance   Topic Date Due   • MAMMOGRAM  Never done   • DXA SCAN  Never done   • HEPATITIS C SCREENING  Never done   • ANNUAL WELLNESS VISIT  Never done   • PAP SMEAR  Never done   • COLORECTAL CANCER SCREENING  08/25/2018   • Pneumococcal Vaccine 65+ (2 - PPSV23 if available, else PCV20) 08/06/2021   • ZOSTER VACCINE (3 of 3) 08/26/2021   • LIPID PANEL  03/03/2024   • TDAP/TD VACCINES (2 - Td or Tdap) 07/08/2025   • COVID-19 Vaccine  Completed   • INFLUENZA VACCINE  Completed          CMS Preventative Services Quick Reference  Risk Factors Identified During Encounter    Immunizations Discussed/Encouraged: pneumovax    The above risks/problems have been discussed with the patient.  Pertinent information has been shared with the patient in the After Visit Summary.  An After Visit Summary and PPPS were made available to the patient.  Diagnoses and all orders for this visit:    1. Medicare annual wellness visit, initial (Primary)    2. Encounter for screening mammogram for malignant neoplasm of breast  -     Mammo screening digital tomosynthesis bilateral w CAD; Future    3. Postmenopausal  -     DEXA Bone Density Axial; Future    4. Colon cancer screening  -     Cologuard - Stool, Per Rectum; Future    5. Other hyperlipidemia  -     Comprehensive Metabolic Panel  -     CK  -     Lipid Panel With LDL / HDL Ratio  -     Urinalysis With Culture If Indicated -    6. Vitamin D deficiency  -     Comprehensive Metabolic Panel  -     Vitamin  D,25-Hydroxy  -     Urinalysis With Culture If Indicated -    7. Macrocytosis  -     CBC & Differential  -     Vitamin B12 & Folate  -     Urinalysis With Culture If Indicated -    8. B12 deficiency  -     CBC & Differential  -     Vitamin B12 & Folate  -     Urinalysis With Culture If Indicated -    9. Gastroesophageal reflux disease, unspecified whether esophagitis present    10. Esophageal stricture    11. Depression with anxiety    12. Dizziness  -     Urinalysis With Culture If Indicated -    13. Tinnitus, unspecified laterality    14. Chronic bilateral low back pain with sciatica, sciatica laterality unspecified    15. Neck pain on left side    16. Degenerative disc disease, cervical    17. Facet arthropathy, cervical    18. Atherosclerosis of both carotid arteries    19. Arthritis    20. Bilateral chronic knee pain    Other orders  -     Microscopic Examination -  -     Urine culture, Comprehensive - ,      Follow Up:  Next Medicare Wellness visit to be scheduled in 1 year.

## 2023-03-07 LAB
25(OH)D3+25(OH)D2 SERPL-MCNC: 27.9 NG/ML (ref 30–100)
ALBUMIN SERPL-MCNC: 4.6 G/DL (ref 3.5–5.2)
ALBUMIN/GLOB SERPL: 2 G/DL
ALP SERPL-CCNC: 96 U/L (ref 39–117)
ALT SERPL-CCNC: 22 U/L (ref 1–33)
APPEARANCE UR: ABNORMAL
AST SERPL-CCNC: 20 U/L (ref 1–32)
BACTERIA #/AREA URNS HPF: ABNORMAL /HPF
BACTERIA UR CULT: ABNORMAL
BACTERIA UR CULT: ABNORMAL
BASOPHILS # BLD AUTO: 0.06 10*3/MM3 (ref 0–0.2)
BASOPHILS NFR BLD AUTO: 1 % (ref 0–1.5)
BILIRUB SERPL-MCNC: 0.5 MG/DL (ref 0–1.2)
BILIRUB UR QL STRIP: NEGATIVE
BUN SERPL-MCNC: 14 MG/DL (ref 8–23)
BUN/CREAT SERPL: 17.1 (ref 7–25)
CALCIUM SERPL-MCNC: 9.5 MG/DL (ref 8.6–10.5)
CASTS URNS QL MICRO: ABNORMAL /LPF
CHLORIDE SERPL-SCNC: 107 MMOL/L (ref 98–107)
CHOLEST SERPL-MCNC: 156 MG/DL (ref 0–200)
CK SERPL-CCNC: 77 U/L (ref 20–180)
CO2 SERPL-SCNC: 28.4 MMOL/L (ref 22–29)
COLOR UR: YELLOW
CREAT SERPL-MCNC: 0.82 MG/DL (ref 0.57–1)
EGFRCR SERPLBLD CKD-EPI 2021: 79 ML/MIN/1.73
EOSINOPHIL # BLD AUTO: 0.16 10*3/MM3 (ref 0–0.4)
EOSINOPHIL NFR BLD AUTO: 2.8 % (ref 0.3–6.2)
EPI CELLS #/AREA URNS HPF: >10 /HPF (ref 0–10)
ERYTHROCYTE [DISTWIDTH] IN BLOOD BY AUTOMATED COUNT: 12.4 % (ref 12.3–15.4)
FOLATE SERPL-MCNC: >20 NG/ML (ref 4.78–24.2)
GLOBULIN SER CALC-MCNC: 2.3 GM/DL
GLUCOSE SERPL-MCNC: 97 MG/DL (ref 65–99)
GLUCOSE UR QL STRIP: NEGATIVE
HCT VFR BLD AUTO: 42 % (ref 34–46.6)
HDLC SERPL-MCNC: 63 MG/DL (ref 40–60)
HGB BLD-MCNC: 13.5 G/DL (ref 12–15.9)
HGB UR QL STRIP: ABNORMAL
IMM GRANULOCYTES # BLD AUTO: 0.02 10*3/MM3 (ref 0–0.05)
IMM GRANULOCYTES NFR BLD AUTO: 0.3 % (ref 0–0.5)
KETONES UR QL STRIP: NEGATIVE
LDLC SERPL CALC-MCNC: 78 MG/DL (ref 0–100)
LDLC/HDLC SERPL: 1.22 {RATIO}
LEUKOCYTE ESTERASE UR QL STRIP: ABNORMAL
LYMPHOCYTES # BLD AUTO: 1.58 10*3/MM3 (ref 0.7–3.1)
LYMPHOCYTES NFR BLD AUTO: 27.5 % (ref 19.6–45.3)
MCH RBC QN AUTO: 30.1 PG (ref 26.6–33)
MCHC RBC AUTO-ENTMCNC: 32.1 G/DL (ref 31.5–35.7)
MCV RBC AUTO: 93.5 FL (ref 79–97)
MICRO URNS: ABNORMAL
MONOCYTES # BLD AUTO: 0.37 10*3/MM3 (ref 0.1–0.9)
MONOCYTES NFR BLD AUTO: 6.4 % (ref 5–12)
NEUTROPHILS # BLD AUTO: 3.56 10*3/MM3 (ref 1.7–7)
NEUTROPHILS NFR BLD AUTO: 62 % (ref 42.7–76)
NITRITE UR QL STRIP: POSITIVE
NRBC BLD AUTO-RTO: 0 /100 WBC (ref 0–0.2)
OTHER ANTIBIOTIC SUSC ISLT: ABNORMAL
PH UR STRIP: 7.5 [PH] (ref 5–7.5)
PLATELET # BLD AUTO: 182 10*3/MM3 (ref 140–450)
POTASSIUM SERPL-SCNC: 4.5 MMOL/L (ref 3.5–5.2)
PROT SERPL-MCNC: 6.9 G/DL (ref 6–8.5)
PROT UR QL STRIP: ABNORMAL
RBC # BLD AUTO: 4.49 10*6/MM3 (ref 3.77–5.28)
RBC #/AREA URNS HPF: >30 /HPF (ref 0–2)
SODIUM SERPL-SCNC: 142 MMOL/L (ref 136–145)
SP GR UR STRIP: 1.02 (ref 1–1.03)
TRIGL SERPL-MCNC: 82 MG/DL (ref 0–150)
URINALYSIS REFLEX: ABNORMAL
UROBILINOGEN UR STRIP-MCNC: 0.2 MG/DL (ref 0.2–1)
VIT B12 SERPL-MCNC: 565 PG/ML (ref 211–946)
VLDLC SERPL CALC-MCNC: 15 MG/DL (ref 5–40)
WBC # BLD AUTO: 5.75 10*3/MM3 (ref 3.4–10.8)
WBC #/AREA URNS HPF: ABNORMAL /HPF (ref 0–5)

## 2023-03-08 DIAGNOSIS — K22.2 ESOPHAGEAL STRICTURE: ICD-10-CM

## 2023-03-08 DIAGNOSIS — K21.9 GASTROESOPHAGEAL REFLUX DISEASE: ICD-10-CM

## 2023-03-08 RX ORDER — OMEPRAZOLE 20 MG/1
CAPSULE, DELAYED RELEASE ORAL
Qty: 90 CAPSULE | Refills: 1 | Status: SHIPPED | OUTPATIENT
Start: 2023-03-08

## 2023-03-14 RX ORDER — SULFAMETHOXAZOLE AND TRIMETHOPRIM 800; 160 MG/1; MG/1
1 TABLET ORAL 2 TIMES DAILY
Qty: 14 TABLET | Refills: 0 | Status: SHIPPED | OUTPATIENT
Start: 2023-03-14

## 2023-04-06 DIAGNOSIS — K22.2 ESOPHAGEAL STRICTURE: ICD-10-CM

## 2023-04-06 DIAGNOSIS — K21.9 GASTROESOPHAGEAL REFLUX DISEASE: ICD-10-CM

## 2023-04-06 RX ORDER — FLUOXETINE HYDROCHLORIDE 20 MG/1
CAPSULE ORAL
Qty: 180 CAPSULE | Refills: 1 | Status: SHIPPED | OUTPATIENT
Start: 2023-04-06

## 2023-08-07 DIAGNOSIS — K21.9 GASTROESOPHAGEAL REFLUX DISEASE: ICD-10-CM

## 2023-08-07 DIAGNOSIS — K22.2 ESOPHAGEAL STRICTURE: ICD-10-CM

## 2023-08-07 RX ORDER — OMEPRAZOLE 20 MG/1
CAPSULE, DELAYED RELEASE ORAL
Qty: 90 CAPSULE | Refills: 0 | Status: SHIPPED | OUTPATIENT
Start: 2023-08-07

## 2023-08-07 NOTE — TELEPHONE ENCOUNTER
Please see lab result note.  Patient was to be scheduled with me in 6 months from 3/2023.  Please schedule follow-up with me in September, fasting

## 2023-08-22 ENCOUNTER — TELEPHONE (OUTPATIENT)
Dept: FAMILY MEDICINE CLINIC | Facility: CLINIC | Age: 68
End: 2023-08-22

## 2023-08-22 NOTE — TELEPHONE ENCOUNTER
Left message for patient to call , ok for hub to read    Patient should watch for signs and symptoms of covid. If patient has sx she can schedule an appointment to be tested.

## 2023-08-22 NOTE — TELEPHONE ENCOUNTER
PATIENT CALLED AND STATES SHE HAS BEEN COVID EXPOSED TODAY.  PLEASE CALL AND ADVISE OF WHAT SHE NEEDS TO DO  [phone]

## 2023-09-29 ENCOUNTER — OFFICE VISIT (OUTPATIENT)
Dept: FAMILY MEDICINE CLINIC | Facility: CLINIC | Age: 68
End: 2023-09-29
Payer: MEDICARE

## 2023-09-29 VITALS
RESPIRATION RATE: 20 BRPM | WEIGHT: 202 LBS | BODY MASS INDEX: 35.79 KG/M2 | HEART RATE: 68 BPM | HEIGHT: 63 IN | DIASTOLIC BLOOD PRESSURE: 82 MMHG | OXYGEN SATURATION: 96 % | SYSTOLIC BLOOD PRESSURE: 140 MMHG | TEMPERATURE: 98.3 F

## 2023-09-29 DIAGNOSIS — K21.00 GASTROESOPHAGEAL REFLUX DISEASE WITH ESOPHAGITIS WITHOUT HEMORRHAGE: ICD-10-CM

## 2023-09-29 DIAGNOSIS — E55.9 VITAMIN D DEFICIENCY: ICD-10-CM

## 2023-09-29 DIAGNOSIS — R42 DIZZINESS: ICD-10-CM

## 2023-09-29 DIAGNOSIS — M25.562 BILATERAL CHRONIC KNEE PAIN: ICD-10-CM

## 2023-09-29 DIAGNOSIS — M50.30 DEGENERATIVE DISC DISEASE, CERVICAL: ICD-10-CM

## 2023-09-29 DIAGNOSIS — M51.36 DEGENERATIVE DISC DISEASE, LUMBAR: ICD-10-CM

## 2023-09-29 DIAGNOSIS — G89.29 CHRONIC BILATERAL LOW BACK PAIN WITH SCIATICA, SCIATICA LATERALITY UNSPECIFIED: ICD-10-CM

## 2023-09-29 DIAGNOSIS — D75.89 MACROCYTOSIS: ICD-10-CM

## 2023-09-29 DIAGNOSIS — E53.8 B12 DEFICIENCY: ICD-10-CM

## 2023-09-29 DIAGNOSIS — M25.561 BILATERAL CHRONIC KNEE PAIN: ICD-10-CM

## 2023-09-29 DIAGNOSIS — M54.2 NECK PAIN ON LEFT SIDE: ICD-10-CM

## 2023-09-29 DIAGNOSIS — E78.49 OTHER HYPERLIPIDEMIA: Primary | ICD-10-CM

## 2023-09-29 DIAGNOSIS — H93.19 TINNITUS, UNSPECIFIED LATERALITY: ICD-10-CM

## 2023-09-29 DIAGNOSIS — Z11.59 ENCOUNTER FOR HEPATITIS C SCREENING TEST FOR LOW RISK PATIENT: ICD-10-CM

## 2023-09-29 DIAGNOSIS — F41.8 DEPRESSION WITH ANXIETY: ICD-10-CM

## 2023-09-29 DIAGNOSIS — N20.0 KIDNEY STONES: ICD-10-CM

## 2023-09-29 DIAGNOSIS — I65.23 ATHEROSCLEROSIS OF BOTH CAROTID ARTERIES: ICD-10-CM

## 2023-09-29 DIAGNOSIS — M19.90 ARTHRITIS: ICD-10-CM

## 2023-09-29 DIAGNOSIS — M54.40 CHRONIC BILATERAL LOW BACK PAIN WITH SCIATICA, SCIATICA LATERALITY UNSPECIFIED: ICD-10-CM

## 2023-09-29 DIAGNOSIS — M47.812 FACET ARTHROPATHY, CERVICAL: ICD-10-CM

## 2023-09-29 DIAGNOSIS — G89.29 BILATERAL CHRONIC KNEE PAIN: ICD-10-CM

## 2023-09-29 PROBLEM — M51.369 DEGENERATIVE DISC DISEASE, LUMBAR: Status: ACTIVE | Noted: 2023-09-29

## 2023-09-29 PROBLEM — M54.42 CHRONIC BILATERAL LOW BACK PAIN WITH SCIATICA: Status: ACTIVE | Noted: 2023-09-29

## 2023-09-29 PROBLEM — M54.41 CHRONIC BILATERAL LOW BACK PAIN WITH SCIATICA: Status: ACTIVE | Noted: 2023-09-29

## 2023-09-29 PROCEDURE — 99214 OFFICE O/P EST MOD 30 MIN: CPT | Performed by: PHYSICIAN ASSISTANT

## 2023-09-29 NOTE — PROGRESS NOTES
Subjective   Virginia Leiva is a 68 y.o. female who presents today in follow-up of hyperlipidemia, vitamin D and B12 deficiencies, macrocytosis, GERD, moods, dizziness, back pain, arthritis, and history of fecal incontenence.    Hyperlipidemia  Pertinent negatives include no shortness of breath.   Anxiety  Symptoms include dizziness. Patient reports no shortness of breath.       Hyperlipidemia-has done well with starting atorvastatin and is tolerating without AE.  Vitamin D-taking 2000 IU once daily.  Advised to increase vitamin D to 2000 IU daily 3/2023.  Macrocytosis, B12-taking B12 500 mcg once weekly.  Now on folic acid instead of MTV.     Kidney stone- went to ER for kidney stone, hydronephrosis, diverticulosis, abdominal pain,  diaphragmatic hernia. Not sure but thinks could have passed. Follow up with urology-   Patient went to ER 4/222/2023 for flank pain, nausea, and vomiting. Called urology- start antibiotics and follow up outpatient. Given Norco, Zofran, Keflex given in the hospital and discharged on Keflex 500 mg 4 times daily for 5 days.  Patient asked that medication be changed to Toradol-MB declined in association of patient's age.  Urine sample contaminated with stool.  CT abd/pelvis-multiple tiny bilateral nonobstructing renal stones.  3 mm right UPJ stone with mild to moderate right-sided hydronephrosis.  Extensive sigmoid diverticulosis without diverticulitis.  Fat-containing left inguinal hernia.  Mild to moderate degenerative changes lumbar spine.  UA- protein, hematuria, leuk est, and 4+ bacteria.   Creatinine 1.21  Seen by Dr Chaudhari    GERD-has been stable on Prilosec without breakthrough symptoms. If misses a dose, she feels it immediately.   She had an EGD 5/2018 without stricture or need for dilation (history of stricture in the past).  She was noted to have medium/moderate hiatal hernia and mucosa was suggestive of eosinophilic esophagitis with negative biopsies for eosinophils.  He  "recommended repeat EGD in 3 to 5 years.    Moods- increased to Prozac 20 mg twice daily- has been doing ok.   Prozac 20 mg once daily- tolerated without AE but felt she was snappy and crying easily. She was unsure it was working as well. Not sure if it was related to dizziness or her daughter's stressors or 's issues. She was still working from home 2 days a week and at work 3 days.     Dizziness/tinnitus- neurology was unable to find etiology of dizziness.  She continues with dizziness. Left top of head and parietal area. She wakes up with a headache- mostly on the left but some on the right as well. She notices that when she leans her head back and keeps it steady, she has improvement in dizziness. No neck pain, numbness or tingling in arms. Feels like \"when you sleep too much and wake up\"- feels that way all the time. At night, not moving much. Does not wake up but when she gets up, she feels weird. Affects the way she feels about things.   Dizziness started 2/2020. When she gets up, she feels like her eyes are twitching and very occasionally feels some spinning. Symptoms occur seldom when she was driving down the road, and she got nervous to focus and see. No consistent dizziness with head turning and no dizziness with laying down. She has had ringing in her ears for years with normal hearing and no ear pain. If she lays on 1 side too long, she has a headache- worse on the left. She denies confusion, trouble speaking, trouble using arms/ legs, and blurred vision. She has had numbness in hands intermittent but no other numbness or neurological symptoms.   Last opt/ophthalmology was > 1 year ago. She was advised to go for annual eye exam.  I referred for MRI brain and IAC with opacified hypoplastic right sphenoid sinus. She was treated for sinusitis and advised we can send to ENT.   Carotid duplex with bilateral plaque without stenosis.    ENT- Dr Lozano- he did ENG and testing. Told slight hearing loss but " no other etiology of symtpoms.   Neurosurgery-     Dizziness, unsteady gait, neck pain, DDD/OA/facet arthropathy cervical spine- no numbness, tingling or weakness in arm or hand    Back pain-every once in a while has something but if takes a muscle relaxer x 1, is ok.   No worsening, changing, new or different symptoms. She has not had any increased symptoms.  Patient uses Flexeril only occasionally with back pain. She was told it was arthritis at some point.   History of Fecal incontinence- no recurrence. Doing ok.   She noted change in BM 8/2018 with increased fecal urgency and incontinence.  She also had stress fecal incontinence.  She had significant hemorrhoids on exam and possible mild rectal prolapse as well with decreased rectal tone.  Patient wanted to start with follow-up with Dr. Covarrubias but did not schedule follow-up with him.  She reports symptoms improved.    I discussed the need for follow-up with GI, MRI lumbosacral region, and consideration of further neurological work-up as well as consideration of pelvic floor strengthening PT.  She did not want to proceed with further work-up at that time and has had no recurrence of the symptoms    Arthritis-She uses Tylenol as needed.  She had right MCP surgery 10/2018 and left MCP surgery 12/2018 and healed well.    She also has chronic left knee pain, intermittent feels like it going to give out. She has been seen by orthopedic surgery with arthritis of her knees.      Skin discoloration, axillary abnormality- has not changed but continues. Not bothersome. Fading but not gone. She does not want to see dermatology.   Has discoloration of skin left axilla- now spreading- was read and now not as red. No pain or itching. It was really read and now faded but has spread in size.     Patient's Specialists:  Cardiology- Dr South- last appt 9/2018 for preop cleareance. EKG thought to be negative. Hyperlipidemia- advised low dose statin and risk reduction. Follow up  PRN.   GI-Dr Covarrubias- last EGD 5/2018 medium hiatal hernia. Repeat in 3-5 years.   Hand surgery- Dr Tejada- last appt 1/2021 in follow up of radial digital nerve laceration left index finger with repair with vein graft 12/2020. Follow up in 3 months.   Prior 1/2019 for left thumb carpometacarpal arthoplasty with LRTI and left de Quervain's release.   Neurology- Dr Carroll- last appt 8/2021 for dizziness, vertebrobasilar insufficiency, intracranial cerebrovascular stenosis.  Increase aspirin to 325 mg daily.  Referred to neurosurgery for possible arteriogram to determine outpouching as infundibulum versus aneurysm.  Advised him sleep study.  Follow-up after sleep study.   Neurosurgery-Dr. Quiñones- no vertebrobasilar stenosis or vertebral artery narrowing bilaterally.  No vascular explanation for symptoms.  Follow-up as needed.  Orthopedic surgery- Dr Lee Roberto- last appt 8/2017 for patellofemoral arthritis left knee. Received injection. Advied try tumeric or glucosamine. Follow up PRN.   ENT-Dr. Severtson-last appt 3/2022 for generalized disequilibrium, dizziness, tinnitus, and allergic rhinitis.  Advised increased activity level, RADHA through [facility], avoid caffeine, nicotine, chocolate, and salt.  CBT versus TCA for tinnitus.  Consider hearing aid evaluation.  Prior Dr Lozano- . Treated a second time for blocked sphenoid sinus.   Podiatry- Dr Julian- last appt 7/2020 for 5th metatarsal fracture. Advised MELONIE boot.     The following portions of the patient's history were reviewed and updated as appropriate: allergies, current medications, past family history, past medical history, past social history, past surgical history and problem list.    Review of Systems   Constitutional: Negative.  Negative for fever.   HENT:  Positive for tinnitus. Negative for congestion, ear pain, postnasal drip and sore throat.    Respiratory: Negative.  Negative for shortness of breath.    Cardiovascular: Negative.     Gastrointestinal: Negative.    Genitourinary: Negative.    Musculoskeletal:  Positive for arthralgias, back pain, gait problem, neck pain and neck stiffness.   Skin:  Positive for rash.   Neurological:  Positive for dizziness.   Psychiatric/Behavioral:  Dysphoric mood: improving.      Objective    Vitals:    09/29/23 1500   BP: 140/82   Pulse: 68   Resp: 20   Temp: 98.3 °F (36.8 °C)   SpO2: 96%     Body mass index is 35.79 kg/m².    Physical Exam   Constitutional: She is oriented to person, place, and time. She appears well-developed. No distress.   HENT:   Head: Normocephalic and atraumatic.   Right Ear: External ear normal.   Left Ear: External ear normal.   Nose: Nose normal.   Eyes: Conjunctivae and lids are normal. Right eye exhibits no discharge. Left eye exhibits no discharge.   Neck: Carotid bruit is not present.   Cardiovascular: Normal rate, regular rhythm, normal heart sounds and normal pulses. Exam reveals no gallop and no friction rub.   No murmur heard.  Pulmonary/Chest: Effort normal and breath sounds normal. No respiratory distress. She has no wheezes. She has no rhonchi. She has no rales.   Abdominal: Normal appearance.   Musculoskeletal: No deformity.   Neurological: She is alert and oriented to person, place, and time. Gait normal.   Skin: Skin is warm and dry.        Psychiatric: Her speech is normal and behavior is normal. Mood, memory, affect, judgment and thought content normal. She is attentive.   Nursing note and vitals reviewed.    Assessment & Plan   Diagnoses and all orders for this visit:    1. Other hyperlipidemia (Primary)  -     Comprehensive Metabolic Panel  -     CK  -     Lipid Panel With LDL / HDL Ratio    2. Vitamin D deficiency  -     Comprehensive Metabolic Panel  -     Vitamin D,25-Hydroxy    3. Macrocytosis  -     CBC & Differential  -     Vitamin B12 & Folate    4. B12 deficiency  -     CBC & Differential  -     Vitamin B12 & Folate    5. Kidney stones  -     Comprehensive  Metabolic Panel    6. Gastroesophageal reflux disease with esophagitis without hemorrhage    7. Depression with anxiety    8. Atherosclerosis of both carotid arteries    9. Dizziness    10. Tinnitus, unspecified laterality    11. Chronic bilateral low back pain with sciatica, sciatica laterality unspecified    12. Degenerative disc disease, lumbar    13. Neck pain on left side    14. Degenerative disc disease, cervical    15. Facet arthropathy, cervical    16. Arthritis    17. Bilateral chronic knee pain    18. Encounter for hepatitis C screening test for low risk patient  -     Hepatitis C antibody         Assessment and plan  AWV completed today. She is overdue for mammogram, DEXA, and colon cancer screening. She declines colonoscopy- I will place order for Cologuard. She should contact insurance to determine coverage and location of coverage for Pneumovax. We need to obtain records/ date for 2nd Shingrix vaccination.     Patient will have fasting labs. Call if no results in 1 week. Stability of conditions, plan, follow up, and further recommendations pending labs.  If stable, follow-up in 6 months.    Hyperlipidemia-Last lipids were stable. Continue atorvastatin 10 mg at bedtime.  Further treatment recommendations pending labs.     Vitamin D deficiency-Continue vitamin D 1000 IU daily. Dosing recommendations following labs.   B12 deficiency-Continue B12 500 mcg daily. Await labs for further recommendations.    GERD-Controlled symptoms. Continue Prilosec 20 mg once daily. Patient needs to schedule repeat EGD- was due 5/2021.  Depression with anxiety- Moods were uncontrolled with increased stressors and irritability. She has had improvement with Prozac 20 mg twice daily. To be seen ASAP if worsening moods or AE with medication.   Dizziness/ Tinnitus- I referred for MRI brain and IAC as well as carotid duplex with some sinus disease but otherwise negative. CTA head and neck to ensure no cerbrovascular etiology of  symptoms and advised BPPV physical therapy/ exercises. She was seen by ENT who thought there could be a vascular etiology. They ordered VNG- no etiology of symptoms. She was seen by neurology who also thought some cerebrovascular disease and advised Aspirin 325 mg daily and sleep study. She was seen by neurosurgery without etiology. I will contact neurology to determine dizziness specialist at [facility]. Also, she does have some neck pain. MRI cervical spine with mild to moderate OA and DDD with spinal stenosis and possible left nerve root involvement. If no improvement, worsening, new, or changing symptoms, I will consider neurosurgery vs tertiary center.    Cervical spine DDD, facet arthropathy, OA- MRI 2/2022 with mild to moderate OA and degenerative changes, minimal spinal stenosis at C5-6, and possible left nerve root involvement of C6 left nerve root.  Patient was not contacted with results.  Consideration of physical therapy versus neurosurgery.  Back pain- Stable- Continue Tylenol and Flexeril as needed.   Arthritis-Stable- to continue Tylenol as needed. To see hand surgery or orthopedic surgery if worsening.  Fecal incontinence- If recurrence of symptoms or other symptoms, consider follow up with GI, the need for MRI lumbosacral region, consideration of further neurological work-up, as well as consideration of pelvic floor strengthening PT.  She should let me know if she is willing to proceed with further work-up.    I spent 40 minutes caring for Virginia Leiva on this date of service, including 15 minutes for AWV and 25 minutes for follow up and problem focussed visit. This time includes time spent by me in the following activities: preparing for the visit, reviewing tests, specialists records, and previous visits, obtaining and/or reviewing a separately obtained history, performing a medically appropriate examination and/or evaluation, counseling and educating the patient/family/caregiver, referring and/or  communicating with other health care professionals as necessary, documenting information in the medical record, independently interpreting results and communicating that information with the patient/family/caregiver, and developing a medically appropriate treatment plan with consideration of other conditions, medications, and treatments.

## 2023-10-03 LAB
25(OH)D3+25(OH)D2 SERPL-MCNC: 34.8 NG/ML (ref 30–100)
ALBUMIN SERPL-MCNC: 4.8 G/DL (ref 3.5–5.2)
ALBUMIN/GLOB SERPL: 2.7 G/DL
ALP SERPL-CCNC: 86 U/L (ref 39–117)
ALT SERPL-CCNC: 24 U/L (ref 1–33)
AST SERPL-CCNC: 24 U/L (ref 1–32)
BASOPHILS # BLD AUTO: 0.07 10*3/MM3 (ref 0–0.2)
BASOPHILS NFR BLD AUTO: 0.9 % (ref 0–1.5)
BILIRUB SERPL-MCNC: 0.8 MG/DL (ref 0–1.2)
BUN SERPL-MCNC: 13 MG/DL (ref 8–23)
BUN/CREAT SERPL: 12.1 (ref 7–25)
CALCIUM SERPL-MCNC: 9.8 MG/DL (ref 8.6–10.5)
CHLORIDE SERPL-SCNC: 105 MMOL/L (ref 98–107)
CHOLEST SERPL-MCNC: 183 MG/DL (ref 0–200)
CK SERPL-CCNC: 112 U/L (ref 20–180)
CO2 SERPL-SCNC: 24.5 MMOL/L (ref 22–29)
CREAT SERPL-MCNC: 1.07 MG/DL (ref 0.57–1)
EGFRCR SERPLBLD CKD-EPI 2021: 57 ML/MIN/1.73
EOSINOPHIL # BLD AUTO: 0.22 10*3/MM3 (ref 0–0.4)
EOSINOPHIL NFR BLD AUTO: 2.9 % (ref 0.3–6.2)
ERYTHROCYTE [DISTWIDTH] IN BLOOD BY AUTOMATED COUNT: 12.9 % (ref 12.3–15.4)
FOLATE SERPL-MCNC: >20 NG/ML (ref 4.78–24.2)
GLOBULIN SER CALC-MCNC: 1.8 GM/DL
GLUCOSE SERPL-MCNC: 121 MG/DL (ref 65–99)
HCT VFR BLD AUTO: 42.7 % (ref 34–46.6)
HCV IGG SERPL QL IA: NON REACTIVE
HDLC SERPL-MCNC: 67 MG/DL (ref 40–60)
HGB BLD-MCNC: 14.3 G/DL (ref 12–15.9)
IMM GRANULOCYTES # BLD AUTO: 0.03 10*3/MM3 (ref 0–0.05)
IMM GRANULOCYTES NFR BLD AUTO: 0.4 % (ref 0–0.5)
LDLC SERPL CALC-MCNC: 97 MG/DL (ref 0–100)
LDLC/HDLC SERPL: 1.42 {RATIO}
LYMPHOCYTES # BLD AUTO: 2.06 10*3/MM3 (ref 0.7–3.1)
LYMPHOCYTES NFR BLD AUTO: 27.4 % (ref 19.6–45.3)
MCH RBC QN AUTO: 31 PG (ref 26.6–33)
MCHC RBC AUTO-ENTMCNC: 33.5 G/DL (ref 31.5–35.7)
MCV RBC AUTO: 92.6 FL (ref 79–97)
MONOCYTES # BLD AUTO: 0.4 10*3/MM3 (ref 0.1–0.9)
MONOCYTES NFR BLD AUTO: 5.3 % (ref 5–12)
NEUTROPHILS # BLD AUTO: 4.75 10*3/MM3 (ref 1.7–7)
NEUTROPHILS NFR BLD AUTO: 63.1 % (ref 42.7–76)
NRBC BLD AUTO-RTO: 0 /100 WBC (ref 0–0.2)
PLATELET # BLD AUTO: 193 10*3/MM3 (ref 140–450)
POTASSIUM SERPL-SCNC: 4.3 MMOL/L (ref 3.5–5.2)
PROT SERPL-MCNC: 6.6 G/DL (ref 6–8.5)
RBC # BLD AUTO: 4.61 10*6/MM3 (ref 3.77–5.28)
SODIUM SERPL-SCNC: 142 MMOL/L (ref 136–145)
TRIGL SERPL-MCNC: 104 MG/DL (ref 0–150)
VIT B12 SERPL-MCNC: 561 PG/ML (ref 211–946)
VLDLC SERPL CALC-MCNC: 19 MG/DL (ref 5–40)
WBC # BLD AUTO: 7.53 10*3/MM3 (ref 3.4–10.8)

## 2023-10-13 ENCOUNTER — OFFICE VISIT (OUTPATIENT)
Dept: FAMILY MEDICINE CLINIC | Facility: CLINIC | Age: 68
End: 2023-10-13
Payer: MEDICARE

## 2023-10-13 VITALS
RESPIRATION RATE: 16 BRPM | TEMPERATURE: 98.3 F | BODY MASS INDEX: 35.44 KG/M2 | HEART RATE: 76 BPM | DIASTOLIC BLOOD PRESSURE: 74 MMHG | SYSTOLIC BLOOD PRESSURE: 122 MMHG | HEIGHT: 63 IN | OXYGEN SATURATION: 98 % | WEIGHT: 200 LBS

## 2023-10-13 DIAGNOSIS — H00.015 HORDEOLUM EXTERNUM LEFT LOWER EYELID: ICD-10-CM

## 2023-10-13 DIAGNOSIS — M72.2 PLANTAR FASCIITIS: Primary | ICD-10-CM

## 2023-10-13 PROCEDURE — 99213 OFFICE O/P EST LOW 20 MIN: CPT | Performed by: PHYSICIAN ASSISTANT

## 2023-10-13 PROCEDURE — 1159F MED LIST DOCD IN RCRD: CPT | Performed by: PHYSICIAN ASSISTANT

## 2023-10-13 PROCEDURE — 1160F RVW MEDS BY RX/DR IN RCRD: CPT | Performed by: PHYSICIAN ASSISTANT

## 2023-10-13 RX ORDER — ERYTHROMYCIN 5 MG/G
OINTMENT OPHTHALMIC NIGHTLY
Qty: 3.5 G | Refills: 0 | Status: SHIPPED | OUTPATIENT
Start: 2023-10-13

## 2023-10-13 NOTE — PROGRESS NOTES
Subjective   Virginia Leiva is a 68 y.o. female who presents today for evaluation of eye pain and swelling left eyelid. She also has foot pain/ plantar fasciitis and is having trouble getting in to podiatry    History of Present Illness     She has couple day history of redness and swelling of left lower lid. It was larger but continues with redness and swelling.     She is having pain in her feet- worse with 1st steps. Worse when she gets up in the night to go to the bathroom. She has been trying to call her 's podiatrist for evaluation of plantar fasciitis but is unable to reach them for appt.     The following portions of the patient's history were reviewed and updated as appropriate: allergies, current medications, past family history, past medical history, past social history, past surgical history and problem list.    Review of Systems    Objective   Vitals:    10/13/23 1545   BP: 122/74   Pulse: 76   Resp: 16   Temp: 98.3 °F (36.8 °C)   SpO2: 98%     Body mass index is 35.44 kg/m².    Physical Exam    Assessment & Plan   Diagnoses and all orders for this visit:    1. Plantar fasciitis (Primary)  -     Ambulatory Referral to Podiatry    2. Hordeolum externum left lower eyelid  -     erythromycin (ROMYCIN) 5 MG/GM ophthalmic ointment; Administer  into the left eye Every Night.  Dispense: 3.5 g; Refill: 0        Assessment and Plan  She is due for follow up with me 1/2024 for 3 month follow up from labs from 10/2/2023. Can have fasting labs at appt or prior to appt.     Hordeolum left lower lid- She has stye on her eye. Continue warm compresses and I will give Erythromycin ointment for daily use. To be seen here or eye provider if no resolution, worsening, new, or changing symptoms.   Plantar fasciitis- Patient has likely plantar fasciitis. I will refer to podiatry for evaluation and treatment.     I spent 20 minutes caring for Virginia Leiva on this date of service. This time includes time spent by me in  the following activities as necessary: preparing for the visit, reviewing tests, specialists records and previous visits, obtaining and/or reviewing a separately obtained history, performing a medically appropriate exam and/or evaluation, counseling and educating the patient, family, caregiver, referring and/or communicating with other healthcare professionals, documenting information in the medical record, independently interpreting results and communicating that information with the patient, family, caregiver, and developing a medically appropriate treatment plan with consideration of other conditions, medications, and treatments.

## 2023-11-20 ENCOUNTER — OFFICE VISIT (OUTPATIENT)
Dept: FAMILY MEDICINE CLINIC | Facility: CLINIC | Age: 68
End: 2023-11-20
Payer: MEDICARE

## 2023-11-20 VITALS
TEMPERATURE: 97 F | DIASTOLIC BLOOD PRESSURE: 82 MMHG | BODY MASS INDEX: 35.61 KG/M2 | RESPIRATION RATE: 18 BRPM | SYSTOLIC BLOOD PRESSURE: 118 MMHG | HEART RATE: 92 BPM | WEIGHT: 201 LBS | OXYGEN SATURATION: 92 % | HEIGHT: 63 IN

## 2023-11-20 DIAGNOSIS — M54.50 ACUTE LEFT-SIDED LOW BACK PAIN WITHOUT SCIATICA: Primary | ICD-10-CM

## 2023-11-20 DIAGNOSIS — R82.90 UNSPECIFIED ABNORMAL FINDINGS IN URINE: ICD-10-CM

## 2023-11-20 DIAGNOSIS — R73.01 ELEVATED FASTING GLUCOSE: ICD-10-CM

## 2023-11-20 LAB
EXPIRATION DATE: NORMAL
HBA1C MFR BLD: 5.6 % (ref 4.5–5.7)
Lab: NORMAL

## 2023-11-20 RX ORDER — METHYLPREDNISOLONE 4 MG/1
TABLET ORAL
Qty: 21 TABLET | Refills: 0 | Status: SHIPPED | OUTPATIENT
Start: 2023-11-20

## 2023-11-20 RX ORDER — CYCLOBENZAPRINE HCL 10 MG
10 TABLET ORAL 3 TIMES DAILY PRN
Qty: 30 TABLET | Refills: 0 | Status: SHIPPED | OUTPATIENT
Start: 2023-11-20

## 2023-11-20 RX ORDER — KETOROLAC TROMETHAMINE 30 MG/ML
30 INJECTION, SOLUTION INTRAMUSCULAR; INTRAVENOUS ONCE
Status: COMPLETED | OUTPATIENT
Start: 2023-11-20 | End: 2023-11-20

## 2023-11-20 RX ADMIN — KETOROLAC TROMETHAMINE 30 MG: 30 INJECTION, SOLUTION INTRAMUSCULAR; INTRAVENOUS at 10:44

## 2023-11-20 NOTE — PROGRESS NOTES
Subjective   Virginia Leiva is a 68 y.o. female who presents today for evaluation of worsening back pain with history of back pain, arthritis, history of fecal incontinence, hyperlipidemia, vitamin D and B12 deficiencies, macrocytosis, GERD, moods, and dizziness.    Back Pain  Pertinent negatives include no fever.     Back pain-every once in a while has something but if takes a muscle relaxer x 1, is ok.   No worsening, changing, new or different symptoms. She has not had any increased symptoms.  Patient uses Flexeril only occasionally with back pain. She was told it was arthritis at some point.   History of Fecal incontinence- no recurrence. Doing ok.   She noted change in BM 8/2018 with increased fecal urgency and incontinence.  She also had stress fecal incontinence.  She had significant hemorrhoids on exam and possible mild rectal prolapse as well with decreased rectal tone.  Patient wanted to start with follow-up with Dr. Covarrubias but did not schedule follow-up with him.  She reports symptoms improved.    I discussed the need for follow-up with GI, MRI lumbosacral region, and consideration of further neurological work-up as well as consideration of pelvic floor strengthening PT.  She did not want to proceed with further work-up at that time and has had no recurrence of the symptoms    Kidney stone- went to ER for kidney stone, hydronephrosis, diverticulosis, abdominal pain,  diaphragmatic hernia. Not sure but thinks could have passed. Follow up with urology-   Patient went to ER 4/222/2023 for flank pain, nausea, and vomiting. Called urology- start antibiotics and follow up outpatient. Given Norco, Zofran, Keflex given in the hospital and discharged on Keflex 500 mg 4 times daily for 5 days.  Patient asked that medication be changed to Toradol-MB declined in association of patient's age.  Urine sample contaminated with stool.  CT abd/pelvis-multiple tiny bilateral nonobstructing renal stones.  3 mm right UPJ  "stone with mild to moderate right-sided hydronephrosis.  Extensive sigmoid diverticulosis without diverticulitis.  Fat-containing left inguinal hernia.  Mild to moderate degenerative changes lumbar spine.  UA- protein, hematuria, leuk est, and 4+ bacteria.   Creatinine 1.21  Seen by Dr Chaudhari    GERD-has been stable on Prilosec without breakthrough symptoms. If misses a dose, she feels it immediately.   She had an EGD 5/2018 without stricture or need for dilation (history of stricture in the past).  She was noted to have medium/moderate hiatal hernia and mucosa was suggestive of eosinophilic esophagitis with negative biopsies for eosinophils.  He recommended repeat EGD in 3 to 5 years.    Moods- increased to Prozac 20 mg twice daily- has been doing ok.   Prozac 20 mg once daily- tolerated without AE but felt she was snappy and crying easily. She was unsure it was working as well. Not sure if it was related to dizziness or her daughter's stressors or 's issues. She was still working from home 2 days a week and at work 3 days.     Dizziness/tinnitus- neurology was unable to find etiology of dizziness.  She continues with dizziness. Left top of head and parietal area. She wakes up with a headache- mostly on the left but some on the right as well. She notices that when she leans her head back and keeps it steady, she has improvement in dizziness. No neck pain, numbness or tingling in arms. Feels like \"when you sleep too much and wake up\"- feels that way all the time. At night, not moving much. Does not wake up but when she gets up, she feels weird. Affects the way she feels about things.   Dizziness started 2/2020. When she gets up, she feels like her eyes are twitching and very occasionally feels some spinning. Symptoms occur seldom when she was driving down the road, and she got nervous to focus and see. No consistent dizziness with head turning and no dizziness with laying down. She has had ringing in her ears " for years with normal hearing and no ear pain. If she lays on 1 side too long, she has a headache- worse on the left. She denies confusion, trouble speaking, trouble using arms/ legs, and blurred vision. She has had numbness in hands intermittent but no other numbness or neurological symptoms.   Last opt/ophthalmology was > 1 year ago. She was advised to go for annual eye exam.  I referred for MRI brain and IAC with opacified hypoplastic right sphenoid sinus. She was treated for sinusitis and advised we can send to ENT.   Carotid duplex with bilateral plaque without stenosis.    ENT- Dr Lozano- he did ENG and testing. Told slight hearing loss but no other etiology of symtpoms.   Neurosurgery-     Dizziness, unsteady gait, neck pain, DDD/OA/facet arthropathy cervical spine- no numbness, tingling or weakness in arm or hand    Arthritis-She uses Tylenol as needed.  She had right MCP surgery 10/2018 and left MCP surgery 12/2018 and healed well.    She also has chronic left knee pain, intermittent feels like it going to give out. She has been seen by orthopedic surgery with arthritis of her knees.      Skin discoloration, axillary abnormality- has not changed but continues. Not bothersome. Fading but not gone. She does not want to see dermatology.   Has discoloration of skin left axilla- now spreading- was read and now not as red. No pain or itching. It was really read and now faded but has spread in size.     Patient's Specialists:  Cardiology- Dr South- last appt 9/2018 for preop cleareance. EKG thought to be negative. Hyperlipidemia- advised low dose statin and risk reduction. Follow up PRN.   GI-Dr Covarrubias- last EGD 5/2018 medium hiatal hernia. Repeat in 3-5 years.   Hand surgery- Dr Tejada- last appt 1/2021 in follow up of radial digital nerve laceration left index finger with repair with vein graft 12/2020. Follow up in 3 months.   Prior 1/2019 for left thumb carpometacarpal arthoplasty with LRTI and left de  Felipain's release.   Neurology- Dr Carroll- last appt 8/2021 for dizziness, vertebrobasilar insufficiency, intracranial cerebrovascular stenosis.  Increase aspirin to 325 mg daily.  Referred to neurosurgery for possible arteriogram to determine outpouching as infundibulum versus aneurysm.  Advised him sleep study.  Follow-up after sleep study.   Neurosurgery-Dr. Quiñones- no vertebrobasilar stenosis or vertebral artery narrowing bilaterally.  No vascular explanation for symptoms.  Follow-up as needed.  Orthopedic surgery- Dr Lee Roberto- last appt 8/2017 for patellofemoral arthritis left knee. Received injection. Advied try tumeric or glucosamine. Follow up PRN.   ENT-Dr. Severtson-last appt 3/2022 for generalized disequilibrium, dizziness, tinnitus, and allergic rhinitis.  Advised increased activity level, RADHA through [facility], avoid caffeine, nicotine, chocolate, and salt.  CBT versus TCA for tinnitus.  Consider hearing aid evaluation.  Prior Dr Lozano- . Treated a second time for blocked sphenoid sinus.   Podiatry- Dr Julian- last appt 7/2020 for 5th metatarsal fracture. Advised MELONIE boot.     The following portions of the patient's history were reviewed and updated as appropriate: allergies, current medications, past family history, past medical history, past social history, past surgical history and problem list.    Review of Systems   Constitutional: Negative.  Negative for fever.   HENT:  Positive for tinnitus. Negative for congestion, ear pain, postnasal drip and sore throat.    Respiratory: Negative.  Negative for shortness of breath.    Cardiovascular: Negative.    Gastrointestinal: Negative.    Genitourinary: Negative.    Musculoskeletal:  Positive for arthralgias, back pain, gait problem, neck pain and neck stiffness.   Skin:  Positive for rash.   Neurological:  Positive for dizziness.   Psychiatric/Behavioral:  Dysphoric mood: improving.        Objective    Vitals:    11/20/23 1015   BP:  118/82   Pulse: 92   Resp: 18   Temp: 97 °F (36.1 °C)   SpO2: 92%     Body mass index is 35.61 kg/m².    Physical Exam   Constitutional: She is oriented to person, place, and time. She appears well-developed.   HENT:   Head: Normocephalic and atraumatic.   Right Ear: External ear normal.   Left Ear: External ear normal.   Cardiovascular: Normal rate, regular rhythm and normal heart sounds.   Pulmonary/Chest: Effort normal and breath sounds normal. She has no wheezes. She has no rales.   Musculoskeletal: Normal range of motion. No deformity.   Neurological: She is alert and oriented to person, place, and time.   Reflex Scores:       Patellar reflexes are 2+ on the right side and 2+ on the left side.       Achilles reflexes are 2+ on the right side and 2+ on the left side.  Psychiatric: Her speech is normal and behavior is normal. Thought content normal.   Nursing note and vitals reviewed.      Assessment & Plan   Diagnoses and all orders for this visit:    1. Acute left-sided low back pain without sciatica (Primary)  -     Urinalysis With Microscopic - Urine, Clean Catch  -     Urine Culture - Urine, Urine, Clean Catch  -     ketorolac (TORADOL) injection 30 mg  -     methylPREDNISolone (Medrol) 4 MG dose pack; follow package directions  Dispense: 21 tablet; Refill: 0  -     cyclobenzaprine (FLEXERIL) 10 MG tablet; Take 1 tablet by mouth 3 (Three) Times a Day As Needed for Muscle Spasms.  Dispense: 30 tablet; Refill: 0    2. Elevated fasting glucose  -     POC Glycated Hemoglobin, Total    3. Unspecified abnormal findings in urine  -     Urine Culture - Urine, Urine, Clean Catch           Assessment and plan  Patient is due for follow-up with me 1/2024 for her 3-month follow-up, fasting.  Back pain- Patient with intermittent low back pain.  She previously had symptoms and was treated by Dr. Daniels 2019 with Toradol injection, prednisone, and Flexeril with resolution.  She also has history of fecal incontinence with  decreased anal sphincter tone and was advised she needed MRI lumbosacral region.  She did not want to proceed with further imaging at that time.  I again discussed with her possible need for additional imaging, x-ray, and MRI as well as further work-up or specialist referral.  She feels like this is recurrence of similar pain.  She does have left SI joint tenderness and has no radicular symptoms today.  I will treat with Toradol 30 mg today, Medrol Dosepak, and gave Flexeril refill.  She has been counseled on the need to avoid driving, working, operating machinery, and lifting on medication.  She will need to let me know if she has no resolution, persistent symptoms, worsening, new or changing symptoms, and we will need to consider further work-up.  Fecal incontinence- If recurrence of symptoms or other symptoms, consider follow up with GI, the need for MRI lumbosacral region, consideration of further neurological work-up, as well as consideration of pelvic floor strengthening PT.  She should let me know if she is willing to proceed with further work-up.  Cervical spine DDD, facet arthropathy, OA- MRI 2/2022 with mild to moderate OA and degenerative changes, minimal spinal stenosis at C5-6, and possible left nerve root involvement of C6 left nerve root.  Patient should consider consultation with neurosurgery if recurrence, no resolution of back pain, worsening, new or changing symptoms.      From 10/2023-  Hyperlipidemia-Last lipids were stable. Continue atorvastatin 10 mg at bedtime.  Further treatment recommendations pending labs.     Vitamin D deficiency-Continue vitamin D 1000 IU daily. Dosing recommendations following labs.   B12 deficiency-Continue B12 500 mcg daily. Await labs for further recommendations.    GERD-Controlled symptoms. Continue Prilosec 20 mg once daily. Patient needs to schedule repeat EGD- was due 5/2021.  Depression with anxiety- Moods were uncontrolled with increased stressors and  irritability. She has had improvement with Prozac 20 mg twice daily. To be seen ASAP if worsening moods or AE with medication.   Dizziness/ Tinnitus- I referred for MRI brain and IAC as well as carotid duplex with some sinus disease but otherwise negative. CTA head and neck to ensure no cerbrovascular etiology of symptoms and advised BPPV physical therapy/ exercises. She was seen by ENT who thought there could be a vascular etiology. They ordered VNG- no etiology of symptoms. She was seen by neurology who also thought some cerebrovascular disease and advised Aspirin 325 mg daily and sleep study. She was seen by neurosurgery without etiology. I will contact neurology to determine dizziness specialist at [facility]. Also, she does have some neck pain. MRI cervical spine with mild to moderate OA and DDD with spinal stenosis and possible left nerve root involvement. If no improvement, worsening, new, or changing symptoms, I will consider neurosurgery vs tertiary center.    Arthritis-Stable- to continue Tylenol as needed. To see hand surgery or orthopedic surgery if worsening.    From [facility] 3/2023. She is overdue for mammogram, DEXA, and colon cancer screening. She declines colonoscopy- I will place order for Cologuard. She should contact insurance to determine coverage and location of coverage for Pneumovax. We need to obtain records/ date for 2nd Shingrix vaccination.     I spent 20 minutes caring for Virginia Leiva on this date of service. This time includes time spent by me in the following activities: preparing for the visit, reviewing tests, specialists records, and previous visits, obtaining and/or reviewing a separately obtained history, performing a medically appropriate examination and/or evaluation, counseling and educating the patient/family/caregiver, referring and/or communicating with other health care professionals as necessary, documenting information in the medical record, independently interpreting results  and communicating that information with the patient/family/caregiver, and developing a medically appropriate treatment plan with consideration of other conditions, medications, and treatments.

## 2023-11-22 DIAGNOSIS — K21.9 GASTROESOPHAGEAL REFLUX DISEASE: ICD-10-CM

## 2023-11-22 DIAGNOSIS — K22.2 ESOPHAGEAL STRICTURE: ICD-10-CM

## 2023-11-22 RX ORDER — FLUOXETINE HYDROCHLORIDE 20 MG/1
CAPSULE ORAL
Qty: 180 CAPSULE | Refills: 0 | Status: SHIPPED | OUTPATIENT
Start: 2023-11-22

## 2023-11-22 NOTE — TELEPHONE ENCOUNTER
Please ensure we call this patient to get her scheduled for January 2023 for her 3-month follow-up from October labs.

## 2023-11-23 LAB
APPEARANCE UR: ABNORMAL
BACTERIA #/AREA URNS HPF: ABNORMAL /HPF
BACTERIA UR CULT: ABNORMAL
BACTERIA UR CULT: ABNORMAL
BILIRUB UR QL STRIP: NEGATIVE
COLOR UR: YELLOW
EPI CELLS #/AREA URNS HPF: ABNORMAL /HPF
GLUCOSE UR QL STRIP: NEGATIVE
HGB UR QL STRIP: NEGATIVE
KETONES UR QL STRIP: NEGATIVE
LEUKOCYTE ESTERASE UR QL STRIP: ABNORMAL
NITRITE UR QL STRIP: POSITIVE
OTHER ANTIBIOTIC SUSC ISLT: ABNORMAL
PH UR STRIP: 6 [PH] (ref 5–8)
PROT UR QL STRIP: NEGATIVE
RBC #/AREA URNS HPF: ABNORMAL /HPF
SP GR UR STRIP: 1.02 (ref 1–1.03)
UROBILINOGEN UR STRIP-MCNC: ABNORMAL MG/DL
WBC #/AREA URNS HPF: ABNORMAL /HPF

## 2023-11-27 RX ORDER — NITROFURANTOIN 25; 75 MG/1; MG/1
100 CAPSULE ORAL 2 TIMES DAILY
Qty: 14 CAPSULE | Refills: 0 | Status: SHIPPED | OUTPATIENT
Start: 2023-11-27

## 2023-12-13 ENCOUNTER — PATIENT MESSAGE (OUTPATIENT)
Dept: FAMILY MEDICINE CLINIC | Facility: CLINIC | Age: 68
End: 2023-12-13
Payer: MEDICARE

## 2023-12-13 DIAGNOSIS — M51.36 DEGENERATIVE DISC DISEASE, LUMBAR: ICD-10-CM

## 2023-12-13 DIAGNOSIS — G89.29 CHRONIC BILATERAL LOW BACK PAIN WITH SCIATICA, SCIATICA LATERALITY UNSPECIFIED: ICD-10-CM

## 2023-12-13 DIAGNOSIS — M54.40 CHRONIC BILATERAL LOW BACK PAIN WITH SCIATICA, SCIATICA LATERALITY UNSPECIFIED: ICD-10-CM

## 2023-12-13 DIAGNOSIS — M47.812 FACET ARTHROPATHY, CERVICAL: ICD-10-CM

## 2023-12-13 DIAGNOSIS — M54.50 ACUTE LEFT-SIDED LOW BACK PAIN WITHOUT SCIATICA: Primary | ICD-10-CM

## 2023-12-13 DIAGNOSIS — K62.89 DECREASED ANAL SPHINCTER TONE: ICD-10-CM

## 2023-12-13 NOTE — TELEPHONE ENCOUNTER
Patient was seen less than a month ago for back pain. Please advise if you would like her to come back into the office.

## 2023-12-18 ENCOUNTER — HOSPITAL ENCOUNTER (OUTPATIENT)
Dept: MRI IMAGING | Facility: HOSPITAL | Age: 68
Discharge: HOME | End: 2023-12-18
Admitting: PHYSICIAN ASSISTANT
Payer: MEDICARE

## 2023-12-18 DIAGNOSIS — G89.29 CHRONIC BILATERAL LOW BACK PAIN WITH SCIATICA, SCIATICA LATERALITY UNSPECIFIED: ICD-10-CM

## 2023-12-18 DIAGNOSIS — K62.89 DECREASED ANAL SPHINCTER TONE: ICD-10-CM

## 2023-12-18 DIAGNOSIS — M54.40 CHRONIC BILATERAL LOW BACK PAIN WITH SCIATICA, SCIATICA LATERALITY UNSPECIFIED: ICD-10-CM

## 2023-12-18 DIAGNOSIS — M51.36 DEGENERATIVE DISC DISEASE, LUMBAR: ICD-10-CM

## 2023-12-18 DIAGNOSIS — M54.50 ACUTE LEFT-SIDED LOW BACK PAIN WITHOUT SCIATICA: ICD-10-CM

## 2023-12-18 PROCEDURE — 82565 ASSAY OF CREATININE: CPT

## 2023-12-18 PROCEDURE — 0 GADOBENATE DIMEGLUMINE 529 MG/ML SOLUTION: Performed by: PHYSICIAN ASSISTANT

## 2023-12-18 PROCEDURE — A9577 INJ MULTIHANCE: HCPCS | Performed by: PHYSICIAN ASSISTANT

## 2023-12-18 PROCEDURE — 72158 MRI LUMBAR SPINE W/O & W/DYE: CPT

## 2023-12-18 RX ADMIN — GADOBENATE DIMEGLUMINE 19 ML: 529 INJECTION, SOLUTION INTRAVENOUS at 13:35

## 2023-12-20 LAB — CREAT BLDA-MCNC: 1.3 MG/DL (ref 0.6–1.3)

## 2023-12-22 ENCOUNTER — TELEPHONE (OUTPATIENT)
Dept: FAMILY MEDICINE CLINIC | Facility: CLINIC | Age: 68
End: 2023-12-22

## 2023-12-22 NOTE — TELEPHONE ENCOUNTER
Caller: Virginia Leiva S    Relationship: Self    Best call back number: [phone]    What test was performed: MRI     When was the test performed: 12/18/2023    Where was the test performed: [facility]     Additional notes: PATIENT STATES SHE CAN SEE THE RESULTS AVAILABLE ON [facility] BUT WOULD LIKE FOR SOMEONE TO CALL HER TO GO OVER THE RESULTS.     PATIENT IS REQUESTING A CALL BACK.

## 2023-12-26 DIAGNOSIS — M54.50 ACUTE LEFT-SIDED LOW BACK PAIN WITHOUT SCIATICA: Primary | ICD-10-CM

## 2023-12-26 DIAGNOSIS — M54.40 CHRONIC BILATERAL LOW BACK PAIN WITH SCIATICA, SCIATICA LATERALITY UNSPECIFIED: ICD-10-CM

## 2023-12-26 DIAGNOSIS — G89.29 CHRONIC BILATERAL LOW BACK PAIN WITH SCIATICA, SCIATICA LATERALITY UNSPECIFIED: ICD-10-CM

## 2023-12-26 DIAGNOSIS — M51.36 DEGENERATIVE DISC DISEASE, LUMBAR: ICD-10-CM

## 2024-01-25 DIAGNOSIS — K22.2 ESOPHAGEAL STRICTURE: ICD-10-CM

## 2024-01-25 DIAGNOSIS — K21.9 GASTROESOPHAGEAL REFLUX DISEASE: ICD-10-CM

## 2024-01-26 RX ORDER — OMEPRAZOLE 20 MG/1
CAPSULE, DELAYED RELEASE ORAL
Qty: 90 CAPSULE | Refills: 0 | Status: SHIPPED | OUTPATIENT
Start: 2024-01-26

## 2024-01-27 NOTE — TELEPHONE ENCOUNTER
See result note and last note- she was to follow up with me 1/2024. Please schedule her for follow up with me.

## 2024-02-02 NOTE — PROGRESS NOTES
Subjective   History of Present Illness: Virginia Leiva is a 68 y.o. female is here today for follow-up for acute left sided low back pain without sciatica, chronic bilateral low back pain with sciatica.  She had an MRI Lumbar on 12/18/2023 @ [facility].  She reports that she has had back pain for several years.  The back pain is intermittent.  The back pain tends to last for 1 to 2 weeks at a time.  She has 1 to 2 weeks of pain per year.  She reports that her back pain has significantly improved since the time of her MRI.  She is doing well today.  She denies any changes in strength or sensation.  She reports that occasionally she will have pain down the back of her left leg however that is rare.    Back Pain  This is a recurrent problem. The problem occurs monthly. The problem has been comes and goes since onset. The pain is present in the lumbar spine. The quality of the pain is described as stabbing. Radiates to: Left leg. The pain is at a severity of 0/10. The symptoms are aggravated by sitting. Associated symptoms include leg pain, numbness, tingling and weakness. Pertinent negatives include no bladder incontinence, bowel incontinence or dysuria. (Left leg ) She has tried muscle relaxant for the symptoms. The treatment provided mild relief.       The following portions of the patient's history were reviewed and updated as appropriate: allergies, current medications, past family history, past medical history, past social history, past surgical history, and problem list.    Past Medical History:   Diagnosis Date    Anxiety     Arthritis     GERD (gastroesophageal reflux disease)     Glaucoma     History of kidney infection     Hyperlipidemia     Kidney stones     Migraine         Past Surgical History:   Procedure Laterality Date    BLADDER REPAIR  2008    tack     COLONOSCOPY      ENDOSCOPY      2008    ENDOSCOPY N/A 05/18/2018    Procedure: ESOPHAGOGASTRODUODENOSCOPY WITH COLD BIOPSIES;  Surgeon: Yobani Covarrubias MD;   Location: [facility] ENDOSCOPY;  Service: Gastroenterology    FINGER NERVE REPAIR      HAND SURGERY      HYSTERECTOMY  2007    with bladder tack     OOPHORECTOMY      TUBAL ABDOMINAL LIGATION  1984          Current Outpatient Medications:     aspirin 325 MG tablet, Take 1 tablet by mouth Daily., Disp: , Rfl:     atorvastatin (LIPITOR) 10 MG tablet, TAKE 1 TABLET EVERY NIGHT (NO REFILLS. MUST MAKE APPOINTMENT CALL OFFICE), Disp: 90 tablet, Rfl: 1    cholecalciferol (VITAMIN D3) 25 MCG (1000 UT) tablet, , Disp: , Rfl:     cyclobenzaprine (FLEXERIL) 10 MG tablet, Take 1 tablet by mouth 3 (Three) Times a Day As Needed for Muscle Spasms., Disp: 30 tablet, Rfl: 0    erythromycin (ROMYCIN) 5 MG/GM ophthalmic ointment, Administer  into the left eye Every Night., Disp: 3.5 g, Rfl: 0    FLUoxetine (PROzac) 20 MG capsule, TAKE 1 CAPSULE TWICE DAILY, Disp: 180 capsule, Rfl: 0    folic acid (FOLVITE) 1 MG tablet, Take 1 tablet by mouth Daily., Disp: , Rfl:     LOPERAMIDE HCL PO, Take  by mouth As Needed., Disp: , Rfl:     methylPREDNISolone (Medrol) 4 MG dose pack, follow package directions, Disp: 21 tablet, Rfl: 0    omeprazole (priLOSEC) 20 MG capsule, TAKE 1 CAPSULE EVERY DAY (NEED MD APPOINTMENT FOR REFILLS), Disp: 90 capsule, Rfl: 0    vitamin B-12 (CYANOCOBALAMIN) 1000 MCG tablet, Take 1 tablet by mouth 2 (Two) Times a Week., Disp: , Rfl:     nitrofurantoin, macrocrystal-monohydrate, (Macrobid) 100 MG capsule, Take 1 capsule by mouth 2 (Two) Times a Day., Disp: 14 capsule, Rfl: 0     No Known Allergies     Social History     Socioeconomic History    Marital status:      Spouse name: CHAVEZ    Number of children: 2   Tobacco Use    Smoking status: Never    Smokeless tobacco: Never   Vaping Use    Vaping Use: Never used   Substance and Sexual Activity    Alcohol use: Not Currently    Drug use: No    Sexual activity: Defer        Family History   Problem Relation Age of Onset    Cancer Mother         stomach    Arthritis  "Mother     Cancer Father         pancreatic    Aortic aneurysm Father     Cancer Paternal Grandmother         OVARIAN    Cancer Maternal Grandfather         LUNG    Malig Hyperthermia Neg Hx     Breast cancer Neg Hx         Review of Systems   Eyes:  Negative for visual disturbance.   Gastrointestinal:  Negative for bowel incontinence.   Genitourinary:  Negative for bladder incontinence, difficulty urinating and dysuria.   Musculoskeletal:  Positive for back pain and gait problem.   Neurological:  Positive for dizziness, tingling, weakness and numbness.       Objective     Vitals:    24 0937   BP: 130/82   Cuff Size: Adult   Pulse: 76   Temp: 97.1 °F (36.2 °C)   SpO2: 95%   Weight: 91.2 kg (201 lb)   Height: 160 cm (62.99\")     Body mass index is 35.62 kg/m².      Physical Exam  Neurologic Exam  Awake, alert, oriented x3  Pupils equal round reactive to light  Extraocular muscles intact  Face symmetric  Speech is fluent and clear  Motor exam  Bilateral deltoids 5/5, bilateral biceps 5/5, bilateral triceps 5/5, bilateral wrist extension 5/5 bilateral hand  5/5  Bilateral hip flexion 5/5, bilateral knee extension 5/5, bilateral DF/PF 5/5  No clonus  No Jorge's reflex  Steady normal gait  Able to detect  light touch in all 4 extremities      Assessment & Plan   Independent Review of Radiographic Studies:      I personally reviewed the images from the following studies.  MRI internal auditory canal from 2020, MRI of the lumbar spine with and without contrast from 2023  The MRI of the internal auditory canal shows no evidence of vestibular schwannoma or infarct.  The MRI of the lumbar spine shows mild degenerative changes throughout the lumbar spine with no evidence of severe canal or neuroforaminal stenosis    Medical Decision Makin-year-old female with lower back pain and intermittent left lower extremity pain and episodes of dizziness  -She reports that since the MRI of her " lumbar spine her back pain has resolved.  She has had back pain for many years.  She reports that she has 1-2 flareups per year that last 1 to 2 weeks.  She occasionally will have left lower extremity pain.  She wants to hold off from any treatment or further workup at this time since her symptoms have improved.  I have asked her to call back if she develops any worsening of her back or leg pain and we could consider a steroid Dosepak or physical therapy or repeat imaging if needed.  -She has also mentioned daily episodes of dizziness.  She had an MRI of the internal auditory canal in 2020 that showed no evidence of mass lesion or large vessel infarct.  I will plan to refer her to physical therapy for further evaluation and treatment for possible vertigo.  If the physical therapy does not help alleviate her symptoms I have asked her to follow-up with her neurologist for further recommendations  Diagnoses and all orders for this visit:    1. Back pain without radiculopathy (Primary)    2. Vertigo  -     Ambulatory Referral to Physical Therapy Evaluate and treat      Return if symptoms worsen or fail to improve.  I spent 40 minutes reviewing the medical record, reviewing the MRI images, discussing the MRI findings, discussing the signs and symptoms of vertigo, discussing the benefits of physical therapy, discussing the causes of back pain, discussing management strategies for back pain, discussing the signs and symptoms of a lumbar disc herniation

## 2024-02-05 ENCOUNTER — OFFICE VISIT (OUTPATIENT)
Dept: NEUROSURGERY | Facility: CLINIC | Age: 69
End: 2024-02-05
Payer: MEDICARE

## 2024-02-05 VITALS
HEIGHT: 63 IN | BODY MASS INDEX: 35.61 KG/M2 | HEART RATE: 76 BPM | OXYGEN SATURATION: 95 % | SYSTOLIC BLOOD PRESSURE: 130 MMHG | TEMPERATURE: 97.1 F | DIASTOLIC BLOOD PRESSURE: 82 MMHG | WEIGHT: 201 LBS

## 2024-02-05 DIAGNOSIS — M54.9 BACK PAIN WITHOUT RADICULOPATHY: Primary | ICD-10-CM

## 2024-02-05 DIAGNOSIS — R42 VERTIGO: ICD-10-CM

## 2024-02-05 PROCEDURE — 1159F MED LIST DOCD IN RCRD: CPT | Performed by: NEUROLOGICAL SURGERY

## 2024-02-05 PROCEDURE — 1160F RVW MEDS BY RX/DR IN RCRD: CPT | Performed by: NEUROLOGICAL SURGERY

## 2024-02-05 PROCEDURE — 99215 OFFICE O/P EST HI 40 MIN: CPT | Performed by: NEUROLOGICAL SURGERY

## 2024-04-24 DIAGNOSIS — K22.2 ESOPHAGEAL STRICTURE: ICD-10-CM

## 2024-04-24 DIAGNOSIS — K21.9 GASTROESOPHAGEAL REFLUX DISEASE: ICD-10-CM

## 2024-04-24 RX ORDER — OMEPRAZOLE 20 MG/1
20 CAPSULE, DELAYED RELEASE ORAL DAILY
Qty: 90 CAPSULE | Refills: 0 | Status: SHIPPED | OUTPATIENT
Start: 2024-04-24

## 2024-05-17 ENCOUNTER — OFFICE VISIT (OUTPATIENT)
Dept: FAMILY MEDICINE CLINIC | Facility: CLINIC | Age: 69
End: 2024-05-17
Payer: MEDICARE

## 2024-05-17 ENCOUNTER — PATIENT ROUNDING (BHMG ONLY) (OUTPATIENT)
Dept: FAMILY MEDICINE CLINIC | Facility: CLINIC | Age: 69
End: 2024-05-17
Payer: MEDICARE

## 2024-05-17 VITALS
HEART RATE: 80 BPM | OXYGEN SATURATION: 99 % | RESPIRATION RATE: 18 BRPM | HEIGHT: 63 IN | SYSTOLIC BLOOD PRESSURE: 128 MMHG | TEMPERATURE: 98.5 F | DIASTOLIC BLOOD PRESSURE: 86 MMHG | WEIGHT: 202 LBS | BODY MASS INDEX: 35.79 KG/M2

## 2024-05-17 DIAGNOSIS — G89.29 BILATERAL CHRONIC KNEE PAIN: ICD-10-CM

## 2024-05-17 DIAGNOSIS — M54.40 CHRONIC BILATERAL LOW BACK PAIN WITH SCIATICA, SCIATICA LATERALITY UNSPECIFIED: ICD-10-CM

## 2024-05-17 DIAGNOSIS — M47.812 FACET ARTHROPATHY, CERVICAL: ICD-10-CM

## 2024-05-17 DIAGNOSIS — H93.19 TINNITUS, UNSPECIFIED LATERALITY: ICD-10-CM

## 2024-05-17 DIAGNOSIS — G45.0 VERTEBROBASILAR INSUFFICIENCY: ICD-10-CM

## 2024-05-17 DIAGNOSIS — M51.36 DEGENERATIVE DISC DISEASE, LUMBAR: ICD-10-CM

## 2024-05-17 DIAGNOSIS — K22.2 ESOPHAGEAL STRICTURE: ICD-10-CM

## 2024-05-17 DIAGNOSIS — Z12.31 ENCOUNTER FOR SCREENING MAMMOGRAM FOR MALIGNANT NEOPLASM OF BREAST: ICD-10-CM

## 2024-05-17 DIAGNOSIS — G89.29 CHRONIC BILATERAL LOW BACK PAIN WITH SCIATICA, SCIATICA LATERALITY UNSPECIFIED: ICD-10-CM

## 2024-05-17 DIAGNOSIS — R26.9 ABNORMAL GAIT: ICD-10-CM

## 2024-05-17 DIAGNOSIS — Z80.0 FAMILY HISTORY OF GI MALIGNANCY: ICD-10-CM

## 2024-05-17 DIAGNOSIS — M54.2 NECK PAIN ON LEFT SIDE: ICD-10-CM

## 2024-05-17 DIAGNOSIS — S64.40XS LACERATION OF DIGITAL NERVE OF FINGER, SEQUELA: ICD-10-CM

## 2024-05-17 DIAGNOSIS — M54.50 ACUTE LEFT-SIDED LOW BACK PAIN WITHOUT SCIATICA: ICD-10-CM

## 2024-05-17 DIAGNOSIS — I65.23 ATHEROSCLEROSIS OF BOTH CAROTID ARTERIES: ICD-10-CM

## 2024-05-17 DIAGNOSIS — N20.0 KIDNEY STONES: ICD-10-CM

## 2024-05-17 DIAGNOSIS — M19.90 ARTHRITIS: ICD-10-CM

## 2024-05-17 DIAGNOSIS — Z00.00 MEDICARE ANNUAL WELLNESS VISIT, SUBSEQUENT: Primary | ICD-10-CM

## 2024-05-17 DIAGNOSIS — M72.2 PLANTAR FASCIITIS: ICD-10-CM

## 2024-05-17 DIAGNOSIS — R42 DIZZINESS: ICD-10-CM

## 2024-05-17 DIAGNOSIS — M84.374S STRESS FRACTURE OF METATARSAL BONE OF RIGHT FOOT, SEQUELA: ICD-10-CM

## 2024-05-17 DIAGNOSIS — R27.0 ATAXIA: ICD-10-CM

## 2024-05-17 DIAGNOSIS — K62.89 DECREASED ANAL SPHINCTER TONE: ICD-10-CM

## 2024-05-17 DIAGNOSIS — E78.49 OTHER HYPERLIPIDEMIA: ICD-10-CM

## 2024-05-17 DIAGNOSIS — R73.01 ELEVATED FASTING GLUCOSE: ICD-10-CM

## 2024-05-17 DIAGNOSIS — F41.8 DEPRESSION WITH ANXIETY: ICD-10-CM

## 2024-05-17 DIAGNOSIS — M19.049 CMC ARTHRITIS: ICD-10-CM

## 2024-05-17 DIAGNOSIS — R79.9 ABNORMAL FINDING OF BLOOD CHEMISTRY, UNSPECIFIED: ICD-10-CM

## 2024-05-17 DIAGNOSIS — N28.9 ABNORMAL RENAL FUNCTION: ICD-10-CM

## 2024-05-17 DIAGNOSIS — E55.9 VITAMIN D DEFICIENCY: ICD-10-CM

## 2024-05-17 DIAGNOSIS — M25.562 BILATERAL CHRONIC KNEE PAIN: ICD-10-CM

## 2024-05-17 DIAGNOSIS — M25.561 BILATERAL CHRONIC KNEE PAIN: ICD-10-CM

## 2024-05-17 DIAGNOSIS — K21.00 GASTROESOPHAGEAL REFLUX DISEASE WITH ESOPHAGITIS WITHOUT HEMORRHAGE: ICD-10-CM

## 2024-05-17 DIAGNOSIS — E53.8 B12 DEFICIENCY: ICD-10-CM

## 2024-05-17 DIAGNOSIS — D75.89 MACROCYTOSIS: ICD-10-CM

## 2024-05-17 NOTE — PROGRESS NOTES
Subjective   Virginia Leiva is a 68 y.o. female who presents today in follow-up of hyperlipidemia, vitamin D and B12 deficiencies, macrocytosis, GERD, moods, dizziness, back pain, arthritis, and history of fecal incontenence.    Hyperlipidemia  Pertinent negatives include no shortness of breath.   Anxiety  Symptoms include dizziness. Patient reports no shortness of breath.       Staying tired but hard to do things when she has dizziness.     Hyperlipidemia-has done well with starting atorvastatin and is tolerating without AE.  Elevated fasting glucose-A1c 5.6% 11/2023.  Vitamin D-taking 2000 IU once daily.  Advised to increase vitamin D to 2000 IU daily 3/2023.  Macrocytosis, B12-taking B12 500 mcg once weekly.  Now on folic acid instead of MTV.   Abnormal renal function- was told by neurosurgery could take Naprosyn x 3 if pain. She does not take all the time but took a few days ago. Got better with heating pad.     Kidney stone- no issues now.   went to ER for kidney stone, hydronephrosis, diverticulosis, abdominal pain,  diaphragmatic hernia. Not sure but thinks could have passed. Follow up with urology-   Patient went to ER 4/222/2023 for flank pain, nausea, and vomiting. Called urology- start antibiotics and follow up outpatient. Given Norco, Zofran, Keflex given in the hospital and discharged on Keflex 500 mg 4 times daily for 5 days.  Patient asked that medication be changed to Toradol-MB declined in association of patient's age.  Urine sample contaminated with stool.  CT abd/pelvis-multiple tiny bilateral nonobstructing renal stones.  3 mm right UPJ stone with mild to moderate right-sided hydronephrosis.  Extensive sigmoid diverticulosis without diverticulitis.  Fat-containing left inguinal hernia.  Mild to moderate degenerative changes lumbar spine.  UA- protein, hematuria, leuk est, and 4+ bacteria.   Creatinine 1.21  Seen by Dr Fara DAVEY- ok with taking medication- Prilosec without breakthrough  "symptoms. If misses a dose, she feels it immediately. Did have hamburger get stuck a [place]  She had an EGD 5/2018 without stricture or need for dilation (history of stricture in the past).  She was noted to have medium/moderate hiatal hernia and mucosa was suggestive of eosinophilic esophagitis with negative biopsies for eosinophils.  He recommended repeat EGD in 3 to 5 years.    Moods- increased to Prozac 20 mg twice daily- has been doing ok.   Prozac 20 mg once daily- tolerated without AE but felt she was snappy and crying easily. She was unsure it was working as well. Not sure if it was related to dizziness or her daughter's stressors or 's issues. She was still working from home 2 days a week and at work 3 days.     Dizziness/tinnitus- still has intermittent.   neurology was unable to find etiology of dizziness.  She continues with dizziness. Left top of head and parietal area. She wakes up with a headache- mostly on the left but some on the right as well. She notices that when she leans her head back and keeps it steady, she has improvement in dizziness. No neck pain, numbness or tingling in arms. Feels like \"when you sleep too much and wake up\"- feels that way all the time. At night, not moving much. Does not wake up but when she gets up, she feels weird. Affects the way she feels about things.   Dizziness started 2/2020. When she gets up, she feels like her eyes are twitching and very occasionally feels some spinning. Symptoms occur seldom when she was driving down the road, and she got nervous to focus and see. No consistent dizziness with head turning and no dizziness with laying down. She has had ringing in her ears for years with normal hearing and no ear pain. If she lays on 1 side too long, she has a headache- worse on the left. She denies confusion, trouble speaking, trouble using arms/ legs, and blurred vision. She has had numbness in hands intermittent but no other numbness or neurological " symptoms.   Last opt/ophthalmology was > 1 year ago. She was advised to go for annual eye exam.  I referred for MRI brain and IAC with opacified hypoplastic right sphenoid sinus. She was treated for sinusitis and advised we can send to ENT.   Carotid duplex with bilateral plaque without stenosis.    ENT- Dr Lozano- he did ENG and testing. Told slight hearing loss but no other etiology of symtpoms.   Neurosurgery-     Unsteady gait, neck pain, DDD/OA/facet arthropathy cervical spine, dizziness- no numbness, tingling or weakness in arm or hand.  MRI lumbar spine with mild scoliosis, arthritis, and degenerative changes.  Inferior tip of the conus medullaris posterior midline thecal sac at L2-3-borderline but thought to be lower limits of normal.  Very mild dextroscoliotic curvature of the lumbar spine with apex at L3-4, L2-3 minimal posterior spurring with left paracentral disc protrusion or tiny disc herniation with minimal indentation of the left ventral aspect of the thecal sac and minimally narrows the left sided canal, minimal left foraminal narrowing L2-3.  L4-5-tiny posterior annular fissure with posterior central disc bulge and minimal facet overgrowth and minimal if any canal narrowing.  Mild disc space narrowing degenerative endplate changes L5-S1 with mild bilateral facet overgrowth and mild posterior spurring.  Referred to neurosurgery  Seen by Dr. Archuleta-referred to physical therapy.  Follow-up as needed  Back pain-every once in a while has something but if takes a muscle relaxer x 1, is ok.   No worsening, changing, new or different symptoms. She has not had any increased symptoms.  Patient uses Flexeril only occasionally with back pain. She was told it was arthritis at some point.   History of Fecal incontinence- no recurrence. Doing ok.   She noted change in BM 8/2018 with increased fecal urgency and incontinence.  She also had stress fecal incontinence.  She had significant hemorrhoids on exam and  possible mild rectal prolapse as well with decreased rectal tone.  Patient wanted to start with follow-up with Dr. Covarrubias but did not schedule follow-up with him.  She reports symptoms improved.    I discussed the need for follow-up with GI, MRI lumbosacral region, and consideration of further neurological work-up as well as consideration of pelvic floor strengthening PT.  She did not want to proceed with further work-up at that time and has had no recurrence of the symptoms    Kidney stone- went to ER for kidney stone, hydronephrosis, diverticulosis, abdominal pain,  diaphragmatic hernia. Not sure but thinks could have passed. Follow up with urology-   Patient went to ER 4/222/2023 for flank pain, nausea, and vomiting. Called urology- start antibiotics and follow up outpatient. Given Norco, Zofran, Keflex given in the hospital and discharged on Keflex 500 mg 4 times daily for 5 days.  Patient asked that medication be changed to Toradol-MB declined in association of patient's age.  Urine sample contaminated with stool.  CT abd/pelvis-multiple tiny bilateral nonobstructing renal stones.  3 mm right UPJ stone with mild to moderate right-sided hydronephrosis.  Extensive sigmoid diverticulosis without diverticulitis.  Fat-containing left inguinal hernia.  Mild to moderate degenerative changes lumbar spine.  UA- protein, hematuria, leuk est, and 4+ bacteria.   Creatinine 1.21  Seen by Dr Chaudhari    Arthritis-She uses Tylenol as needed.  She had right MCP surgery 10/2018 and left MCP surgery 12/2018 and healed well.    She also has chronic left knee pain, intermittent feels like it going to give out. She has been seen by orthopedic surgery with arthritis of her knees.      Skin discoloration, axillary abnormality- has not changed but continues. Not bothersome. Fading but not gone. She does not want to see dermatology.   Has discoloration of skin left axilla- now spreading- was read and now not as red. No pain or itching.  It was really read and now faded but has spread in size.     Patient's Specialists:  Cardiology- Dr South- last appt 9/2018 for preop cleareance. EKG thought to be negative. Hyperlipidemia- advised low dose statin and risk reduction. Follow up PRN.   GI-Dr Covarrubias- last EGD 5/2018 medium hiatal hernia. Repeat in 3-5 years.   Hand surgery- Dr Tejada- last appt 1/2021 in follow up of radial digital nerve laceration left index finger with repair with vein graft 12/2020. Follow up in 3 months.   Prior 1/2019 for left thumb carpometacarpal arthoplasty with LRTI and left de Quervain's release.   Neurology- Dr Carroll- last appt 8/2021 for dizziness, vertebrobasilar insufficiency, intracranial cerebrovascular stenosis.  Increase aspirin to 325 mg daily.  Referred to neurosurgery for possible arteriogram to determine outpouching as infundibulum versus aneurysm.  Advised him sleep study.  Follow-up after sleep study.   Neurosurgery-Dr. Quiñones- no vertebrobasilar stenosis or vertebral artery narrowing bilaterally.  No vascular explanation for symptoms.  Follow-up as needed.  Orthopedic surgery- Dr Lee Roberto- last appt 8/2017 for patellofemoral arthritis left knee. Received injection. Advied try tumeric or glucosamine. Follow up PRN.   ENT-Dr. Severtson-last appt 3/2022 for generalized disequilibrium, dizziness, tinnitus, and allergic rhinitis.  Advised increased activity level, RADHA through [facility], avoid caffeine, nicotine, chocolate, and salt.  CBT versus TCA for tinnitus.  Consider hearing aid evaluation.  Prior Dr Lozano- . Treated a second time for blocked sphenoid sinus.   Podiatry- Dr Julian- last appt 7/2020 for 5th metatarsal fracture. Advised MELONIE boot.     The following portions of the patient's history were reviewed and updated as appropriate: allergies, current medications, past family history, past medical history, past social history, past surgical history and problem list.    Review of Systems    Constitutional: Negative.  Negative for fever.   HENT:  Positive for tinnitus. Negative for congestion, ear pain, postnasal drip and sore throat.    Respiratory: Negative.  Negative for shortness of breath.    Cardiovascular: Negative.    Gastrointestinal: Negative.    Genitourinary: Negative.    Musculoskeletal:  Positive for arthralgias, back pain, gait problem, neck pain and neck stiffness.   Skin:  Positive for rash.   Neurological:  Positive for dizziness.   Psychiatric/Behavioral:  Dysphoric mood: improving.        Objective    Vitals:    05/17/24 1018   BP: 128/86   Pulse: 80   Resp: 18   Temp: 98.5 °F (36.9 °C)   SpO2: 99%     Body mass index is 35.79 kg/m².    Physical Exam   Constitutional: She is oriented to person, place, and time. She appears well-developed.   HENT:   Head: Normocephalic and atraumatic.   Right Ear: External ear normal.   Left Ear: External ear normal.   Nose: Nose normal.   Eyes: Conjunctivae and lids are normal.   Neck: Carotid bruit is not present.   Cardiovascular: Normal rate, regular rhythm and normal heart sounds. Exam reveals no gallop and no friction rub.   No murmur heard.  Pulmonary/Chest: Effort normal and breath sounds normal. No respiratory distress. She has no wheezes. She has no rhonchi. She has no rales.   Musculoskeletal: No deformity.   Neurological: She is alert and oriented to person, place, and time. Gait normal.   Skin: Skin is warm and dry.   Psychiatric: Her speech is normal and behavior is normal. Thought content normal.   Nursing note and vitals reviewed.      Assessment & Plan   Diagnoses and all orders for this visit:    1. Medicare annual wellness visit, subsequent (Primary)    2. Other hyperlipidemia  -     Comprehensive Metabolic Panel  -     CK  -     Lipid Panel With LDL / HDL Ratio    3. Elevated fasting glucose  -     Comprehensive Metabolic Panel  -     Hemoglobin A1c  -     Urinalysis With Culture If Indicated -    4. Vitamin D deficiency  -      Comprehensive Metabolic Panel  -     Vitamin D,25-Hydroxy  -     TSH  -     T4, free  -     T3, Free    5. Macrocytosis  -     CBC & Differential  -     Iron and TIBC  -     Ferritin  -     Vitamin B12 & Folate  -     Vitamin B1, Whole Blood  -     Vitamin B2  -     Vitamin B6  -     Vitamin B7 (Biotin)  -     TSH  -     T4, free  -     T3, Free    6. B12 deficiency  -     CBC & Differential  -     Iron and TIBC  -     Ferritin  -     Vitamin B12 & Folate  -     Vitamin B1, Whole Blood  -     Vitamin B2  -     Vitamin B7 (Biotin)    7. Abnormal renal function  -     Comprehensive Metabolic Panel  -     Urinalysis With Culture If Indicated -    8. Kidney stones  -     Urinalysis With Culture If Indicated -    9. Gastroesophageal reflux disease with esophagitis without hemorrhage  -     Ambulatory Referral to Gastroenterology    10. Esophageal stricture  -     Ambulatory Referral to Gastroenterology    11. Family history of GI malignancy  -     Ambulatory Referral to Gastroenterology    12. Depression with anxiety    13. Dizziness  -     CBC & Differential  -     Comprehensive Metabolic Panel  -     Hemoglobin A1c  -     Lipid Panel With LDL / HDL Ratio  -     Iron and TIBC  -     Ferritin  -     Vitamin B12 & Folate  -     Vitamin B1, Whole Blood  -     Vitamin B2  -     Vitamin B6  -     Vitamin B7 (Biotin)  -     TSH  -     T4, free  -     T3, Free  -     Duplex Carotid Ultrasound CAR; Future    14. Tinnitus, unspecified laterality    15. Ataxia  -     CBC & Differential  -     Comprehensive Metabolic Panel  -     Hemoglobin A1c  -     Lipid Panel With LDL / HDL Ratio  -     Iron and TIBC  -     Ferritin  -     Vitamin B12 & Folate  -     Vitamin B1, Whole Blood  -     Vitamin B2  -     Vitamin B6  -     Vitamin B7 (Biotin)  -     TSH  -     T4, free  -     T3, Free  -     Duplex Carotid Ultrasound CAR; Future    16. Abnormal gait  -     CBC & Differential  -     Comprehensive Metabolic Panel  -     Hemoglobin  A1c  -     Lipid Panel With LDL / HDL Ratio  -     Iron and TIBC  -     Ferritin  -     Vitamin B12 & Folate  -     Vitamin B1, Whole Blood  -     Vitamin B2  -     Vitamin B6  -     Vitamin B7 (Biotin)  -     TSH  -     T4, free  -     T3, Free  -     Duplex Carotid Ultrasound CAR; Future    17. Acute left-sided low back pain without sciatica    18. Chronic bilateral low back pain with sciatica, sciatica laterality unspecified    19. Degenerative disc disease, lumbar    20. Facet arthropathy, cervical    21. Decreased anal sphincter tone    22. Vertebrobasilar insufficiency  -     Duplex Carotid Ultrasound CAR; Future    23. Arthritis    24. Bilateral chronic knee pain    25. CMC arthritis    26. Laceration of digital nerve of finger, sequela    27. Neck pain on left side    28. Plantar fasciitis    29. Stress fracture of metatarsal bone of right foot, sequela    30. Atherosclerosis of both carotid arteries  -     Duplex Carotid Ultrasound CAR; Future    31. Encounter for screening mammogram for malignant neoplasm of breast  -     Mammo Screening Digital Tomosynthesis Bilateral With CAD; Future    32. Abnormal finding of blood chemistry, unspecified  -     Iron and TIBC  -     Ferritin         Assessment and plan  AWV completed today. She due for mammogram 6/2024 and DEXA 6/2025. She declines colonoscopy- She is up to date on Cologuard until 2026. She should contact pharmacy about updating Pneumovax or Prevnar 20 and RSV vaccination.      Patient will have fasting labs. Call if no results in 1 week. Stability of conditions, plan, follow up, and further recommendations pending labs.  If stable, follow-up in 6 months.    Hyperlipidemia-Last lipids were up about 30 points. Continue atorvastatin 10 mg at bedtime.  Further treatment recommendations pending labs.     Vitamin D deficiency-Continue vitamin D 2000 IU daily. Dosing recommendations following labs.   B12 deficiency-Continue B12 500 mcg daily. Await labs for  further recommendations.    Abnormal renal function- Patient should avoid NSAIDs and ensure proper hydration.  Unfortunately, she is taking naproxen as needed for pain.  I will recheck labs and make further recommendations.  GERD, history of esophageal stricture-Controlled symptoms as long as she does not miss medication but has symptoms with any missed doses.  She did have 1 episode of hamburger getting stuck in her throat. Continue Prilosec 20 mg once daily. Patient needs to see GI in follow-up for consideration of repeat EGD and to see if they recommend a colonoscopy for screening rather than Cologuard.  Depression with anxiety- Moods were uncontrolled with increased stressors and irritability. She has had improvement with Prozac 20 mg twice daily.  Continue current dose.  To be seen ASAP if worsening moods or AE with medication.   Dizziness/ Tinnitus- I referred for MRI brain and IAC as well as carotid duplex with some sinus disease but otherwise negative. CTA head and neck to ensure no cerbrovascular etiology of symptoms and advised BPPV physical therapy/ exercises. She was seen by ENT who thought there could be a vascular etiology. They ordered VNG- no etiology of symptoms. She was seen by neurology who also thought some cerebrovascular disease and advised Aspirin 325 mg daily and sleep study.  She had possible abnormal CTA head and neck and was referred to neurosurgery.  She was seen by neurosurgery and underwent angiogram without etiology. MRI cervical spine with mild to moderate OA and DDD with spinal stenosis and possible left nerve root involvement.  She was seen again by neurosurgery without recommendations for intervention.  I will repeat carotid duplex and check additional labs today.  If no etiology, I will refer back to neurology for evaluation.  Consideration of tertiary center if no etiology, as her dizziness and gait abnormality limit her life.    Cervical spine DDD, facet arthropathy, OA- MRI  2/2022 with mild to moderate OA and degenerative changes, minimal spinal stenosis at C5-6, and possible left nerve root involvement of C6 left nerve root.  She was seen by neurosurgery with no intervention recommended.  To be seen if worsening, new or changing symptoms.  Back pain, scoliosis, DJD and DDD lumbar spine, spinal stenosis- Stable-she was seen by neurosurgery with no intervention recommended.  To be seen ASAP if worsening, new or changing symptoms.  Arthritis-Stable- to continue Tylenol as needed.  I have counseled her on my recommendations to avoid naproxen if possible.  To see hand surgery or orthopedic surgery if worsening.  Fecal incontinence- If recurrence of symptoms or other symptoms, consider follow up with GI, the need for MRI lumbosacral region, consideration of further neurological work-up, as well as consideration of pelvic floor strengthening PT.  She should let me know if she is willing to proceed with further work-up.    I spent 45 minutes caring for Virginia Leiva on this date of service, including 15 minutes for AWV and 30 minutes for follow up and problem focussed visit. This time includes time spent by me in the following activities: preparing for the visit, reviewing tests, specialists records, and previous visits, obtaining and/or reviewing a separately obtained history, performing a medically appropriate examination and/or evaluation, counseling and educating the patient/family/caregiver, referring and/or communicating with other health care professionals as necessary, documenting information in the medical record, independently interpreting results and communicating that information with the patient/family/caregiver, and developing a medically appropriate treatment plan with consideration of other conditions, medications, and treatments.

## 2024-05-17 NOTE — PROGRESS NOTES
"My name is Luke Reyes     I am the Practice Manager with   [facility] [address] [address] [phone]      I am messaging to ask you about our practice and your recent visit.     Tell me about your visit with us. What things went well?         We're always looking for ways to make our patients' experiences even better. Do you have recommendations on ways we may improve?       Overall were you satisfied with your first visit to our practice?        Is there anything else I can do for you?     Also, please note that you may receive a survey from \"[employer]\" please take time to fill that out, as it provides important feedback for our office.       Thank you, and have a great day.   "

## 2024-05-17 NOTE — PROGRESS NOTES
The ABCs of the Annual Wellness Visit  Initial Medicare Wellness Visit    Subjective     Virginia Leiva is a 68 y.o. female who presents for an Initial Medicare Wellness Visit.    Last Pap- hysterectomy- 2007. No hx abnormal PAP  Last mammogram- 6/2023-negative.  Ordered 2018- not performed  Last DEXA-6/2023-osteopenia.-Lumbar spine T-score -0.3, left hip T-score -1.0, right hip T-score -1.2.  10-year risk of major osteoporotic fracture 7.5%, 10-year risk hip fracture 0.9%.  Advised increase dietary calcium and weightbearing exercise.  Recheck 2 years.  Last colonoscopy- had colonoscopy x 1- no polyp. No FHx colon cancer.  Cologuard - 3/2023.  EGD 5/2019--Dr. Covarrubias-medium hiatal hernia, mucosal changes middle third of the esophagus and lower third of the esophagus.  Repeat EGD 3 to 5 years.  Last Tdap- 7/2015  Prevnar- 1/2019  Pneumovax- has not had  Hepatitis A- 5/2018, 11/2018  Last flu shot- 10/2023.   Shingrix- had varicella as a child. Shingrix 7/2021, 9/2021  Covid 19-  Pfizer- 3/2021, 4/2021, 10/2021, 4/2022, moderna bivalent 10/2022, Spikevax- 10/2023.   RSV-has not had vaccination    The following portions of the patient's history were reviewed and   updated as appropriate: allergies, current medications, past family history, past medical history, past social history, past surgical history, and problem list.     Compared to one year ago, the patient feels her physical   health is the same.    Compared to one year ago, the patient feels her mental   health is the same.    Recent Hospitalizations:  She was not admitted to the hospital during the last year.       Current Medical Providers:  Patient Care Team:  Kacey Hernadez PA as PCP - General (Family Medicine)  Maury Julian DPM as Consulting Physician (Podiatry)    Outpatient Medications Prior to Visit   Medication Sig Dispense Refill    aspirin 325 MG tablet Take 1 tablet by mouth Daily.      atorvastatin (LIPITOR) 10 MG tablet TAKE 1 TABLET EVERY  NIGHT (NO REFILLS. MUST MAKE APPOINTMENT CALL OFFICE) 90 tablet 1    cholecalciferol (VITAMIN D3) 25 MCG (1000 UT) tablet       cyclobenzaprine (FLEXERIL) 10 MG tablet Take 1 tablet by mouth 3 (Three) Times a Day As Needed for Muscle Spasms. 30 tablet 0    FLUoxetine (PROzac) 20 MG capsule TAKE 1 CAPSULE TWICE DAILY 180 capsule 0    folic acid (FOLVITE) 1 MG tablet Take 1 tablet by mouth Daily.      LOPERAMIDE HCL PO Take  by mouth As Needed.      omeprazole (priLOSEC) 20 MG capsule Take 1 capsule by mouth Daily. 90 capsule 0    vitamin B-12 (CYANOCOBALAMIN) 1000 MCG tablet Take 1 tablet by mouth 2 (Two) Times a Week.      erythromycin (ROMYCIN) 5 MG/GM ophthalmic ointment Administer  into the left eye Every Night. 3.5 g 0    methylPREDNISolone (Medrol) 4 MG dose pack follow package directions 21 tablet 0    nitrofurantoin, macrocrystal-monohydrate, (Macrobid) 100 MG capsule Take 1 capsule by mouth 2 (Two) Times a Day. 14 capsule 0     No facility-administered medications prior to visit.       No opioid medication identified on active medication list. I have reviewed chart for other potential  high risk medication/s and harmful drug interactions in the elderly.        Aspirin is on active medication list. Aspirin use is indicated based on review of current medical condition/s. Pros and cons of this therapy have been discussed today. Benefits of this medication outweigh potential harm.  Patient has been encouraged to continue taking this medication.  .      Patient Active Problem List   Diagnosis    GERD (gastroesophageal reflux disease)    CMC arthritis    Anxiety    Esophageal stricture    Plantar fasciitis    Vitamin D deficiency    Other hyperlipidemia    Family history of GI malignancy    Macrocytosis    Bilateral chronic knee pain    Abnormal EKG    Digital nerve laceration, finger    Vertebrobasilar insufficiency    Dizziness    Ataxia    Stress fracture of metatarsal bone of right foot    Kidney stones    B12  "deficiency    Depression with anxiety    Atherosclerosis of both carotid arteries    Tinnitus    Chronic bilateral low back pain with sciatica    Degenerative disc disease, lumbar    Neck pain on left side    Degenerative disc disease, cervical    Facet arthropathy, cervical    Arthritis    Elevated fasting glucose    Abnormal renal function     Advance Care Planning  Advance Directive is on file.  ACP discussion was held with the patient during this visit. Patient has an advance directive in EMR which is still valid.        Objective    Vitals:    24 1018   BP: 128/86   Pulse: 80   Resp: 18   Temp: 98.5 °F (36.9 °C)   TempSrc: Temporal   SpO2: 99%   Weight: 91.6 kg (202 lb)   Height: 160 cm (62.99\")     Estimated body mass index is 35.79 kg/m² as calculated from the following:    Height as of this encounter: 160 cm (62.99\").    Weight as of this encounter: 91.6 kg (202 lb).    Class 2 Severe Obesity (BMI >=35 and <=39.9). Obesity-related health conditions include the following: dyslipidemias, GERD, and osteoarthritis. Obesity is unchanged. BMI is is above average; BMI management plan is completed. We discussed portion control and increasing exercise.      Does the patient have evidence of cognitive impairment?   No            HEALTH RISK ASSESSMENT    Smoking Status:  Social History     Tobacco Use   Smoking Status Never   Smokeless Tobacco Never     Alcohol Consumption:  Social History     Substance and Sexual Activity   Alcohol Use Not Currently     Fall Risk Screen:    STEADI Fall Risk Assessment was completed, and patient is at LOW risk for falls.Assessment completed on:2024    Depression Screen:       2024    10:21 AM   PHQ-2/PHQ-9 Depression Screening   Little Interest or Pleasure in Doing Things 0-->not at all   Feeling Down, Depressed or Hopeless 0-->not at all   PHQ-9: Brief Depression Severity Measure Score 0       Health Habits and Functional and Cognitive Screenin/17/2024    " 10:00 AM   Functional & Cognitive Status   Do you have difficulty preparing food and eating? No   Do you have difficulty bathing yourself, getting dressed or grooming yourself? No   Do you have difficulty using the toilet? No   Do you have difficulty moving around from place to place? No   Do you have trouble with steps or getting out of a bed or a chair? No   Current Diet Well Balanced Diet   Dental Exam Up to date   Eye Exam Not up to date   Exercise (times per week) 0 times per week   Current Exercises Include No Regular Exercise   Do you need help using the phone?  No   Are you deaf or do you have serious difficulty hearing?  No   Do you need help to go to places out of walking distance? No   Do you need help shopping? No   Do you need help preparing meals?  No   Do you need help with housework?  No   Do you need help with laundry? No   Do you need help taking your medications? No   Do you need help managing money? No   Do you ever drive or ride in a car without wearing a seat belt? No   Have you felt unusual stress, anger or loneliness in the last month? No   Who do you live with? Spouse   If you need help, do you have trouble finding someone available to you? No   Have you been bothered in the last four weeks by sexual problems? No   Do you have difficulty concentrating, remembering or making decisions? No       Age-appropriate Screening Schedule:  Refer to the list below for future screening recommendations based on patient's age, sex and/or medical conditions. Orders for these recommended tests are listed in the plan section. The patient has been provided with a written plan.    Health Maintenance   Topic Date Due    PAP SMEAR  Never done    COLORECTAL CANCER SCREENING  04/04/2023    ANNUAL WELLNESS VISIT  03/03/2024    Pneumococcal Vaccine 65+ (2 of 2 - PPSV23 or PCV20) 09/29/2024 (Originally 8/6/2021)    COVID-19 Vaccine (7 - 2023-24 season) 05/15/2025 (Originally 2/16/2024)    RSV Vaccine - Adults (1 -  1-dose 60+ series) 05/17/2025 (Originally 8/6/2016)    INFLUENZA VACCINE  08/01/2024    LIPID PANEL  10/02/2024    BMI FOLLOWUP  05/17/2025    MAMMOGRAM  06/23/2025    DXA SCAN  06/23/2025    TDAP/TD VACCINES (2 - Td or Tdap) 07/08/2025    HEPATITIS C SCREENING  Completed    ZOSTER VACCINE  Completed          CMS Preventative Services Quick Reference  Risk Factors Identified During Encounter    Immunizations Discussed/Encouraged:  pneumovax    The above risks/problems have been discussed with the patient.  Pertinent information has been shared with the patient in the After Visit Summary.  An After Visit Summary and PPPS were made available to the patient.  Diagnoses and all orders for this visit:    1. Medicare annual wellness visit, subsequent (Primary)    2. Other hyperlipidemia  -     Comprehensive Metabolic Panel  -     CK  -     Lipid Panel With LDL / HDL Ratio    3. Elevated fasting glucose  -     Comprehensive Metabolic Panel  -     Hemoglobin A1c  -     Urinalysis With Culture If Indicated -    4. Vitamin D deficiency  -     Comprehensive Metabolic Panel  -     Vitamin D,25-Hydroxy  -     TSH  -     T4, free  -     T3, Free    5. Macrocytosis  -     CBC & Differential  -     Iron and TIBC  -     Ferritin  -     Vitamin B12 & Folate  -     Vitamin B1, Whole Blood  -     Vitamin B2  -     Vitamin B6  -     Vitamin B7 (Biotin)  -     TSH  -     T4, free  -     T3, Free    6. B12 deficiency  -     CBC & Differential  -     Iron and TIBC  -     Ferritin  -     Vitamin B12 & Folate  -     Vitamin B1, Whole Blood  -     Vitamin B2  -     Vitamin B7 (Biotin)    7. Abnormal renal function  -     Comprehensive Metabolic Panel  -     Urinalysis With Culture If Indicated -    8. Kidney stones  -     Urinalysis With Culture If Indicated -    9. Gastroesophageal reflux disease with esophagitis without hemorrhage  -     Ambulatory Referral to Gastroenterology    10. Esophageal stricture  -     Ambulatory Referral to  Gastroenterology    11. Family history of GI malignancy  -     Ambulatory Referral to Gastroenterology    12. Depression with anxiety    13. Dizziness  -     CBC & Differential  -     Comprehensive Metabolic Panel  -     Hemoglobin A1c  -     Lipid Panel With LDL / HDL Ratio  -     Iron and TIBC  -     Ferritin  -     Vitamin B12 & Folate  -     Vitamin B1, Whole Blood  -     Vitamin B2  -     Vitamin B6  -     Vitamin B7 (Biotin)  -     TSH  -     T4, free  -     T3, Free  -     Duplex Carotid Ultrasound CAR; Future    14. Tinnitus, unspecified laterality    15. Ataxia  -     CBC & Differential  -     Comprehensive Metabolic Panel  -     Hemoglobin A1c  -     Lipid Panel With LDL / HDL Ratio  -     Iron and TIBC  -     Ferritin  -     Vitamin B12 & Folate  -     Vitamin B1, Whole Blood  -     Vitamin B2  -     Vitamin B6  -     Vitamin B7 (Biotin)  -     TSH  -     T4, free  -     T3, Free  -     Duplex Carotid Ultrasound CAR; Future    16. Abnormal gait  -     CBC & Differential  -     Comprehensive Metabolic Panel  -     Hemoglobin A1c  -     Lipid Panel With LDL / HDL Ratio  -     Iron and TIBC  -     Ferritin  -     Vitamin B12 & Folate  -     Vitamin B1, Whole Blood  -     Vitamin B2  -     Vitamin B6  -     Vitamin B7 (Biotin)  -     TSH  -     T4, free  -     T3, Free  -     Duplex Carotid Ultrasound CAR; Future    17. Acute left-sided low back pain without sciatica    18. Chronic bilateral low back pain with sciatica, sciatica laterality unspecified    19. Degenerative disc disease, lumbar    20. Facet arthropathy, cervical    21. Decreased anal sphincter tone    22. Vertebrobasilar insufficiency  -     Duplex Carotid Ultrasound CAR; Future    23. Arthritis    24. Bilateral chronic knee pain    25. CMC arthritis    26. Laceration of digital nerve of finger, sequela    27. Neck pain on left side    28. Plantar fasciitis    29. Stress fracture of metatarsal bone of right foot, sequela    30. Atherosclerosis  of both carotid arteries  -     Duplex Carotid Ultrasound CAR; Future    31. Encounter for screening mammogram for malignant neoplasm of breast  -     Mammo Screening Digital Tomosynthesis Bilateral With CAD; Future    32. Abnormal finding of blood chemistry, unspecified  -     Iron and TIBC  -     Ferritin      Follow Up:  Next Medicare Wellness visit to be scheduled in 1 year.

## 2024-06-02 LAB
25(OH)D3+25(OH)D2 SERPL-MCNC: 35.4 NG/ML (ref 30–100)
ALBUMIN SERPL-MCNC: 4.1 G/DL (ref 3.9–4.9)
ALBUMIN/GLOB SERPL: 1.6 {RATIO} (ref 1.2–2.2)
ALP SERPL-CCNC: 95 IU/L (ref 44–121)
ALT SERPL-CCNC: 17 IU/L (ref 0–32)
AST SERPL-CCNC: 15 IU/L (ref 0–40)
BASOPHILS # BLD AUTO: 0.1 X10E3/UL (ref 0–0.2)
BASOPHILS NFR BLD AUTO: 1 %
BILIRUB SERPL-MCNC: 0.5 MG/DL (ref 0–1.2)
BIOTIN SERPL-MCNC: <0.05 NG/ML (ref 0.05–0.83)
BUN SERPL-MCNC: 21 MG/DL (ref 8–27)
BUN/CREAT SERPL: 21 (ref 12–28)
CALCIUM SERPL-MCNC: 9.3 MG/DL (ref 8.7–10.3)
CHLORIDE SERPL-SCNC: 106 MMOL/L (ref 96–106)
CHOLEST SERPL-MCNC: 176 MG/DL (ref 100–199)
CK SERPL-CCNC: 74 U/L (ref 32–182)
CO2 SERPL-SCNC: 24 MMOL/L (ref 20–29)
CREAT SERPL-MCNC: 0.99 MG/DL (ref 0.57–1)
EGFRCR SERPLBLD CKD-EPI 2021: 62 ML/MIN/1.73
EOSINOPHIL # BLD AUTO: 0.2 X10E3/UL (ref 0–0.4)
EOSINOPHIL NFR BLD AUTO: 3 %
ERYTHROCYTE [DISTWIDTH] IN BLOOD BY AUTOMATED COUNT: 12.6 % (ref 11.7–15.4)
FERRITIN SERPL-MCNC: 57 NG/ML (ref 15–150)
FOLATE SERPL-MCNC: 14.5 NG/ML
GLOBULIN SER CALC-MCNC: 2.5 G/DL (ref 1.5–4.5)
GLUCOSE SERPL-MCNC: 99 MG/DL (ref 70–99)
HBA1C MFR BLD: 5.8 % (ref 4.8–5.6)
HCT VFR BLD AUTO: 42.5 % (ref 34–46.6)
HDLC SERPL-MCNC: 56 MG/DL
HGB BLD-MCNC: 13.9 G/DL (ref 11.1–15.9)
IMM GRANULOCYTES # BLD AUTO: 0 X10E3/UL (ref 0–0.1)
IMM GRANULOCYTES NFR BLD AUTO: 0 %
IRON SATN MFR SERPL: 17 % (ref 15–55)
IRON SERPL-MCNC: 56 UG/DL (ref 27–139)
LDLC SERPL CALC-MCNC: 103 MG/DL (ref 0–99)
LDLC/HDLC SERPL: 1.8 RATIO (ref 0–3.2)
LYMPHOCYTES # BLD AUTO: 1.9 X10E3/UL (ref 0.7–3.1)
LYMPHOCYTES NFR BLD AUTO: 31 %
MCH RBC QN AUTO: 30.4 PG (ref 26.6–33)
MCHC RBC AUTO-ENTMCNC: 32.7 G/DL (ref 31.5–35.7)
MCV RBC AUTO: 93 FL (ref 79–97)
MONOCYTES # BLD AUTO: 0.4 X10E3/UL (ref 0.1–0.9)
MONOCYTES NFR BLD AUTO: 6 %
NEUTROPHILS # BLD AUTO: 3.5 X10E3/UL (ref 1.4–7)
NEUTROPHILS NFR BLD AUTO: 59 %
PLATELET # BLD AUTO: 172 X10E3/UL (ref 150–450)
POTASSIUM SERPL-SCNC: 4.4 MMOL/L (ref 3.5–5.2)
PROT SERPL-MCNC: 6.6 G/DL (ref 6–8.5)
PYRIDOXAL PHOS SERPL-MCNC: 7 UG/L (ref 3.4–65.2)
RBC # BLD AUTO: 4.57 X10E6/UL (ref 3.77–5.28)
SODIUM SERPL-SCNC: 142 MMOL/L (ref 134–144)
T3FREE SERPL-MCNC: 2.8 PG/ML (ref 2–4.4)
T4 FREE SERPL-MCNC: 1.16 NG/DL (ref 0.82–1.77)
TIBC SERPL-MCNC: 330 UG/DL (ref 250–450)
TRIGL SERPL-MCNC: 91 MG/DL (ref 0–149)
TSH SERPL DL<=0.005 MIU/L-ACNC: 1.01 UIU/ML (ref 0.45–4.5)
UIBC SERPL-MCNC: 274 UG/DL (ref 118–369)
UNABLE TO VOID: NORMAL
VIT B1 BLD-SCNC: 120.8 NMOL/L (ref 66.5–200)
VIT B12 SERPL-MCNC: 926 PG/ML (ref 232–1245)
VIT B2 BLD-MCNC: 274 UG/L (ref 137–370)
VLDLC SERPL CALC-MCNC: 17 MG/DL (ref 5–40)
WBC # BLD AUTO: 6.1 X10E3/UL (ref 3.4–10.8)

## 2024-06-07 ENCOUNTER — HOSPITAL ENCOUNTER (OUTPATIENT)
Dept: CARDIOLOGY | Facility: HOSPITAL | Age: 69
Discharge: HOME | End: 2024-06-07
Admitting: PHYSICIAN ASSISTANT
Payer: MEDICARE

## 2024-06-07 DIAGNOSIS — R42 DIZZINESS: ICD-10-CM

## 2024-06-07 DIAGNOSIS — R27.0 ATAXIA: ICD-10-CM

## 2024-06-07 DIAGNOSIS — I65.23 ATHEROSCLEROSIS OF BOTH CAROTID ARTERIES: ICD-10-CM

## 2024-06-07 DIAGNOSIS — G45.0 VERTEBROBASILAR INSUFFICIENCY: ICD-10-CM

## 2024-06-07 DIAGNOSIS — R26.9 ABNORMAL GAIT: ICD-10-CM

## 2024-06-07 LAB
BH CV XLRA MEAS LEFT DIST CCA EDV: -17.6 CM/SEC
BH CV XLRA MEAS LEFT DIST CCA PSV: -62.6 CM/SEC
BH CV XLRA MEAS LEFT DIST ICA EDV: -22.8 CM/SEC
BH CV XLRA MEAS LEFT DIST ICA PSV: -66.3 CM/SEC
BH CV XLRA MEAS LEFT ICA/CCA RATIO: 1.28
BH CV XLRA MEAS LEFT MID ICA EDV: -21.1 CM/SEC
BH CV XLRA MEAS LEFT MID ICA PSV: -55.3 CM/SEC
BH CV XLRA MEAS LEFT PROX CCA EDV: 21.2 CM/SEC
BH CV XLRA MEAS LEFT PROX CCA PSV: 134.1 CM/SEC
BH CV XLRA MEAS LEFT PROX ECA EDV: -11.2 CM/SEC
BH CV XLRA MEAS LEFT PROX ECA PSV: -81.4 CM/SEC
BH CV XLRA MEAS LEFT PROX ICA EDV: -23 CM/SEC
BH CV XLRA MEAS LEFT PROX ICA PSV: -80.1 CM/SEC
BH CV XLRA MEAS LEFT PROX SCLA PSV: 171.2 CM/SEC
BH CV XLRA MEAS LEFT VERTEBRAL A EDV: 13.2 CM/SEC
BH CV XLRA MEAS LEFT VERTEBRAL A PSV: 65.4 CM/SEC
BH CV XLRA MEAS RIGHT DIST CCA EDV: 24.1 CM/SEC
BH CV XLRA MEAS RIGHT DIST CCA PSV: 78.1 CM/SEC
BH CV XLRA MEAS RIGHT DIST ICA EDV: -32.3 CM/SEC
BH CV XLRA MEAS RIGHT DIST ICA PSV: -91.9 CM/SEC
BH CV XLRA MEAS RIGHT ICA/CCA RATIO: 1.18
BH CV XLRA MEAS RIGHT MID ICA EDV: -22.1 CM/SEC
BH CV XLRA MEAS RIGHT MID ICA PSV: -69.3 CM/SEC
BH CV XLRA MEAS RIGHT PROX CCA EDV: -14.7 CM/SEC
BH CV XLRA MEAS RIGHT PROX CCA PSV: -68.3 CM/SEC
BH CV XLRA MEAS RIGHT PROX ECA EDV: -14.9 CM/SEC
BH CV XLRA MEAS RIGHT PROX ECA PSV: -97.5 CM/SEC
BH CV XLRA MEAS RIGHT PROX ICA EDV: -16.2 CM/SEC
BH CV XLRA MEAS RIGHT PROX ICA PSV: -65.4 CM/SEC
BH CV XLRA MEAS RIGHT PROX SCLA PSV: 106.6 CM/SEC
BH CV XLRA MEAS RIGHT VERTEBRAL A EDV: 23 CM/SEC
BH CV XLRA MEAS RIGHT VERTEBRAL A PSV: 86.4 CM/SEC
LEFT ARM BP: NORMAL MMHG
RIGHT ARM BP: NORMAL MMHG

## 2024-06-07 PROCEDURE — 93880 EXTRACRANIAL BILAT STUDY: CPT

## 2024-06-28 ENCOUNTER — HOSPITAL ENCOUNTER (OUTPATIENT)
Dept: MAMMOGRAPHY | Facility: HOSPITAL | Age: 69
Discharge: HOME | End: 2024-06-28
Admitting: PHYSICIAN ASSISTANT
Payer: MEDICARE

## 2024-06-28 DIAGNOSIS — Z12.31 ENCOUNTER FOR SCREENING MAMMOGRAM FOR MALIGNANT NEOPLASM OF BREAST: ICD-10-CM

## 2024-06-28 PROCEDURE — 77067 SCR MAMMO BI INCL CAD: CPT

## 2024-06-28 PROCEDURE — 77063 BREAST TOMOSYNTHESIS BI: CPT

## 2024-07-01 DIAGNOSIS — R92.30 INCONCLUSIVE MAMMOGRAM DUE TO DENSE BREASTS: Primary | ICD-10-CM

## 2024-07-01 DIAGNOSIS — R92.8 ABNORMAL MAMMOGRAM OF BOTH BREASTS: ICD-10-CM

## 2024-07-01 DIAGNOSIS — R92.2 INCONCLUSIVE MAMMOGRAM DUE TO DENSE BREASTS: Primary | ICD-10-CM

## 2024-07-24 DIAGNOSIS — K21.9 GASTROESOPHAGEAL REFLUX DISEASE: ICD-10-CM

## 2024-07-24 DIAGNOSIS — K22.2 ESOPHAGEAL STRICTURE: ICD-10-CM

## 2024-07-25 ENCOUNTER — TELEPHONE (OUTPATIENT)
Dept: GASTROENTEROLOGY | Facility: CLINIC | Age: 69
End: 2024-07-25
Payer: MEDICARE

## 2024-07-25 RX ORDER — OMEPRAZOLE 20 MG/1
20 CAPSULE, DELAYED RELEASE ORAL DAILY
Qty: 90 CAPSULE | Refills: 0 | Status: SHIPPED | OUTPATIENT
Start: 2024-07-25

## 2024-07-25 NOTE — TELEPHONE ENCOUNTER
----- Message from [facility] sent at 7/24/2024 10:33 PM EDT -----  Regarding: Appointment Request  Contact: [phone]  Appointment Request From: Virginia Leiva    With Provider: Le Loera [[facility] GASTROENTEROLOGY]    Preferred Date Range: 8/2/2024 – 8/9/2024    Preferred Times: Any Time    Reason for visit: Follow-up    Comments:  I am having periodical bleeding from my anus that l think needs checking out prior to my Nov 13 appointment

## 2024-07-25 NOTE — TELEPHONE ENCOUNTER
Sent patient message advising of no sooner new patient appts, but I would send a message to see if Dr Loera could work this patient in. Please advise.

## 2024-07-25 NOTE — TELEPHONE ENCOUNTER
I have called the pt and passed on Nadja's recommendations and I have advised she reach out to her PCP.  Pt verbalized understanding.

## 2024-08-16 ENCOUNTER — HOSPITAL ENCOUNTER (OUTPATIENT)
Dept: MAMMOGRAPHY | Facility: HOSPITAL | Age: 69
Discharge: HOME | End: 2024-08-16
Payer: MEDICARE

## 2024-08-16 ENCOUNTER — HOSPITAL ENCOUNTER (OUTPATIENT)
Dept: ULTRASOUND IMAGING | Facility: HOSPITAL | Age: 69
Discharge: HOME | End: 2024-08-16
Payer: MEDICARE

## 2024-08-16 DIAGNOSIS — R92.8 ABNORMAL MAMMOGRAM OF BOTH BREASTS: ICD-10-CM

## 2024-08-16 PROCEDURE — 77066 DX MAMMO INCL CAD BI: CPT

## 2024-08-16 PROCEDURE — G0279 TOMOSYNTHESIS, MAMMO: HCPCS

## 2024-08-16 PROCEDURE — 76642 ULTRASOUND BREAST LIMITED: CPT

## 2024-09-12 DIAGNOSIS — E78.49 OTHER HYPERLIPIDEMIA: ICD-10-CM

## 2024-09-13 RX ORDER — ATORVASTATIN CALCIUM 10 MG/1
10 TABLET, FILM COATED ORAL NIGHTLY
Qty: 90 TABLET | Refills: 1 | Status: SHIPPED | OUTPATIENT
Start: 2024-09-13

## 2024-10-18 DIAGNOSIS — K22.2 ESOPHAGEAL STRICTURE: ICD-10-CM

## 2024-10-18 DIAGNOSIS — K21.9 GASTROESOPHAGEAL REFLUX DISEASE: ICD-10-CM

## 2024-11-13 ENCOUNTER — PREP FOR SURGERY (OUTPATIENT)
Dept: OTHER | Facility: HOSPITAL | Age: 69
End: 2024-11-13
Payer: MEDICARE

## 2024-11-13 ENCOUNTER — OFFICE VISIT (OUTPATIENT)
Dept: GASTROENTEROLOGY | Facility: CLINIC | Age: 69
End: 2024-11-13
Payer: MEDICARE

## 2024-11-13 ENCOUNTER — TELEPHONE (OUTPATIENT)
Dept: GASTROENTEROLOGY | Facility: CLINIC | Age: 69
End: 2024-11-13

## 2024-11-13 VITALS
BODY MASS INDEX: 35.65 KG/M2 | TEMPERATURE: 96.4 F | WEIGHT: 201.2 LBS | DIASTOLIC BLOOD PRESSURE: 76 MMHG | HEART RATE: 70 BPM | HEIGHT: 63 IN | SYSTOLIC BLOOD PRESSURE: 148 MMHG

## 2024-11-13 DIAGNOSIS — K44.9 HIATAL HERNIA: ICD-10-CM

## 2024-11-13 DIAGNOSIS — K21.9 GERD WITHOUT ESOPHAGITIS: Primary | ICD-10-CM

## 2024-11-13 DIAGNOSIS — K21.9 GASTROESOPHAGEAL REFLUX DISEASE, UNSPECIFIED WHETHER ESOPHAGITIS PRESENT: Primary | ICD-10-CM

## 2024-11-13 PROCEDURE — 99203 OFFICE O/P NEW LOW 30 MIN: CPT | Performed by: INTERNAL MEDICINE

## 2024-11-13 PROCEDURE — 1160F RVW MEDS BY RX/DR IN RCRD: CPT | Performed by: INTERNAL MEDICINE

## 2024-11-13 PROCEDURE — 1159F MED LIST DOCD IN RCRD: CPT | Performed by: INTERNAL MEDICINE

## 2024-11-13 NOTE — PROGRESS NOTES
Subjective   Chief Complaint   Patient presents with    Heartburn       Virginia Leiva is a  69 y.o. female here for heartburn.  All problems are new to me today.  Patient is a previous patient of Dr. Yobani Covarrubias.      EGD 2018 medium size hiatal hernia is noted.    She is well controlled with once daily omeprazole.  She denies any difficulty swallowing, nausea vomiting or abdominal pain.    Negative cologuard 2023.  Kasandra history of colon cancer or polyps.      HPI  Past Medical History:   Diagnosis Date    Anxiety     Arthritis     GERD (gastroesophageal reflux disease)     Glaucoma     History of kidney infection     Hyperlipidemia     Kidney stones     Migraine      Past Surgical History:   Procedure Laterality Date    BLADDER REPAIR  2008    tack     COLONOSCOPY      ENDOSCOPY      2008    ENDOSCOPY N/A 05/18/2018    Procedure: ESOPHAGOGASTRODUODENOSCOPY WITH COLD BIOPSIES;  Surgeon: Yobani Covarrubias MD;  Location[facility] ENDOSCOPY;  Service: Gastroenterology    FINGER NERVE REPAIR      HAND SURGERY      HYSTERECTOMY  2007    with bladder tack     OOPHORECTOMY      TUBAL ABDOMINAL LIGATION  1984    UPPER GASTROINTESTINAL ENDOSCOPY  2018    Done at [facility]       Current Outpatient Medications:     aspirin 325 MG tablet, Take 1 tablet by mouth Daily., Disp: , Rfl:     atorvastatin (LIPITOR) 10 MG tablet, Take 1 tablet by mouth Every Night., Disp: 90 tablet, Rfl: 1    cholecalciferol (VITAMIN D3) 25 MCG (1000 UT) tablet, , Disp: , Rfl:     cyclobenzaprine (FLEXERIL) 10 MG tablet, Take 1 tablet by mouth 3 (Three) Times a Day As Needed for Muscle Spasms., Disp: 30 tablet, Rfl: 0    FLUoxetine (PROzac) 20 MG capsule, TAKE 1 CAPSULE TWICE DAILY, Disp: 180 capsule, Rfl: 0    folic acid (FOLVITE) 1 MG tablet, Take 1 tablet by mouth Daily., Disp: , Rfl:     LOPERAMIDE HCL PO, Take  by mouth As Needed., Disp: , Rfl:     omeprazole (priLOSEC) 20 MG capsule, TAKE 1 CAPSULE EVERY DAY, Disp: 90 capsule, Rfl: 0    vitamin  B-12 (CYANOCOBALAMIN) 1000 MCG tablet, Take 1 tablet by mouth 2 (Two) Times a Week., Disp: , Rfl:   PRN Meds:.  No Known Allergies  Social History     Socioeconomic History    Marital status:      Spouse name: CHAVEZ    Number of children: 2   Tobacco Use    Smoking status: Never    Smokeless tobacco: Never   Vaping Use    Vaping status: Never Used   Substance and Sexual Activity    Alcohol use: Not Currently    Drug use: No    Sexual activity: Defer     Family History   Problem Relation Age of Onset    Cancer Mother         stomach    Arthritis Mother     Stomach cancer Mother     Cancer Father         pancreatic    Aortic aneurysm Father     Cancer Paternal Grandmother         OVARIAN    Cancer Maternal Grandfather         LUNG    Malig Hyperthermia Neg Hx     Breast cancer Neg Hx      Review of Systems   Constitutional:  Negative for appetite change and unexpected weight change.   HENT:  Negative for trouble swallowing.    Gastrointestinal:  Negative for abdominal pain, nausea and vomiting.     Vitals:    11/13/24 1525   BP: 148/76   Pulse: 70   Temp: 96.4 °F (35.8 °C)         11/13/24  1525   Weight: 91.3 kg (201 lb 3.2 oz)       Objective   Physical Exam  Constitutional:       Appearance: Normal appearance. She is well-developed.   HENT:      Head: Normocephalic and atraumatic.   Eyes:      General: No scleral icterus.     Conjunctiva/sclera: Conjunctivae normal.   Pulmonary:      Effort: Pulmonary effort is normal.   Abdominal:      General: There is no distension.      Palpations: Abdomen is soft.   Musculoskeletal:      Cervical back: Normal range of motion and neck supple.   Skin:     General: Skin is warm and dry.   Neurological:      Mental Status: She is alert.   Psychiatric:         Mood and Affect: Mood normal.         Behavior: Behavior normal.       Lab Results   Component Value Date    WBC 6.1 05/24/2024    HGB 13.9 05/24/2024    HCT 42.5 05/24/2024    MCV 93 05/24/2024     05/24/2024  "    Lab Results   Component Value Date    GLUCOSE 99 05/24/2024    BUN 21 05/24/2024    CREATININE 0.99 05/24/2024    EGFRRESULT 62 05/24/2024    BCR 21 05/24/2024    K 4.4 05/24/2024    CO2 24 05/24/2024    CALCIUM 9.3 05/24/2024    PROTENTOTREF 6.6 05/24/2024    ALBUMIN 4.1 05/24/2024    BILITOT 0.5 05/24/2024    AST 15 05/24/2024    ALT 17 05/24/2024     No results found for: \"VYRQ253\"   No results found for: \"CALPROFECAL\"     Mammo Diagnostic Digital Tomosynthesis Bilateral With CAD    Result Date: 8/16/2024  No evidence of malignancy in either breast. Recommend annual screening mammogram in June 2025.   Note: In patients with heterogeneously dense or extremely dense tissue, supplemental screening breast MRI or whole breast ultrasound should be considered. Please see the findings section above for this patient's personalized breast density category.  BI-RADS Category 1: Negative    This report was finalized on 8/16/2024 10:55 AM by Dr. Kasandra Bui M.D on Workstation: [email or web address]      US Breast Bilateral Limited    Result Date: 8/16/2024  No evidence of malignancy in either breast. Recommend annual screening mammogram in June 2025.   Note: In patients with heterogeneously dense or extremely dense tissue, supplemental screening breast MRI or whole breast ultrasound should be considered. Please see the findings section above for this patient's personalized breast density category.  BI-RADS Category 1: Negative    This report was finalized on 8/16/2024 10:55 AM by Dr. Kasandra Bui M.D on Workstation: [id number]        Assessment & Plan   Diagnoses and all orders for this visit:    GERD without esophagitis    Hiatal hernia      Plan:  Medically stable on once daily omeprazole.  Recent labs reviewed-normal creatinine.  Recommend updating her EGD to evaluate for any development of Parsons's esophagus.  Risk benefits discussed with the patient we will schedule this at her convenience     "

## 2024-12-20 ENCOUNTER — OFFICE VISIT (OUTPATIENT)
Dept: FAMILY MEDICINE CLINIC | Facility: CLINIC | Age: 69
End: 2024-12-20
Payer: MEDICARE

## 2024-12-20 VITALS
DIASTOLIC BLOOD PRESSURE: 80 MMHG | RESPIRATION RATE: 14 BRPM | WEIGHT: 203 LBS | OXYGEN SATURATION: 97 % | BODY MASS INDEX: 35.97 KG/M2 | TEMPERATURE: 98.1 F | HEIGHT: 63 IN | SYSTOLIC BLOOD PRESSURE: 130 MMHG | HEART RATE: 84 BPM

## 2024-12-20 DIAGNOSIS — M54.50 ACUTE LEFT-SIDED LOW BACK PAIN WITHOUT SCIATICA: ICD-10-CM

## 2024-12-20 DIAGNOSIS — E53.8 BIOTIN DEFICIENCY: ICD-10-CM

## 2024-12-20 DIAGNOSIS — M51.362 DEGENERATION OF INTERVERTEBRAL DISC OF LUMBAR REGION WITH DISCOGENIC BACK PAIN AND LOWER EXTREMITY PAIN: ICD-10-CM

## 2024-12-20 DIAGNOSIS — M47.812 FACET ARTHROPATHY, CERVICAL: ICD-10-CM

## 2024-12-20 DIAGNOSIS — M54.40 CHRONIC BILATERAL LOW BACK PAIN WITH SCIATICA, SCIATICA LATERALITY UNSPECIFIED: ICD-10-CM

## 2024-12-20 DIAGNOSIS — M19.90 ARTHRITIS: ICD-10-CM

## 2024-12-20 DIAGNOSIS — E55.9 VITAMIN D DEFICIENCY: ICD-10-CM

## 2024-12-20 DIAGNOSIS — H93.19 TINNITUS, UNSPECIFIED LATERALITY: ICD-10-CM

## 2024-12-20 DIAGNOSIS — R73.03 PREDIABETES: ICD-10-CM

## 2024-12-20 DIAGNOSIS — R27.0 ATAXIA: ICD-10-CM

## 2024-12-20 DIAGNOSIS — M25.559 HIP PAIN, UNSPECIFIED LATERALITY: ICD-10-CM

## 2024-12-20 DIAGNOSIS — E53.8 B12 DEFICIENCY: ICD-10-CM

## 2024-12-20 DIAGNOSIS — D75.89 MACROCYTOSIS: ICD-10-CM

## 2024-12-20 DIAGNOSIS — N28.9 ABNORMAL RENAL FUNCTION: ICD-10-CM

## 2024-12-20 DIAGNOSIS — N20.0 KIDNEY STONES: ICD-10-CM

## 2024-12-20 DIAGNOSIS — I65.23 ATHEROSCLEROSIS OF BOTH CAROTID ARTERIES: ICD-10-CM

## 2024-12-20 DIAGNOSIS — G89.29 BILATERAL CHRONIC KNEE PAIN: ICD-10-CM

## 2024-12-20 DIAGNOSIS — M25.561 BILATERAL CHRONIC KNEE PAIN: ICD-10-CM

## 2024-12-20 DIAGNOSIS — G89.29 CHRONIC BILATERAL LOW BACK PAIN WITH SCIATICA, SCIATICA LATERALITY UNSPECIFIED: ICD-10-CM

## 2024-12-20 DIAGNOSIS — M25.562 BILATERAL CHRONIC KNEE PAIN: ICD-10-CM

## 2024-12-20 DIAGNOSIS — K21.00 GASTROESOPHAGEAL REFLUX DISEASE WITH ESOPHAGITIS WITHOUT HEMORRHAGE: ICD-10-CM

## 2024-12-20 DIAGNOSIS — E78.49 OTHER HYPERLIPIDEMIA: Primary | ICD-10-CM

## 2024-12-20 DIAGNOSIS — G45.0 VERTEBROBASILAR INSUFFICIENCY: ICD-10-CM

## 2024-12-20 DIAGNOSIS — M19.049 CMC ARTHRITIS: ICD-10-CM

## 2024-12-20 DIAGNOSIS — Z80.0 FAMILY HISTORY OF GI MALIGNANCY: ICD-10-CM

## 2024-12-20 DIAGNOSIS — F41.8 DEPRESSION WITH ANXIETY: ICD-10-CM

## 2024-12-20 DIAGNOSIS — K22.2 ESOPHAGEAL STRICTURE: ICD-10-CM

## 2024-12-20 DIAGNOSIS — R42 DIZZINESS: ICD-10-CM

## 2024-12-20 DIAGNOSIS — R26.9 ABNORMAL GAIT: ICD-10-CM

## 2024-12-20 DIAGNOSIS — M50.30 DEGENERATIVE DISC DISEASE, CERVICAL: ICD-10-CM

## 2024-12-20 DIAGNOSIS — K62.89 DECREASED ANAL SPHINCTER TONE: ICD-10-CM

## 2024-12-20 DIAGNOSIS — R79.9 ABNORMAL FINDING OF BLOOD CHEMISTRY, UNSPECIFIED: ICD-10-CM

## 2024-12-20 LAB
EXPIRATION DATE: ABNORMAL
HBA1C MFR BLD: 5.8 % (ref 4.5–5.7)
Lab: ABNORMAL

## 2024-12-20 RX ORDER — CYCLOBENZAPRINE HCL 10 MG
10 TABLET ORAL 3 TIMES DAILY PRN
Qty: 45 TABLET | Refills: 0 | Status: SHIPPED | OUTPATIENT
Start: 2024-12-20 | End: 2024-12-20 | Stop reason: SDUPTHER

## 2024-12-20 RX ORDER — MECLIZINE HYDROCHLORIDE 25 MG/1
TABLET ORAL
COMMUNITY
Start: 2024-12-01

## 2024-12-20 RX ORDER — CYCLOBENZAPRINE HCL 10 MG
10 TABLET ORAL 3 TIMES DAILY PRN
Qty: 45 TABLET | Refills: 0 | Status: SHIPPED | OUTPATIENT
Start: 2024-12-20

## 2024-12-20 RX ORDER — METHYLPREDNISOLONE 4 MG/1
TABLET ORAL
Qty: 21 TABLET | Refills: 0 | Status: SHIPPED | OUTPATIENT
Start: 2024-12-20

## 2024-12-20 NOTE — PROGRESS NOTES
medroSubjective   Virginia Leiva is a 69 y.o. female who presents today in follow-up of hyperlipidemia, vitamin D and B12 deficiencies, macrocytosis, GERD, moods, dizziness, back pain, arthritis, and history of fecal incontenence.    Hyperlipidemia    Anxiety          Hyperlipidemia-has done well with starting atorvastatin and is tolerating without AE.  10-year risk 6.5% 5/2024.  Prediabetes, elevated fasting glucose-A1c 5.6% 11/2023.  A1c increased to 5.8% 5/2024.  Vitamin D-taking 2000 IU once daily.  Advised to increase vitamin D to 2000 IU daily 3/2023.  Macrocytosis, B12-taking B12 500 mcg once weekly.  Now on folic acid instead of MTV.   Biotin deficiency-advised to take biotin 100 mcg daily or 1000 mcg once weekly 5/2024. Taking biotin 1000 mcg once weekly.   Abnormal renal function- was told by neurosurgery could take Naprosyn x 3 if pain. She does not take all the time but took a few days ago. Got better with heating pad.     Kidney stone- no issues now.   She previously went to ER for kidney stone, hydronephrosis, diverticulosis, abdominal pain,  diaphragmatic hernia. Not sure but thought could have passed. Follow up with urology.   Patient went to ER 4/222/2023 for flank pain, nausea, and vomiting. Called urology- start antibiotics and follow up outpatient. Given Norco, Zofran, Keflex given in the hospital and discharged on Keflex 500 mg 4 times daily for 5 days.  Patient asked that medication be changed to Toradol-MB declined in association of patient's age.  Urine sample contaminated with stool.  CT abd/pelvis-multiple tiny bilateral nonobstructing renal stones.  3 mm right UPJ stone with mild to moderate right-sided hydronephrosis.  Extensive sigmoid diverticulosis without diverticulitis.  Fat-containing left inguinal hernia.  Mild to moderate degenerative changes lumbar spine.  UA- protein, hematuria, leuk est, and 4+ bacteria.   Creatinine 1.21  Seen by Dr Fara DAVEY- ok with taking medication-  "Prilosec without breakthrough symptoms.   If misses a dose, she feels it immediately. Did have hamburger get stuck a [place]  She had an EGD 5/2018 without stricture or need for dilation (history of stricture in the past).  She was noted to have medium/moderate hiatal hernia and mucosa was suggestive of eosinophilic esophagitis with negative biopsies for eosinophils.  He recommended repeat EGD in 3 to 5 years.    Moods- increased to Prozac 20 mg twice daily- has been doing ok.   Prozac 20 mg once daily- tolerated without AE but felt she was snappy and crying easily. She was unsure it was working as well. Not sure if it was related to dizziness or her daughter's stressors or 's issues. She was still working from home 2 days a week and at work 3 days.     Dizziness/tinnitus-Dizziness not completely gone but is better than it was.   Staying tired but hard to do things when she has dizziness.  She was seen by [facility] and started therapy through them.  Is finally helping.  She still has some dizziness but is finally improving.  Neurology was unable to find etiology of dizziness.  She continues with dizziness. Left top of head and parietal area. She wakes up with a headache- mostly on the left but some on the right as well. She notices that when she leans her head back and keeps it steady, she has improvement in dizziness. No neck pain, numbness or tingling in arms. Feels like \"when you sleep too much and wake up\"- feels that way all the time. At night, not moving much. Does not wake up but when she gets up, she feels weird. Affects the way she feels about things.   Dizziness started 2/2020. When she gets up, she feels like her eyes are twitching and very occasionally feels some spinning. Symptoms occur seldom when she was driving down the road, and she got nervous to focus and see. No consistent dizziness with head turning and no dizziness with laying down. She has had ringing in her ears for years with " normal hearing and no ear pain. If she lays on 1 side too long, she has a headache- worse on the left. She denies confusion, trouble speaking, trouble using arms/ legs, and blurred vision. She has had numbness in hands intermittent but no other numbness or neurological symptoms.   Last opt/ophthalmology was > 1 year ago. She was advised to go for annual eye exam.  I referred for MRI brain and IAC with opacified hypoplastic right sphenoid sinus. She was treated for sinusitis and advised we can send to ENT.   Carotid duplex with bilateral plaque without stenosis.    ENT- Dr Lozano- he did ENG and testing. Told slight hearing loss but no other etiology of symtpoms.   Neurosurgery-     Unsteady gait, neck pain, DDD/OA/facet arthropathy cervical spine, dizziness- no numbness, tingling or weakness in arm or hand.  MRI lumbar spine with mild scoliosis, arthritis, and degenerative changes.  Inferior tip of the conus medullaris posterior midline thecal sac at L2-3-borderline but thought to be lower limits of normal.  Very mild dextroscoliotic curvature of the lumbar spine with apex at L3-4, L2-3 minimal posterior spurring with left paracentral disc protrusion or tiny disc herniation with minimal indentation of the left ventral aspect of the thecal sac and minimally narrows the left sided canal, minimal left foraminal narrowing L2-3.  L4-5-tiny posterior annular fissure with posterior central disc bulge and minimal facet overgrowth and minimal if any canal narrowing.  Mild disc space narrowing degenerative endplate changes L5-S1 with mild bilateral facet overgrowth and mild posterior spurring.  Referred to neurosurgery  Seen by Dr. Archuleta-referred to physical therapy.  Follow-up as needed  Back pain-every once in a while has something but if takes a muscle relaxer x 1, is ok.   No worsening, changing, new or different symptoms. She has not had any increased symptoms.  Patient uses Flexeril only occasionally with back pain.  She was told it was arthritis at some point.   History of Fecal incontinence- no recurrence. Doing ok.   She noted change in BM 8/2018 with increased fecal urgency and incontinence.  She also had stress fecal incontinence.  She had significant hemorrhoids on exam and possible mild rectal prolapse as well with decreased rectal tone.  Patient wanted to start with follow-up with Dr. Covarrubias but did not schedule follow-up with him.  She reports symptoms improved.    I discussed the need for follow-up with GI, MRI lumbosacral region, and consideration of further neurological work-up as well as consideration of pelvic floor strengthening PT.  She did not want to proceed with further work-up at that time and has had no recurrence of the symptoms    Arthritis-She uses Tylenol as needed.  She had right MCP surgery 10/2018 and left MCP surgery 12/2018 and healed well.    She also has chronic left knee pain, intermittent feels like it going to give out. She has been seen by orthopedic surgery with arthritis of her knees.    Skin discoloration, axillary abnormality- has not changed but continues. Not bothersome. Fading but not gone. She does not want to see dermatology.   Has discoloration of skin left axilla- now spreading- was read and now not as red. No pain or itching. It was really read and now faded but has spread in size.     Patient's Specialists:  Cardiology- Dr South- last appt 9/2018 for preop cleareance. EKG thought to be negative. Hyperlipidemia- advised low dose statin and risk reduction. Follow up PRN.   GI-Dr Covarrubias- last EGD 5/2018 medium hiatal hernia. Repeat in 3-5 years.   Hand surgery- Dr Tejada- last appt 1/2021 in follow up of radial digital nerve laceration left index finger with repair with vein graft 12/2020. Follow up in 3 months.   Prior 1/2019 for left thumb carpometacarpal arthoplasty with LRTI and left de Quervain's release.   Neurology- Dr Carroll- last appt 8/2021 for dizziness, vertebrobasilar  insufficiency, intracranial cerebrovascular stenosis.  Increase aspirin to 325 mg daily.  Referred to neurosurgery for possible arteriogram to determine outpouching as infundibulum versus aneurysm.  Advised him sleep study.  Follow-up after sleep study.   Neurosurgery-Dr. Quiñones- no vertebrobasilar stenosis or vertebral artery narrowing bilaterally.  No vascular explanation for symptoms.  Follow-up as needed.  Orthopedic surgery- Dr Lee Roberto- last appt 8/2017 for patellofemoral arthritis left knee. Received injection. Advied try tumeric or glucosamine. Follow up PRN.   ENT-Dr. Severtson-last appt 3/2022 for generalized disequilibrium, dizziness, tinnitus, and allergic rhinitis.  Advised increased activity level, RADHA through [facility], avoid caffeine, nicotine, chocolate, and salt.  CBT versus TCA for tinnitus.  Consider hearing aid evaluation.  Prior Dr Lozano- . Treated a second time for blocked sphenoid sinus.   Podiatry- Dr Julian- last appt 7/2020 for 5th metatarsal fracture. Advised MELONIE boot.     The following portions of the patient's history were reviewed and updated as appropriate: allergies, current medications, past family history, past medical history, past social history, past surgical history and problem list.    Review of Systems    Objective    Vitals:    12/20/24 1150   BP: 130/80   Pulse: 84   Resp: 14   Temp: 98.1 °F (36.7 °C)   SpO2: 97%     Body mass index is 35.97 kg/m².     Physical Exam   Constitutional: She is oriented to person, place, and time. She appears well-developed.   HENT:   Head: Normocephalic and atraumatic.   Right Ear: External ear normal.   Left Ear: External ear normal.   Nose: Nose normal.   Eyes: Conjunctivae and lids are normal.   Neck: Carotid bruit is not present.   Cardiovascular: Normal rate, regular rhythm and normal heart sounds. Exam reveals no gallop and no friction rub.   No murmur heard.  Pulmonary/Chest: Effort normal and breath sounds normal. No  respiratory distress. She has no wheezes. She has no rhonchi. She has no rales.   Musculoskeletal: No deformity.   Neurological: She is alert and oriented to person, place, and time. Gait normal.   Skin: Skin is warm and dry.   Psychiatric: Her speech is normal and behavior is normal. Thought content normal.   Nursing note and vitals reviewed.      Assessment & Plan   Diagnoses and all orders for this visit:    1. Other hyperlipidemia (Primary)  -     Comprehensive Metabolic Panel  -     CK  -     Lipid Panel With LDL / HDL Ratio    2. Prediabetes  -     Comprehensive Metabolic Panel  -     TSH  -     T4, free  -     T3, Free  -     Urinalysis With Culture If Indicated -  -     POC Glycated Hemoglobin, Total    3. Vitamin D deficiency  -     Comprehensive Metabolic Panel  -     Vitamin D,25-Hydroxy    4. Macrocytosis  -     CBC & Differential  -     Iron and TIBC  -     Ferritin  -     Vitamin B12 & Folate  -     Vitamin B7 (Biotin)    5. B12 deficiency  -     CBC & Differential  -     Vitamin B12 & Folate    6. Biotin deficiency  -     CBC & Differential  -     Vitamin B7 (Biotin)    7. Abnormal renal function  -     Comprehensive Metabolic Panel    8. Kidney stones  -     Urinalysis With Culture If Indicated -    9. Gastroesophageal reflux disease with esophagitis without hemorrhage    10. Esophageal stricture    11. Family history of GI malignancy    12. Depression with anxiety    13. Dizziness    14. Ataxia    15. Abnormal gait    16. Vertebrobasilar insufficiency    17. Atherosclerosis of both carotid arteries    18. Tinnitus, unspecified laterality    19. Degenerative disc disease, cervical  -     BEN With / DsDNA, RNP, Sjogrens A / B, Smith  -     C-reactive Protein  -     Cyclic Citrul Peptide Antibody, IgG / IgA  -     Rheumatoid Factor  -     Sedimentation Rate  -     Uric Acid    20. Facet arthropathy, cervical  -     BEN With / DsDNA, RNP, Sjogrens A / B, Smith  -     C-reactive Protein  -     Cyclic  Citrul Peptide Antibody, IgG / IgA  -     Rheumatoid Factor  -     Sedimentation Rate  -     Uric Acid    21. Chronic bilateral low back pain with sciatica, sciatica laterality unspecified  -     BEN With / DsDNA, RNP, Sjogrens A / B, Smith  -     C-reactive Protein  -     Cyclic Citrul Peptide Antibody, IgG / IgA  -     Rheumatoid Factor  -     Sedimentation Rate  -     Uric Acid    22. Degeneration of intervertebral disc of lumbar region with discogenic back pain and lower extremity pain  -     BEN With / DsDNA, RNP, Sjogrens A / B, Smith  -     C-reactive Protein  -     Cyclic Citrul Peptide Antibody, IgG / IgA  -     Rheumatoid Factor  -     Sedimentation Rate  -     Uric Acid    23. Decreased anal sphincter tone  -     BEN With / DsDNA, RNP, Sjogrens A / B, Smith  -     C-reactive Protein  -     Cyclic Citrul Peptide Antibody, IgG / IgA  -     Rheumatoid Factor  -     Sedimentation Rate  -     Uric Acid    24. Arthritis  -     BEN With / DsDNA, RNP, Sjogrens A / B, Smith  -     C-reactive Protein  -     Cyclic Citrul Peptide Antibody, IgG / IgA  -     Rheumatoid Factor  -     Sedimentation Rate  -     Uric Acid    25. 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 Hip pain, unspecified laterality  -     BEN With / DsDNA, RNP, Sjogrens A / B, Smith  -     C-reactive Protein  -     Cyclic Citrul Peptide Antibody, IgG / IgA  -     Rheumatoid Factor  -     Sedimentation Rate  -     Uric Acid  -     methylPREDNISolone (Medrol) 4 MG dose pack; follow package directions (Patient not taking: Reported on 1/3/2025)  Dispense: 21  tablet; Refill: 0    28. Acute left-sided low back pain without sciatica  -     Discontinue: cyclobenzaprine (FLEXERIL) 10 MG tablet; Take 1 tablet by mouth 3 (Three) Times a Day As Needed for Muscle Spasms.  Dispense: 45 tablet; Refill: 0  -     methylPREDNISolone (Medrol) 4 MG dose pack; follow package directions (Patient not taking: Reported on 1/3/2025)  Dispense: 21 tablet; Refill: 0  -     cyclobenzaprine (FLEXERIL) 10 MG tablet; Take 1 tablet by mouth 3 (Three) Times a Day As Needed for Muscle Spasms.  Dispense: 45 tablet; Refill: 0    29. Abnormal finding of blood chemistry, unspecified  -     Iron and TIBC  -     Ferritin           Assessment and plan  Patient will have fasting labs. Call if no results in 1 week. Stability of conditions, plan, follow up, and further recommendations pending labs.  If stable, follow-up in 6 months.    Hyperlipidemia-Last lipids were up about 30 points. Continue atorvastatin 10 mg at bedtime.  Further treatment recommendations pending labs.     Vitamin D deficiency-Continue vitamin D 2000 IU daily. Dosing recommendations following labs.   B12 deficiency-Continue B12 500 mcg daily. Await labs for further recommendations.    Biotin deficiency- Continue Biotin 1000 mcg weekly. Further recommendations for dosing pending results.   Abnormal renal function- Patient should avoid NSAIDs and ensure proper hydration.  Unfortunately, she is taking naproxen as needed for pain. I will recheck labs and make further recommendations.  GERD, history of esophageal stricture-Controlled symptoms as long as she does not miss medication but has symptoms with any missed doses.  She did have 1 episode of hamburger getting stuck in her throat. Continue Prilosec 20 mg once daily. Patient needs to see GI in follow-up for consideration of repeat EGD and to see if they recommend a colonoscopy for screening rather than Cologuard.  Depression with anxiety- Moods were uncontrolled with increased stressors  and irritability. She has had improvement with Prozac 20 mg twice daily.  Continue current dose.  To be seen ASAP if worsening moods or AE with medication.   Dizziness/ Tinnitus- I referred for MRI brain and IAC as well as carotid duplex with some sinus disease but otherwise negative. CTA head and neck to ensure no cerbrovascular etiology of symptoms and advised BPPV physical therapy/ exercises. She was seen by ENT who thought there could be a vascular etiology. They ordered VNG- no etiology of symptoms. She was seen by neurology who also thought some cerebrovascular disease and advised Aspirin 325 mg daily and sleep study.  She had possible abnormal CTA head and neck and was referred to neurosurgery.  She was seen by neurosurgery and underwent angiogram without etiology. MRI cervical spine with mild to moderate OA and DDD with spinal stenosis and possible left nerve root involvement.  She was seen again by neurosurgery without recommendations for intervention.  I repeat carotid duplex and checked additional labs today.  If no etiology, I referred back to neurology for evaluation. She is now doing PT with [facility]. Consideration of tertiary center if no etiology, as her dizziness and gait abnormality limit her life.    Cervical spine DDD, facet arthropathy, OA- MRI 2/2022 with mild to moderate OA and degenerative changes, minimal spinal stenosis at C5-6, and possible left nerve root involvement of C6 left nerve root.  She was seen by neurosurgery with no intervention recommended.  To be seen if worsening, new or changing symptoms.  Back pain, scoliosis, DJD and DDD lumbar spine, spinal stenosis- She was seen by neurosurgery with no intervention recommended. She is having worsening pain, and I will check autoimmune testing and give Medrol dose pack as directed and Flexeril as needed. Consideration of PT, additional imaging, and specialist follow up if persistent worsening. To be seen ASAP if worsening, new or  changing symptoms.  Arthritis- Patient with hip pain. I will give Medrol dose pack and Flexeril as needed. Continue Tylenol as needed.  I have counseled her on my recommendations to avoid naproxen if possible.  To see hand surgery or orthopedic surgery if worsening.  Fecal incontinence- If recurrence of symptoms or other symptoms, consider follow up with GI, the need for MRI lumbosacral region, consideration of further neurological work-up, as well as consideration of pelvic floor strengthening PT.  She should let me know if she is willing to proceed with further work-up.    From [facility] 5/2024. She due for mammogram 6/2024 and DEXA 6/2025. She declines colonoscopy- She is up to date on Cologuard until 2026. She should contact pharmacy about updating Pneumovax or Prevnar 20 and RSV vaccination.    I spent 30 minutes caring for Virginia Leiva on this date of service. This time includes time spent by me in the following activities: preparing for the visit, reviewing tests, specialists records, and previous visits, obtaining and/or reviewing a separately obtained history, performing a medically appropriate examination and/or evaluation, counseling and educating the patient/family/caregiver, referring and/or communicating with other health care professionals as necessary, documenting information in the medical record, independently interpreting results and communicating that information with the patient/family/caregiver, and developing a medically appropriate treatment plan with consideration of other conditions, medications, and treatments.

## 2025-01-03 ENCOUNTER — OFFICE VISIT (OUTPATIENT)
Dept: ORTHOPEDIC SURGERY | Facility: CLINIC | Age: 70
End: 2025-01-03
Payer: MEDICARE

## 2025-01-03 VITALS — WEIGHT: 200 LBS | BODY MASS INDEX: 35.44 KG/M2 | HEIGHT: 63 IN | TEMPERATURE: 98 F

## 2025-01-03 DIAGNOSIS — M17.0 PRIMARY OSTEOARTHRITIS OF BOTH KNEES: Primary | ICD-10-CM

## 2025-01-03 DIAGNOSIS — R52 PAIN: ICD-10-CM

## 2025-01-03 DIAGNOSIS — K21.9 GASTROESOPHAGEAL REFLUX DISEASE: ICD-10-CM

## 2025-01-03 DIAGNOSIS — K22.2 ESOPHAGEAL STRICTURE: ICD-10-CM

## 2025-01-05 PROBLEM — E53.8 BIOTIN DEFICIENCY: Status: ACTIVE | Noted: 2025-01-05

## 2025-01-05 NOTE — PROGRESS NOTES
"Patient ID: Virginia Leiva     Chief Complaint:    Chief Complaint   Patient presents with    Left Knee - Establish Care, Pain, Initial Evaluation    Right Knee - Establish Care, Pain, Initial Evaluation        HPI:    Virginia Leiva is a 69 y.o. who presents today for evaluation of bilateral knee pain.  Patient states the right is worse than the left.  She has more anteriorly generalized discomfort in the knee.  Symptoms are worse at the end of the day.  For some symptom management she will take Naprosyn which does help.  Denies any recent injury.        Social History     Socioeconomic History    Marital status:      Spouse name: CHAVEZ    Number of children: 2   Tobacco Use    Smoking status: Never    Smokeless tobacco: Never   Vaping Use    Vaping status: Never Used   Substance and Sexual Activity    Alcohol use: Not Currently    Drug use: No    Sexual activity: Defer     Past Medical History:   Diagnosis Date    Anxiety     Arthritis     GERD (gastroesophageal reflux disease)     Glaucoma     History of kidney infection     Hyperlipidemia     Kidney stones     Migraine      Family History   Problem Relation Age of Onset    Cancer Mother         stomach    Arthritis Mother     Stomach cancer Mother     Cancer Father         pancreatic    Aortic aneurysm Father     Cancer Paternal Grandmother         OVARIAN    Cancer Maternal Grandfather         LUNG    Malig Hyperthermia Neg Hx     Breast cancer Neg Hx        ROS:    ROS:  Constitutional:  Denies fever, shaking or chills         All other pertinent positives and negatives as noted above in HPI.    Physical Exam:     Vital Signs:  Temp 98 °F (36.7 °C) (Temporal)   Ht 160 cm (63\")   Wt 90.7 kg (200 lb)   BMI 35.43 kg/m²   Constitutional: Awake alert and oriented x3, well developed, well nourished, no acute distress, non-toxic appearance.      Musculoskeletal:    Exam of the bilateral  knee:  Painful gait with a subtle limp  No muscle atrophy, erythema, " ecchymosis, or gross deformity noted  no knee effusion    Active range of motion 0-120  5/5 strength flexion and extension  The knee is stable to varus and valgus stress testing  Mild varus alignment of the limb  Lachman negative  Posterior drawer negative  Danial's negative  Patellofemoral grind +  Sensation grossly intact to light tough throughout the lower extremity  Skin is intact  Distal pulses are 2+  No signs or symptoms of DVT          Diagnostic Studies:     Imaging was personally and individually reviewed and discussed at length with the patient:    4V bilateral knee(s) were taken in the office today, including AP, flexion PA, lateral, and sunrise views to evaluate the patient's complaint:  Weight bearing views show moderate degenerative changes in all three compartments with the medial compartment being most affected on the AP however the PA flexion lateral compartment shows more involvement which is severe bone-on-bone on the right knee..  There is early osteophyte formation throughout all three compartments.  Evidence of enthesophyte formation superior pole patella.  There is no evidence of fracture or dislocation.  No periosteal reactions or medullary lesions are seen.  Patellar height and alignment are within normal limits.     Comparison films not available    AP pelvis was taken in the office today: Minimal to mild changes bilateral hip joints [place] bone sclerosis.  May be just rotation of x-ray but slight decrease in appearance of right ilium versus left.            Assessment:     bilateral  Knee Osteoarthritis            Plan:     Based on x-rays, history, and office evaluation, we have diagnosed Virginia Leiva with knee arthritis. At this time, we recommend starting with a conservative treatment program. This will consist of cortisone injection during significant flare-ups, NSAIDS for daily maintenance, physical therapy for strengthening and modalities as indicated, and bracing when  appropriate. These measures will continue, until symptoms are no longer relieved, become more severe or function begins to significantly deteriorate. At that point joint replacement options will be discussed.    Discussed management as noted above we will continue conservative treatment.    Follow up in 6 months unless symptoms return or a new issue occurs.  Patient will call the office to schedule an appointment.     All questions were answered, the patient understands and agrees with the plan.

## 2025-01-13 ENCOUNTER — TELEPHONE (OUTPATIENT)
Dept: GASTROENTEROLOGY | Facility: CLINIC | Age: 70
End: 2025-01-13
Payer: MEDICARE

## 2025-01-13 NOTE — TELEPHONE ENCOUNTER
SENT A MY CHART MSG TO PT TRYING TO GET A HOLD OF HER AS WELL OK FOR THE HUB TO RELAY ON THAT AS WELL

## 2025-01-13 NOTE — TELEPHONE ENCOUNTER
Hub staff attempted to follow warm transfer process and was unsuccessful     Caller: Virginia Leiva    Relationship to patient: Self    Best call back number: [phone]    Patient is needing: RESCHEDULE PROCEDURE

## 2025-01-13 NOTE — TELEPHONE ENCOUNTER
Hub staff attempted to follow warm transfer process and was unsuccessful     Caller: Virginia Leiva    Relationship to patient: Self    Best call back number: [phone]    Patient is needing: PT IS RETURNING PHONE CALL FROM [facility]. PLEASE HAVE HIM CALL PT BACK

## 2025-01-13 NOTE — TELEPHONE ENCOUNTER
WAS ON LUNCH WHEN PT CALLED BACK IF SHE CALLS BACK IN IF SOMEONE AT THE  AT PUT HER ON HOLD I WOULD APPRECIATE IT     OK FOR THE HUB TO RELAY

## 2025-01-13 NOTE — TELEPHONE ENCOUNTER
Hub staff attempted to follow warm transfer process and was unsuccessful     Caller: Virginia Leiva    Relationship to patient: Self    Best call back number: [phone]    Patient is needing: PT IS RETURNING PHONE CALL FROM [facility]. PLEASE HAVE HIM CALL PT BACK

## 2025-01-25 LAB
25(OH)D3+25(OH)D2 SERPL-MCNC: 37.8 NG/ML (ref 30–100)
ALBUMIN SERPL-MCNC: 4.3 G/DL (ref 3.9–4.9)
ALP SERPL-CCNC: 110 IU/L (ref 44–121)
ALT SERPL-CCNC: 19 IU/L (ref 0–32)
ANA SER QL: NEGATIVE
AST SERPL-CCNC: 19 IU/L (ref 0–40)
BASOPHILS # BLD AUTO: 0.1 X10E3/UL (ref 0–0.2)
BASOPHILS NFR BLD AUTO: 1 %
BILIRUB SERPL-MCNC: 0.7 MG/DL (ref 0–1.2)
BIOTIN SERPL-MCNC: 1.48 NG/ML (ref 0.05–0.83)
BUN SERPL-MCNC: 17 MG/DL (ref 8–27)
BUN/CREAT SERPL: 17 (ref 12–28)
CALCIUM SERPL-MCNC: 9.6 MG/DL (ref 8.7–10.3)
CCP IGA+IGG SERPL IA-ACNC: 2 UNITS (ref 0–19)
CHLORIDE SERPL-SCNC: 105 MMOL/L (ref 96–106)
CHOLEST SERPL-MCNC: 183 MG/DL (ref 100–199)
CK SERPL-CCNC: 85 U/L (ref 32–182)
CO2 SERPL-SCNC: 23 MMOL/L (ref 20–29)
CREAT SERPL-MCNC: 1.03 MG/DL (ref 0.57–1)
CRP SERPL-MCNC: 3 MG/L (ref 0–10)
EGFRCR SERPLBLD CKD-EPI 2021: 59 ML/MIN/1.73
EOSINOPHIL # BLD AUTO: 0.2 X10E3/UL (ref 0–0.4)
EOSINOPHIL NFR BLD AUTO: 3 %
ERYTHROCYTE [DISTWIDTH] IN BLOOD BY AUTOMATED COUNT: 12.6 % (ref 11.7–15.4)
ERYTHROCYTE [SEDIMENTATION RATE] IN BLOOD BY WESTERGREN METHOD: 14 MM/HR (ref 0–40)
FERRITIN SERPL-MCNC: 67 NG/ML (ref 15–150)
FOLATE SERPL-MCNC: >20 NG/ML
GLOBULIN SER CALC-MCNC: 2.5 G/DL (ref 1.5–4.5)
GLUCOSE SERPL-MCNC: 101 MG/DL (ref 70–99)
HCT VFR BLD AUTO: 43.4 % (ref 34–46.6)
HDLC SERPL-MCNC: 57 MG/DL
HGB BLD-MCNC: 14.4 G/DL (ref 11.1–15.9)
IMM GRANULOCYTES # BLD AUTO: 0 X10E3/UL (ref 0–0.1)
IMM GRANULOCYTES NFR BLD AUTO: 0 %
IRON SATN MFR SERPL: 28 % (ref 15–55)
IRON SERPL-MCNC: 89 UG/DL (ref 27–139)
LDLC SERPL CALC-MCNC: 108 MG/DL (ref 0–99)
LDLC/HDLC SERPL: 1.9 RATIO (ref 0–3.2)
LYMPHOCYTES # BLD AUTO: 1.9 X10E3/UL (ref 0.7–3.1)
LYMPHOCYTES NFR BLD AUTO: 30 %
MCH RBC QN AUTO: 30.6 PG (ref 26.6–33)
MCHC RBC AUTO-ENTMCNC: 33.2 G/DL (ref 31.5–35.7)
MCV RBC AUTO: 92 FL (ref 79–97)
MONOCYTES # BLD AUTO: 0.4 X10E3/UL (ref 0.1–0.9)
MONOCYTES NFR BLD AUTO: 6 %
NEUTROPHILS # BLD AUTO: 3.8 X10E3/UL (ref 1.4–7)
NEUTROPHILS NFR BLD AUTO: 60 %
PLATELET # BLD AUTO: 192 X10E3/UL (ref 150–450)
POTASSIUM SERPL-SCNC: 4.6 MMOL/L (ref 3.5–5.2)
PROT SERPL-MCNC: 6.8 G/DL (ref 6–8.5)
RBC # BLD AUTO: 4.71 X10E6/UL (ref 3.77–5.28)
RHEUMATOID FACT SERPL-ACNC: 10.9 IU/ML
SODIUM SERPL-SCNC: 142 MMOL/L (ref 134–144)
T3FREE SERPL-MCNC: 3 PG/ML (ref 2–4.4)
T4 FREE SERPL-MCNC: 1.09 NG/DL (ref 0.82–1.77)
TIBC SERPL-MCNC: 319 UG/DL (ref 250–450)
TRIGL SERPL-MCNC: 98 MG/DL (ref 0–149)
TSH SERPL DL<=0.005 MIU/L-ACNC: 0.96 UIU/ML (ref 0.45–4.5)
UIBC SERPL-MCNC: 230 UG/DL (ref 118–369)
URATE SERPL-MCNC: 4.9 MG/DL (ref 3–7.2)
VIT B12 SERPL-MCNC: 642 PG/ML (ref 232–1245)
VLDLC SERPL CALC-MCNC: 18 MG/DL (ref 5–40)
WBC # BLD AUTO: 6.3 X10E3/UL (ref 3.4–10.8)

## 2025-01-27 DIAGNOSIS — E78.49 OTHER HYPERLIPIDEMIA: ICD-10-CM

## 2025-01-27 RX ORDER — ATORVASTATIN CALCIUM 20 MG/1
20 TABLET, FILM COATED ORAL NIGHTLY
Qty: 90 TABLET | Refills: 0 | Status: SHIPPED | OUTPATIENT
Start: 2025-01-27 | End: 2025-01-28 | Stop reason: SDUPTHER

## 2025-01-28 ENCOUNTER — PATIENT MESSAGE (OUTPATIENT)
Dept: FAMILY MEDICINE CLINIC | Facility: CLINIC | Age: 70
End: 2025-01-28
Payer: MEDICARE

## 2025-01-28 DIAGNOSIS — E78.49 OTHER HYPERLIPIDEMIA: ICD-10-CM

## 2025-01-28 RX ORDER — ATORVASTATIN CALCIUM 20 MG/1
20 TABLET, FILM COATED ORAL NIGHTLY
Qty: 90 TABLET | Refills: 0 | Status: SHIPPED | OUTPATIENT
Start: 2025-01-28

## 2025-02-07 ENCOUNTER — HOSPITAL ENCOUNTER (OUTPATIENT)
Dept: MRI IMAGING | Facility: HOSPITAL | Age: 70
Discharge: HOME | End: 2025-02-07
Payer: MEDICARE

## 2025-02-07 DIAGNOSIS — R92.30 INCONCLUSIVE MAMMOGRAM DUE TO DENSE BREASTS: ICD-10-CM

## 2025-02-07 DIAGNOSIS — R92.2 INCONCLUSIVE MAMMOGRAM DUE TO DENSE BREASTS: ICD-10-CM

## 2025-02-07 PROCEDURE — A9577 INJ MULTIHANCE: HCPCS | Performed by: PHYSICIAN ASSISTANT

## 2025-02-07 PROCEDURE — C8937 CAD BREAST MRI: HCPCS

## 2025-02-07 PROCEDURE — C8908 MRI W/O FOL W/CONT, BREAST,: HCPCS

## 2025-02-07 PROCEDURE — 25510000002 GADOBENATE DIMEGLUMINE 529 MG/ML SOLUTION: Performed by: PHYSICIAN ASSISTANT

## 2025-02-07 RX ADMIN — GADOBENATE DIMEGLUMINE 19 ML: 529 INJECTION, SOLUTION INTRAVENOUS at 18:01

## 2025-02-11 ENCOUNTER — PATIENT MESSAGE (OUTPATIENT)
Dept: FAMILY MEDICINE CLINIC | Facility: CLINIC | Age: 70
End: 2025-02-11
Payer: MEDICARE

## 2025-02-11 ENCOUNTER — TELEPHONE (OUTPATIENT)
Dept: FAMILY MEDICINE CLINIC | Facility: CLINIC | Age: 70
End: 2025-02-11
Payer: MEDICARE

## 2025-02-11 NOTE — TELEPHONE ENCOUNTER
----- Message from Kasandra Bui sent at 2/9/2025 12:58 PM EST -----  Regarding: bx recs  Good afternoon,  Suspicious 2 cm linear non-mass enhancement at 5:00 in the left breast. Recommend further evaluation with MRI guided core needle biopsy.  Thanks

## 2025-02-14 DIAGNOSIS — R92.8 ABNORMAL MRI, BREAST: Primary | ICD-10-CM

## 2025-02-17 NOTE — TELEPHONE ENCOUNTER
I tried to call and get records. I had to leave a VM asking them to give our office a call back due to us needing medical records.

## 2025-02-18 ENCOUNTER — TELEPHONE (OUTPATIENT)
Dept: SURGERY | Facility: CLINIC | Age: 70
End: 2025-02-18
Payer: MEDICARE

## 2025-02-19 DIAGNOSIS — R92.8 ABNORMAL MRI, BREAST: Primary | ICD-10-CM

## 2025-02-23 DIAGNOSIS — K21.9 GASTROESOPHAGEAL REFLUX DISEASE: ICD-10-CM

## 2025-02-23 DIAGNOSIS — K22.2 ESOPHAGEAL STRICTURE: ICD-10-CM

## 2025-02-25 NOTE — PROGRESS NOTES
02/27/25 0001   Pre-Procedure Phone Call   Procedure Time Verified Yes   Arrival Time 0845   Procedure Location Verified Yes   Medical History Reviewed No   NPO Status Reinforced No   Ride and Caregiver Arranged N/A  (will probably  herself)     Spoke to Virginia. Informed her to arrive one hour prior to biopsy, can eat and drink and drive self to and from, advised re type of clothing and avoidance of OTC blood thinners for 5-7 days prior to biopsy. Left our contact number should she need to reach us  1) Virginia is aware we cannot provide Valium  2) Virginia will Hold her Aspirin 325 mg, beginning today.

## 2025-02-27 ENCOUNTER — HOSPITAL ENCOUNTER (OUTPATIENT)
Dept: MAMMOGRAPHY | Facility: HOSPITAL | Age: 70
Discharge: HOME | End: 2025-02-27
Payer: MEDICARE

## 2025-02-27 ENCOUNTER — HOSPITAL ENCOUNTER (OUTPATIENT)
Dept: MRI IMAGING | Facility: HOSPITAL | Age: 70
Discharge: HOME | End: 2025-02-27
Payer: MEDICARE

## 2025-02-27 VITALS
TEMPERATURE: 98.2 F | OXYGEN SATURATION: 96 % | HEART RATE: 71 BPM | DIASTOLIC BLOOD PRESSURE: 82 MMHG | RESPIRATION RATE: 16 BRPM | BODY MASS INDEX: 35.44 KG/M2 | SYSTOLIC BLOOD PRESSURE: 149 MMHG | WEIGHT: 200 LBS | HEIGHT: 63 IN

## 2025-02-27 DIAGNOSIS — R92.8 ABNORMAL MRI, BREAST: ICD-10-CM

## 2025-02-27 LAB — CREAT BLDA-MCNC: 1 MG/DL (ref 0.6–1.3)

## 2025-02-27 PROCEDURE — C8905 MRI W/O FOL W/CONT, BRST, UN: HCPCS

## 2025-02-27 PROCEDURE — A9577 INJ MULTIHANCE: HCPCS | Performed by: STUDENT IN AN ORGANIZED HEALTH CARE EDUCATION/TRAINING PROGRAM

## 2025-02-27 PROCEDURE — 25510000002 GADOBENATE DIMEGLUMINE 529 MG/ML SOLUTION: Performed by: STUDENT IN AN ORGANIZED HEALTH CARE EDUCATION/TRAINING PROGRAM

## 2025-02-27 PROCEDURE — C8937 CAD BREAST MRI: HCPCS

## 2025-02-27 PROCEDURE — 19085 BX BREAST 1ST LESION MR IMAG: CPT

## 2025-02-27 PROCEDURE — 82565 ASSAY OF CREATININE: CPT

## 2025-02-27 RX ADMIN — GADOBENATE DIMEGLUMINE 19 ML: 529 INJECTION, SOLUTION INTRAVENOUS at 10:38

## 2025-03-07 NOTE — PATIENT INSTRUCTIONS
63 YEAR OLD FEMALE WHO PRESENTS TODAY IN FOLLOW UP OF HYPERLIPIDEMIA, VITAMIN D,  MACROCYTOSIS, B12 DEFICIENCY, AND MOODS. PATIENT TO HAVE FASTING LABS TODAY- CALL IF NO RESULTS IN 1 WEEK. SHE REPORTS MOODS ARE OK AND GERD HAS BEEN OK. SHE HAD RECENT EGD IN 5/2018 WITHOUT STRICTURE OR NEED FOR DILATION (HISTORY OF STRICTURE IN THE PAST). SHE WAS NOTED TO HAVE MEDIUM, MODERATE HIATAL HERNIA AND MUCOSA WAS SUGGESTIVE OF EOSINOPHILIC ESOPHAGITIS WITH NEGATIVE BIOPSY FOR EOSINOPHILS. HE SUGGESTED REPEAT EGD IN 3-5 YEARS.     SHE HAD RIGHT MCP SURGERY 10/2018 AND LEFT MCP SURGERY 12/17/18. SHE IS RECOVERING WELL AND WORKING ON STRENGTHENING RIGHT HAND. LEFT CAST WILL BE REMOVED THIS WEEK. KEEP FOLLOW UP WITH HAND SURGEON AS DIRECTED.      OF NOTE, PATIENT NOTED CHANGE IN BM IN 8/2018 WITH INCREASED URGENCY AND FECAL INCONTINENCE. ALSO HAD STRESS FECAL INCONTINENCE. SHE HAD SIGNIFICANT HEMORRHOIDS ON EXAM AND POSSIBLE MILD RECTAL PROLAPSE AS WELL. SHE HAD NOTED DECREASED RECTAL TONE. PATIENT WANTED TO START WITH FOLLOW UP WITH DR UNDERWOOD. SHE HAS NOT YET FOLLOWED UP, HOWEVER, SHE REPORTS SYMPTOMS HAVE IMPROVED SOME. IF NO FINDINGS WITH HIM, SHE WILL NEED MRI LUMBOSACRAL REGION AND CONSIDER FURTHER NEUROLOGICAL WORKUP. WE COULD ALSO CONSIDER PELVIC FLOOR STRENGTHENING PT. PATIENT WILL CALL AFTER SEEING DR UNDERWOOD, AND I WILL AWAIT NOTE. PATIENT TO LET ME KNOW IF SHE IS UNABLE TO GET APPT SCHEDULED.    Calm

## 2025-03-18 ENCOUNTER — TELEPHONE (OUTPATIENT)
Dept: FAMILY MEDICINE CLINIC | Facility: CLINIC | Age: 70
End: 2025-03-18
Payer: MEDICARE

## 2025-03-18 ENCOUNTER — TELEPHONE (OUTPATIENT)
Dept: SURGERY | Facility: CLINIC | Age: 70
End: 2025-03-18
Payer: MEDICARE

## 2025-03-18 NOTE — TELEPHONE ENCOUNTER
Call from dr jr chinchilla's office. Breast MRI was completed but no biopsy was done because they did not see a mass. Patient does not need to see them again, she has been informed by dr chinchilla's office this is just an fyi

## 2025-03-18 NOTE — TELEPHONE ENCOUNTER
Spoke with Agueda at [facility]'s office. Informed her of the MRI BX not being performed due nonmass being viewed with enhancement. See MRI report from visit 02/27/25. Patient in turn canceled appointment with our office so care being referred back to PCP for management unless something else develops. Dr. Alfredo will be happy to see this patient at a later date if any changes were to occur.

## 2025-03-18 NOTE — TELEPHONE ENCOUNTER
See result note from labs-patient is due for follow-up with me in the middle of April.  We can discuss this further to ensure we get repeat imaging 6 months from initial imaging.

## 2025-03-21 ENCOUNTER — ANESTHESIA EVENT (OUTPATIENT)
Dept: SURGERY | Facility: SURGERY CENTER | Age: 70
End: 2025-03-21

## 2025-03-21 ENCOUNTER — ANESTHESIA (OUTPATIENT)
Dept: SURGERY | Facility: SURGERY CENTER | Age: 70
End: 2025-03-21
Payer: MEDICARE

## 2025-03-21 ENCOUNTER — HOSPITAL ENCOUNTER (OUTPATIENT)
Facility: SURGERY CENTER | Age: 70
Setting detail: HOSPITAL OUTPATIENT SURGERY
Discharge: HOME | End: 2025-03-21
Attending: INTERNAL MEDICINE | Admitting: INTERNAL MEDICINE
Payer: MEDICARE

## 2025-03-21 VITALS
RESPIRATION RATE: 16 BRPM | HEIGHT: 63 IN | HEART RATE: 73 BPM | SYSTOLIC BLOOD PRESSURE: 147 MMHG | WEIGHT: 203 LBS | TEMPERATURE: 97.8 F | DIASTOLIC BLOOD PRESSURE: 93 MMHG | OXYGEN SATURATION: 97 % | BODY MASS INDEX: 35.97 KG/M2

## 2025-03-21 PROCEDURE — S0260 H&P FOR SURGERY: HCPCS | Performed by: INTERNAL MEDICINE

## 2025-03-21 PROCEDURE — 25010000002 PROPOFOL 200 MG/20ML EMULSION: Performed by: STUDENT IN AN ORGANIZED HEALTH CARE EDUCATION/TRAINING PROGRAM

## 2025-03-21 PROCEDURE — 43235 EGD DIAGNOSTIC BRUSH WASH: CPT | Performed by: INTERNAL MEDICINE

## 2025-03-21 PROCEDURE — 25810000003 LACTATED RINGERS PER 1000 ML: Performed by: INTERNAL MEDICINE

## 2025-03-21 PROCEDURE — 25010000002 LIDOCAINE 1 % SOLUTION: Performed by: STUDENT IN AN ORGANIZED HEALTH CARE EDUCATION/TRAINING PROGRAM

## 2025-03-21 RX ORDER — LIDOCAINE HYDROCHLORIDE 10 MG/ML
0.5 INJECTION, SOLUTION INFILTRATION; PERINEURAL ONCE AS NEEDED
Status: DISCONTINUED | OUTPATIENT
Start: 2025-03-21 | End: 2025-03-21 | Stop reason: HOSPADM

## 2025-03-21 RX ORDER — PROPOFOL 10 MG/ML
INJECTION, EMULSION INTRAVENOUS AS NEEDED
Status: DISCONTINUED | OUTPATIENT
Start: 2025-03-21 | End: 2025-03-21 | Stop reason: SURG

## 2025-03-21 RX ORDER — LIDOCAINE HYDROCHLORIDE 10 MG/ML
INJECTION, SOLUTION INFILTRATION; PERINEURAL AS NEEDED
Status: DISCONTINUED | OUTPATIENT
Start: 2025-03-21 | End: 2025-03-21 | Stop reason: SURG

## 2025-03-21 RX ORDER — SODIUM CHLORIDE 0.9 % (FLUSH) 0.9 %
10 SYRINGE (ML) INJECTION AS NEEDED
Status: DISCONTINUED | OUTPATIENT
Start: 2025-03-21 | End: 2025-03-21 | Stop reason: HOSPADM

## 2025-03-21 RX ORDER — SODIUM CHLORIDE, SODIUM LACTATE, POTASSIUM CHLORIDE, CALCIUM CHLORIDE 600; 310; 30; 20 MG/100ML; MG/100ML; MG/100ML; MG/100ML
20 INJECTION, SOLUTION INTRAVENOUS CONTINUOUS
Status: DISCONTINUED | OUTPATIENT
Start: 2025-03-21 | End: 2025-03-21 | Stop reason: HOSPADM

## 2025-03-21 RX ADMIN — PROPOFOL 90 MG: 10 INJECTION, EMULSION INTRAVENOUS at 10:27

## 2025-03-21 RX ADMIN — SODIUM CHLORIDE, SODIUM LACTATE, POTASSIUM CHLORIDE, CALCIUM CHLORIDE 20 ML/HR: 20; 30; 600; 310 INJECTION, SOLUTION INTRAVENOUS at 09:28

## 2025-03-21 RX ADMIN — PROPOFOL 200 MCG/KG/MIN: 10 INJECTION, EMULSION INTRAVENOUS at 10:28

## 2025-03-21 RX ADMIN — LIDOCAINE HYDROCHLORIDE 30 MG: 10 INJECTION, SOLUTION INFILTRATION; PERINEURAL at 10:24

## 2025-03-21 NOTE — H&P
[facility], [place] [facility]  Pre Procedure History & Physical    Chief Complaint:   GERD, HH    Subjective     HPI:   69 yo here today for EGD.   She has longstanding GERD controlled with omeprazole.  Last egd 2018.    Past Medical History:   Past Medical History:   Diagnosis Date    Anxiety     Arthritis     GERD (gastroesophageal reflux disease)     Glaucoma     History of kidney infection     Hyperlipidemia     Kidney stones     Migraine        Past Surgical History:  Past Surgical History:   Procedure Laterality Date    BLADDER REPAIR  2008    tack     COLONOSCOPY      ENDOSCOPY      2008    ENDOSCOPY N/A 05/18/2018    Procedure: ESOPHAGOGASTRODUODENOSCOPY WITH COLD BIOPSIES;  Surgeon: Yobani Covarrubias MD;  Location: [facility] ENDOSCOPY;  Service: Gastroenterology    FINGER NERVE REPAIR      HAND SURGERY      HYSTERECTOMY  2007    with bladder tack     OOPHORECTOMY      TUBAL ABDOMINAL LIGATION  1984    UPPER GASTROINTESTINAL ENDOSCOPY  2018    Done at [facility], [place]       Family History:  Family History   Problem Relation Age of Onset    Cancer Mother         stomach    Arthritis Mother     Stomach cancer Mother     Cancer Father         pancreatic    Aortic aneurysm Father     Cancer Paternal Grandmother         OVARIAN    Cancer Maternal Grandfather         LUNG    Malig Hyperthermia Neg Hx     Breast cancer Neg Hx        Social History:   reports that she has never smoked. She has never used smokeless tobacco. She reports that she does not currently use alcohol. She reports that she does not use drugs.    Medications:   Medications Prior to Admission   Medication Sig Dispense Refill Last Dose/Taking    aspirin 325 MG tablet Take 1 tablet by mouth Daily.   3/20/2025 Morning    atorvastatin (LIPITOR) 20 MG tablet Take 1 tablet by mouth Every Night. 90 tablet 0 3/20/2025    cholecalciferol (VITAMIN D3) 25 MCG (1000 UT) tablet    3/20/2025    FLUoxetine (PROzac) 20 MG capsule Take 1 capsule by mouth 2 (Two) Times  "a Day. 180 capsule 0 3/20/2025    folic acid (FOLVITE) 1 MG tablet Take 1 tablet by mouth Daily.   3/20/2025    LOPERAMIDE HCL PO Take  by mouth As Needed.   Past Month    meclizine (ANTIVERT) 25 MG tablet    Past Month    omeprazole (priLOSEC) 20 MG capsule TAKE 1 CAPSULE EVERY DAY 90 capsule 1 3/20/2025    vitamin B-12 (CYANOCOBALAMIN) 1000 MCG tablet Take 1 tablet by mouth 2 (Two) Times a Week.   3/20/2025    cyclobenzaprine (FLEXERIL) 10 MG tablet Take 1 tablet by mouth 3 (Three) Times a Day As Needed for Muscle Spasms. 45 tablet 0 More than a month    methylPREDNISolone (Medrol) 4 MG dose pack follow package directions (Patient not taking: Reported on 2/27/2025) 21 tablet 0        Allergies:  Patient has no known allergies.    ROS:    Pertinent items are noted in HPI     Objective     Blood pressure 158/88, pulse 70, temperature 98.1 °F (36.7 °C), temperature source Temporal, resp. rate 16, height 160 cm (63\"), weight 92.1 kg (203 lb), SpO2 95%, not currently breastfeeding.    Physical Exam   Constitutional: Pt is oriented to person, place, and time and well-developed, well-nourished, and in no distress.   Mouth/Throat: Oropharynx is clear and moist.   Neck: Normal range of motion.   Cardiovascular: Normal rate, regular rhythm    Pulmonary/Chest: Effort normal    Abdominal: Soft. Nontender  Skin: Skin is warm and dry.   Psychiatric: Mood, memory, affect and judgment normal.     Assessment & Plan     Diagnosis:  GERD, HH    Anticipated Surgical Procedure:  esophagogastroduodenoscopy    The risks, benefits, and alternatives of this procedure have been discussed with the patient or the responsible party- the patient understands and agrees to proceed.                                                              "

## 2025-03-21 NOTE — BRIEF OP NOTE
ESOPHAGOGASTRODUODENOSCOPY  Progress Note    Virginia Leiva  3/21/2025    Pre-op Diagnosis:   Gastroesophageal reflux disease, unspecified whether esophagitis present [K21.9]  Hiatal hernia [K44.9]       Post-Op Diagnosis Codes:     * Gastroesophageal reflux disease, unspecified whether esophagitis present [K21.9]     * Hiatal hernia [K44.9]    Procedure(s):      Procedure(s):  ESOPHAGOGASTRODUODENOSCOPY              Surgeon(s):  Le Loera MD    Anesthesia: Monitored Anesthesia Care    Staff:   Endo Technician: Charissa Ventura RN  Endo Nurse: Summer Garcia RN       Estimated Blood Loss: none    Urine Voided: * No values recorded between 3/21/2025 10:23 AM and 3/21/2025 10:34 AM *    Specimens:                None      Drains: * No LDAs found *    Findings:   3-4 cm HH  Otherwise normal egd to second portion of the duodenum      Complications: None          Le Loera MD     Date: 3/21/2025  Time: 10:45 EDT

## 2025-03-21 NOTE — ANESTHESIA POSTPROCEDURE EVALUATION
Patient: Virginia Leiva    Procedure Summary       Date: 03/21/25 Room / Location: [facility] / [facility]    Anesthesia Start: 1023 Anesthesia Stop: 1037    Procedure: ESOPHAGOGASTRODUODENOSCOPY (Esophagus) Diagnosis:       Gastroesophageal reflux disease, unspecified whether esophagitis present      Hiatal hernia      (Gastroesophageal reflux disease, unspecified whether esophagitis present [K21.9])      (Hiatal hernia [K44.9])    Surgeons: Le Loera MD Provider: Katrin Meyer MD    Anesthesia Type: MAC ASA Status: 2            Anesthesia Type: MAC    Vitals  Vitals Value Taken Time   /93 03/21/25 11:04   Temp 36.6 °C (97.8 °F) 03/21/25 10:40   Pulse 64 03/21/25 10:57   Resp 16 03/21/25 10:55   SpO2 98 % 03/21/25 10:57   Vitals shown include unfiled device data.        Post Anesthesia Care and Evaluation    Patient location during evaluation: PHASE II  Level of consciousness: awake  Pain management: adequate    Airway patency: patent  Anesthetic complications: No anesthetic complications  PONV Status: none  Cardiovascular status: acceptable  Respiratory status: acceptable  Hydration status: acceptable

## 2025-03-21 NOTE — ANESTHESIA PREPROCEDURE EVALUATION
" Anesthesia Evaluation     Patient summary reviewed and Nursing notes reviewed   no history of anesthetic complications:   NPO Solid Status: > 8 hours  NPO Liquid Status: > 2 hours           Airway   Mallampati: II  TM distance: >3 FB  No difficulty expected  Dental - normal exam     Pulmonary - normal exam   (-) COPD, asthma  Cardiovascular   Exercise tolerance: good (4-7 METS)    Rhythm: regular    (+) hyperlipidemia,  carotid artery disease  (-) past MI, angina, cardiac stents      Neuro/Psych  (+) headaches, psychiatric history Anxiety and Depression  (-) seizures, CVA  GI/Hepatic/Renal/Endo    (+) obesity, hiatal hernia, GERD, renal disease- stones  (-) liver disease    Musculoskeletal     (+) neck pain  Abdominal    Substance History - negative use     OB/GYN          Other   arthritis,                       Anesthesia Plan    ASA 2     MAC     (I have reviewed the patient's history and chart with the patient, including all pertinent laboratory results and imaging. I have explained the risks of monitored anesthesia care including but not limited to respiratory depression, possible need for airway intervention, or possible intra-op recall. I explained that airway intervention involves risk of possible dental injury.    VITALS:  /88 (BP Location: Left arm, Patient Position: Lying)   Pulse 70   Temp 36.7 °C (98.1 °F) (Temporal)   Resp 16   Ht 160 cm (63\")   Wt 92.1 kg (203 lb)   SpO2 95%   BMI 35.96 kg/m² )  intravenous induction     Anesthetic plan, risks, benefits, and alternatives have been provided, discussed and informed consent has been obtained with: patient.  Pre-procedure education provided      CODE STATUS:         "

## 2025-03-26 ENCOUNTER — OFFICE VISIT (OUTPATIENT)
Dept: FAMILY MEDICINE CLINIC | Facility: CLINIC | Age: 70
End: 2025-03-26
Payer: MEDICARE

## 2025-03-26 VITALS
HEIGHT: 63 IN | OXYGEN SATURATION: 99 % | RESPIRATION RATE: 16 BRPM | DIASTOLIC BLOOD PRESSURE: 74 MMHG | SYSTOLIC BLOOD PRESSURE: 122 MMHG | HEART RATE: 85 BPM | TEMPERATURE: 97.9 F | BODY MASS INDEX: 36.14 KG/M2 | WEIGHT: 204 LBS

## 2025-03-26 DIAGNOSIS — R42 DIZZINESS: ICD-10-CM

## 2025-03-26 DIAGNOSIS — G44.89 OTHER HEADACHE SYNDROME: Primary | ICD-10-CM

## 2025-03-26 DIAGNOSIS — M47.812 FACET ARTHROPATHY, CERVICAL: ICD-10-CM

## 2025-03-26 DIAGNOSIS — H93.19 TINNITUS, UNSPECIFIED LATERALITY: ICD-10-CM

## 2025-03-26 DIAGNOSIS — R27.0 ATAXIA: ICD-10-CM

## 2025-03-26 DIAGNOSIS — G45.0 VERTEBROBASILAR INSUFFICIENCY: ICD-10-CM

## 2025-03-26 DIAGNOSIS — R26.9 ABNORMAL GAIT: ICD-10-CM

## 2025-03-26 DIAGNOSIS — I65.23 ATHEROSCLEROSIS OF BOTH CAROTID ARTERIES: ICD-10-CM

## 2025-03-26 RX ORDER — CEFDINIR 300 MG/1
300 CAPSULE ORAL 2 TIMES DAILY
Qty: 20 CAPSULE | Refills: 0 | Status: SHIPPED | OUTPATIENT
Start: 2025-03-26

## 2025-03-26 NOTE — PROGRESS NOTES
"Subjective   Virginia Leiva is a 69 y.o. female who presents today in follow-up of hyperlipidemia, vitamin D and B12 deficiencies, macrocytosis, GERD, moods, dizziness, back pain, arthritis, and history of fecal incontenence.      Dizziness mri possible  Symptoms are: chronic.   Onset was more than 5 years.   Symptoms occur: constantly.  Symptoms include: joint pain, fatigue, headaches and vertigo.   Pertinent negative symptoms include no abdominal pain, no anorexia, no change in stool, no chest pain, no chills, no congestion, no cough, no diaphoresis, no fever, no joint swelling, no myalgias, no nausea, no neck pain, no numbness, no rash, no sore throat, no swollen glands, no dysuria, no visual change, no vomiting and no weakness.   Treatment and/or Medications comments include: Meclazine     Dizziness/tinnitus, headache-2000- was going to [place] with the kids. She started with similar symptoms- dizzy, couldn't hold head up and felt horrible and would improve and would wake up the next day and feel the same- went to  and had scan and felt nothing wrong. Would get up and felt bad for about 1 week then resolved.   This recurred years ago and did not improve.   Staying tired but hard to do things when she has dizziness.  Dizziness not completely gone but improved.   She was seen by [facility] and started therapy through them.  Is finally helping.  She still has some dizziness but is finally improving.  Neurology was unable to find etiology of dizziness.  She continues with dizziness. Left top of head and parietal area. She wakes up with a headache- mostly on the left but some on the right as well. She notices that when she leans her head back and keeps it steady, she has improvement in dizziness. No neck pain, numbness or tingling in arms. Feels like \"when you sleep too much and wake up\"- feels that way all the time. At night, not moving much. Does not wake up but when she gets up, she feels weird. Affects " the way she feels about things.   Dizziness started 2/2020. When she gets up, she feels like her eyes are twitching and very occasionally feels some spinning. Symptoms occur seldom when she was driving down the road, and she got nervous to focus and see. No consistent dizziness with head turning and no dizziness with laying down. She has had ringing in her ears for years with normal hearing and no ear pain. If she lays on 1 side too long, she has a headache- worse on the left. She denies confusion, trouble speaking, trouble using arms/ legs, and blurred vision. She has had numbness in hands intermittent but no other numbness or neurological symptoms.   Last opt/ophthalmology was > 1 year ago. She was advised to go for annual eye exam.  I referred for MRI brain and IAC with opacified hypoplastic right sphenoid sinus. She was treated for sinusitis and advised we can send to ENT.   Carotid duplex with bilateral plaque without stenosis.    ENT- Dr Lozano- he did ENG and testing. Told slight hearing loss but no other etiology of symtpoms.   Neurosurgery-     Unsteady gait, neck pain, DDD/OA/facet arthropathy cervical spine, dizziness- no numbness, tingling or weakness in arm or hand.  MRI lumbar spine with mild scoliosis, arthritis, and degenerative changes.  Inferior tip of the conus medullaris posterior midline thecal sac at L2-3-borderline but thought to be lower limits of normal.  Very mild dextroscoliotic curvature of the lumbar spine with apex at L3-4, L2-3 minimal posterior spurring with left paracentral disc protrusion or tiny disc herniation with minimal indentation of the left ventral aspect of the thecal sac and minimally narrows the left sided canal, minimal left foraminal narrowing L2-3.  L4-5-tiny posterior annular fissure with posterior central disc bulge and minimal facet overgrowth and minimal if any canal narrowing.  Mild disc space narrowing degenerative endplate changes L5-S1 with mild bilateral facet  overgrowth and mild posterior spurring.  Referred to neurosurgery  Seen by Dr. Archuleta-referred to physical therapy.  Follow-up as needed  Back pain-every once in a while has something but if takes a muscle relaxer x 1, is ok.   No worsening, changing, new or different symptoms. She has not had any increased symptoms.  Patient uses Flexeril only occasionally with back pain. She was told it was arthritis at some point.   History of Fecal incontinence- no recurrence. Doing ok.   She noted change in BM 8/2018 with increased fecal urgency and incontinence.  She also had stress fecal incontinence.  She had significant hemorrhoids on exam and possible mild rectal prolapse as well with decreased rectal tone.  Patient wanted to start with follow-up with Dr. Covarrubais but did not schedule follow-up with him.  She reports symptoms improved.    I discussed the need for follow-up with GI, MRI lumbosacral region, and consideration of further neurological work-up as well as consideration of pelvic floor strengthening PT.  She did not want to proceed with further work-up at that time and has had no recurrence of the symptoms      From 12/2024-   Hyperlipidemia-has done well with starting atorvastatin and is tolerating without AE.  10-year risk 6.5% 5/2024.  Prediabetes, elevated fasting glucose-A1c 5.6% 11/2023.  A1c increased to 5.8% 5/2024.  Vitamin D-taking 2000 IU once daily.  Advised to increase vitamin D to 2000 IU daily 3/2023.  Macrocytosis, B12-taking B12 500 mcg once weekly.  Now on folic acid instead of MTV.   Biotin deficiency-advised to take biotin 100 mcg daily or 1000 mcg once weekly 5/2024. Taking biotin 1000 mcg once weekly.   Abnormal renal function- was told by neurosurgery could take Naprosyn x 3 if pain. She does not take all the time but took a few days ago. Got better with heating pad.     Kidney stone- no issues now.   She previously went to ER for kidney stone, hydronephrosis, diverticulosis, abdominal pain,   diaphragmatic hernia. Not sure but thought could have passed. Follow up with urology.   Patient went to ER 4/222/2023 for flank pain, nausea, and vomiting. Called urology- start antibiotics and follow up outpatient. Given Norco, Zofran, Keflex given in the hospital and discharged on Keflex 500 mg 4 times daily for 5 days.  Patient asked that medication be changed to Toradol-MB declined in association of patient's age.  Urine sample contaminated with stool.  CT abd/pelvis-multiple tiny bilateral nonobstructing renal stones.  3 mm right UPJ stone with mild to moderate right-sided hydronephrosis.  Extensive sigmoid diverticulosis without diverticulitis.  Fat-containing left inguinal hernia.  Mild to moderate degenerative changes lumbar spine.  UA- protein, hematuria, leuk est, and 4+ bacteria.   Creatinine 1.21  Seen by Dr Chaudhari    GERD- ok with taking medication- Prilosec without breakthrough symptoms.   If misses a dose, she feels it immediately. Did have hamburger get stuck a ballpark  She had an EGD 5/2018 without stricture or need for dilation (history of stricture in the past).  She was noted to have medium/moderate hiatal hernia and mucosa was suggestive of eosinophilic esophagitis with negative biopsies for eosinophils.  He recommended repeat EGD in 3 to 5 years.    Moods- increased to Prozac 20 mg twice daily- has been doing ok.   Prozac 20 mg once daily- tolerated without AE but felt she was snappy and crying easily. She was unsure it was working as well. Not sure if it was related to dizziness or her daughter's stressors or 's issues. She was still working from home 2 days a week and at work 3 days.     Arthritis-She uses Tylenol as needed.  She had right MCP surgery 10/2018 and left MCP surgery 12/2018 and healed well.    She also has chronic left knee pain, intermittent feels like it going to give out. She has been seen by orthopedic surgery with arthritis of her knees.    Skin discoloration,  axillary abnormality- has not changed but continues. Not bothersome. Fading but not gone. She does not want to see dermatology.   Has discoloration of skin left axilla- now spreading- was read and now not as red. No pain or itching. It was really read and now faded but has spread in size.     Patient's Specialists:  Cardiology- Dr South- last appt 9/2018 for preop cleareance. EKG thought to be negative. Hyperlipidemia- advised low dose statin and risk reduction. Follow up PRN.   GI-Dr Covarrubias- last EGD 5/2018 medium hiatal hernia. Repeat in 3-5 years.   Hand surgery- Dr Tejada- last appt 1/2021 in follow up of radial digital nerve laceration left index finger with repair with vein graft 12/2020. Follow up in 3 months.   Prior 1/2019 for left thumb carpometacarpal arthoplasty with LRTI and left de Quervain's release.   Neurology- Dr Carroll- last appt 8/2021 for dizziness, vertebrobasilar insufficiency, intracranial cerebrovascular stenosis.  Increase aspirin to 325 mg daily.  Referred to neurosurgery for possible arteriogram to determine outpouching as infundibulum versus aneurysm.  Advised him sleep study.  Follow-up after sleep study.   Neurosurgery-Dr. Quiñones- no vertebrobasilar stenosis or vertebral artery narrowing bilaterally.  No vascular explanation for symptoms.  Follow-up as needed.  Orthopedic surgery- Dr Lee Roberto- last appt 8/2017 for patellofemoral arthritis left knee. Received injection. Advied try tumeric or glucosamine. Follow up PRN.   ENT-Dr. Severtson-last appt 3/2022 for generalized disequilibrium, dizziness, tinnitus, and allergic rhinitis.  Advised increased activity level, RADHA through [facility], avoid caffeine, nicotine, chocolate, and salt.  CBT versus TCA for tinnitus.  Consider hearing aid evaluation.  Prior Dr Lozano- . Treated a second time for blocked sphenoid sinus.   Podiatry- Dr Julian- last appt 7/2020 for 5th metatarsal fracture. Advised MELONIE boot.     The following  portions of the patient's history were reviewed and updated as appropriate: allergies, current medications, past family history, past medical history, past social history, past surgical history and problem list.    Review of Systems   Constitutional:  Positive for fatigue. Negative for chills, diaphoresis and fever.   HENT:  Negative for congestion and sore throat.    Respiratory:  Negative for cough.    Cardiovascular:  Negative for chest pain.   Gastrointestinal:  Negative for abdominal pain, anorexia, nausea and vomiting.   Genitourinary:  Negative for dysuria.   Musculoskeletal:  Positive for joint pain. Negative for myalgias and neck pain.   Skin:  Negative for rash.   Neurological:  Positive for vertigo and headaches. Negative for weakness and numbness.       Objective    Vitals:    03/26/25 1147   BP: 122/74   Pulse: 85   Resp: 16   Temp: 97.9 °F (36.6 °C)   SpO2: 99%     Body mass index is 36.15 kg/m².     Physical Exam  Vitals and nursing note reviewed.   Constitutional:       Appearance: She is well-developed.   HENT:      Head: Normocephalic and atraumatic.      Right Ear: External ear normal.      Left Ear: External ear normal.      Nose: Nose normal.   Eyes:      General: Lids are normal.      Conjunctiva/sclera: Conjunctivae normal.   Neck:      Vascular: No carotid bruit.   Cardiovascular:      Rate and Rhythm: Normal rate and regular rhythm.      Heart sounds: Normal heart sounds. No murmur heard.     No friction rub. No gallop.   Pulmonary:      Effort: Pulmonary effort is normal. No respiratory distress.      Breath sounds: Normal breath sounds. No wheezing, rhonchi or rales.   Musculoskeletal:         General: No deformity.      Cervical back: Neck supple.   Skin:     General: Skin is warm and dry.   Neurological:      Mental Status: She is alert and oriented to person, place, and time.      Gait: Gait normal.   Psychiatric:         Speech: Speech normal.         Behavior: Behavior normal.          Thought Content: Thought content normal.         Assessment & Plan   Diagnoses and all orders for this visit:    1. Other headache syndrome (Primary)  -     cefdinir (OMNICEF) 300 MG capsule; Take 1 capsule by mouth 2 (Two) Times a Day.  Dispense: 20 capsule; Refill: 0    2. Dizziness    3. Ataxia    4. Abnormal gait    5. Vertebrobasilar insufficiency    6. Atherosclerosis of both carotid arteries    7. Tinnitus, unspecified laterality    8. Facet arthropathy, cervical           Assessment and plan  Patient to follow up after neurology appt and we will complete fasting labs at follow up and review neurology.    Dizziness/ Tinnitus, headache- I referred for MRI brain and IAC as well as carotid duplex with some sinus disease but otherwise negative. CTA head and neck to ensure no cerbrovascular etiology of symptoms and advised BPPV physical therapy/ exercises. She was seen by ENT who thought there could be a vascular etiology. They ordered VNG- no etiology of symptoms. She was seen by neurology who also thought some cerebrovascular disease and advised Aspirin 325 mg daily and sleep study.  She had possible abnormal CTA head and neck and was referred to neurosurgery.  She was seen by neurosurgery and underwent angiogram without etiology. MRI cervical spine with mild to moderate OA and DDD with spinal stenosis and possible left nerve root involvement.  She was seen again by neurosurgery without recommendations for intervention.  I repeat carotid duplex and labs.  She was doing PT with [facility]. She is now having headaches and continued dizziness and inability to perform daily activities with symptoms. I referred back to neurology for evaluation and she has appt with them.  She reports family member with Chiari malformation and has concerns about this. Previous MRI did not show Chiari malformation. She has upcoming neurology appt in about 1 month. I will check with them to see if they would prefer I order MRI and  how they want MRI ordered or if they would prefer to see patient and order MRI themselves.  Consideration of tertiary center if no etiology, as her dizziness and gait abnormality limit her life.    Cervical spine DDD, facet arthropathy, OA- MRI 2/2022 with mild to moderate OA and degenerative changes, minimal spinal stenosis at C5-6, and possible left nerve root involvement of C6 left nerve root.  She was seen by neurosurgery with no intervention recommended.  To be seen if worsening, new or changing symptoms.  Back pain, scoliosis, DJD and DDD lumbar spine, spinal stenosis- She was seen by neurosurgery with no intervention recommended. She is having worsening pain, and I checked autoimmune testing and gave Medrol dose pack as directed and Flexeril as needed. Consideration of PT, additional imaging, and specialist follow up if persistent worsening. To be seen ASAP if worsening, new or changing symptoms.  Arthritis- Patient with hip pain. I gave Medrol dose pack and Flexeril as needed. Continue Tylenol as needed.  I have counseled her on my recommendations to avoid naproxen if possible.  To see hand surgery or orthopedic surgery if worsening.  Fecal incontinence- If recurrence of symptoms or other symptoms, consider follow up with GI, the need for MRI lumbosacral region, consideration of further neurological work-up, as well as consideration of pelvic floor strengthening PT.  She should let me know if she is willing to proceed with further work-up.    From 12/2024-   Hyperlipidemia-Last lipids were up about 30 points. Continue atorvastatin 10 mg at bedtime.  Further treatment recommendations pending labs.     Vitamin D deficiency-Continue vitamin D 2000 IU daily. Dosing recommendations following labs.   B12 deficiency-Continue B12 500 mcg daily. Await labs for further recommendations.    Biotin deficiency- Continue Biotin 1000 mcg weekly. Further recommendations for dosing pending results.   Abnormal renal function-  Patient should avoid NSAIDs and ensure proper hydration.  Unfortunately, she is taking naproxen as needed for pain. I will recheck labs and make further recommendations.  GERD, history of esophageal stricture-Controlled symptoms as long as she does not miss medication but has symptoms with any missed doses.  She did have 1 episode of hamburger getting stuck in her throat. Continue Prilosec 20 mg once daily. Patient needs to see GI in follow-up for consideration of repeat EGD and to see if they recommend a colonoscopy for screening rather than Cologuard.  Depression with anxiety- Moods were uncontrolled with increased stressors and irritability. She has had improvement with Prozac 20 mg twice daily.  Continue current dose.  To be seen ASAP if worsening moods or AE with medication.     From [facility] 5/2024. She due for mammogram 6/2024 and DEXA 6/2025. She declines colonoscopy- She is up to date on Cologuard until 2026. She should contact pharmacy about updating Pneumovax or Prevnar 20 and RSV vaccination.    I spent 30 minutes caring for Virginia Leiva on this date of service. This time includes time spent by me in the following activities: preparing for the visit, reviewing tests, specialists records, and previous visits, obtaining and/or reviewing a separately obtained history, performing a medically appropriate examination and/or evaluation, counseling and educating the patient/family/caregiver, referring and/or communicating with other health care professionals as necessary, documenting information in the medical record, independently interpreting results and communicating that information with the patient/family/caregiver, and developing a medically appropriate treatment plan with consideration of other conditions, medications, and treatments.

## 2025-04-02 ENCOUNTER — TELEPHONE (OUTPATIENT)
Dept: FAMILY MEDICINE CLINIC | Facility: CLINIC | Age: 70
End: 2025-04-02
Payer: MEDICARE

## 2025-04-02 NOTE — TELEPHONE ENCOUNTER
Left message to call OK FOR HUB TO RELAY    Please let patient know neurology did not want to order MRI and will discuss with her on the 29th

## 2025-04-16 ENCOUNTER — TELEPHONE (OUTPATIENT)
Dept: FAMILY MEDICINE CLINIC | Facility: CLINIC | Age: 70
End: 2025-04-16
Payer: MEDICARE

## 2025-04-16 NOTE — TELEPHONE ENCOUNTER
Caller: Virginia Leiva    Relationship to patient: Self    Best call back number:[phone]      Patient is needing:     PATIENT IS CALLING IN NEEDING TO SCHEDULE A LAB APPOINTMENT.     PLEASE CALL TO SCHEDULE.

## 2025-04-16 NOTE — TELEPHONE ENCOUNTER
PER PROVIDERS LAST LAB RESULT ON 1/25/25 STATED PT TO COME BACK IN 3 MONTHS FOR A VISIT AND FASTING LABS AT HER APPT

## 2025-04-25 DIAGNOSIS — R73.03 PREDIABETES: ICD-10-CM

## 2025-04-25 DIAGNOSIS — N28.9 ABNORMAL RENAL FUNCTION: ICD-10-CM

## 2025-04-25 DIAGNOSIS — E55.9 VITAMIN D DEFICIENCY: ICD-10-CM

## 2025-04-25 DIAGNOSIS — R79.9 ABNORMAL FINDING OF BLOOD CHEMISTRY, UNSPECIFIED: ICD-10-CM

## 2025-04-25 DIAGNOSIS — D75.89 MACROCYTOSIS: ICD-10-CM

## 2025-04-25 DIAGNOSIS — E53.8 BIOTIN DEFICIENCY: ICD-10-CM

## 2025-04-25 DIAGNOSIS — E78.49 OTHER HYPERLIPIDEMIA: Primary | ICD-10-CM

## 2025-04-25 DIAGNOSIS — E53.8 B12 DEFICIENCY: ICD-10-CM

## 2025-04-25 DIAGNOSIS — N20.0 KIDNEY STONES: ICD-10-CM

## 2025-04-30 LAB
25(OH)D3+25(OH)D2 SERPL-MCNC: 39.3 NG/ML (ref 30–100)
ALBUMIN SERPL-MCNC: 4.2 G/DL (ref 3.9–4.9)
ALP SERPL-CCNC: 104 IU/L (ref 44–121)
ALT SERPL-CCNC: 19 IU/L (ref 0–32)
AST SERPL-CCNC: 19 IU/L (ref 0–40)
BASOPHILS # BLD AUTO: 0 X10E3/UL (ref 0–0.2)
BASOPHILS NFR BLD AUTO: 1 %
BILIRUB SERPL-MCNC: 0.9 MG/DL (ref 0–1.2)
BIOTIN SERPL-MCNC: 0.37 NG/ML (ref 0.05–0.83)
BUN SERPL-MCNC: 15 MG/DL (ref 8–27)
BUN/CREAT SERPL: 15 (ref 12–28)
CALCIUM SERPL-MCNC: 9.5 MG/DL (ref 8.7–10.3)
CHLORIDE SERPL-SCNC: 103 MMOL/L (ref 96–106)
CHOLEST SERPL-MCNC: 155 MG/DL (ref 100–199)
CK SERPL-CCNC: 80 U/L (ref 32–182)
CO2 SERPL-SCNC: 23 MMOL/L (ref 20–29)
CREAT SERPL-MCNC: 0.99 MG/DL (ref 0.57–1)
EGFRCR SERPLBLD CKD-EPI 2021: 62 ML/MIN/1.73
EOSINOPHIL # BLD AUTO: 0.1 X10E3/UL (ref 0–0.4)
EOSINOPHIL NFR BLD AUTO: 2 %
ERYTHROCYTE [DISTWIDTH] IN BLOOD BY AUTOMATED COUNT: 12.3 % (ref 11.7–15.4)
FERRITIN SERPL-MCNC: 51 NG/ML (ref 15–150)
FOLATE SERPL-MCNC: >20 NG/ML
GLOBULIN SER CALC-MCNC: 2.3 G/DL (ref 1.5–4.5)
GLUCOSE SERPL-MCNC: 90 MG/DL (ref 70–99)
HBA1C MFR BLD: 5.9 % (ref 4.8–5.6)
HCT VFR BLD AUTO: 42.6 % (ref 34–46.6)
HDLC SERPL-MCNC: 52 MG/DL
HGB BLD-MCNC: 13.7 G/DL (ref 11.1–15.9)
IMM GRANULOCYTES # BLD AUTO: 0 X10E3/UL (ref 0–0.1)
IMM GRANULOCYTES NFR BLD AUTO: 0 %
IRON SATN MFR SERPL: 30 % (ref 15–55)
IRON SERPL-MCNC: 94 UG/DL (ref 27–139)
LDLC SERPL CALC-MCNC: 87 MG/DL (ref 0–99)
LDLC/HDLC SERPL: 1.7 RATIO (ref 0–3.2)
LYMPHOCYTES # BLD AUTO: 2 X10E3/UL (ref 0.7–3.1)
LYMPHOCYTES NFR BLD AUTO: 35 %
MCH RBC QN AUTO: 30.7 PG (ref 26.6–33)
MCHC RBC AUTO-ENTMCNC: 32.2 G/DL (ref 31.5–35.7)
MCV RBC AUTO: 96 FL (ref 79–97)
MONOCYTES # BLD AUTO: 0.3 X10E3/UL (ref 0.1–0.9)
MONOCYTES NFR BLD AUTO: 6 %
NEUTROPHILS # BLD AUTO: 3.1 X10E3/UL (ref 1.4–7)
NEUTROPHILS NFR BLD AUTO: 56 %
PLATELET # BLD AUTO: 184 X10E3/UL (ref 150–450)
POTASSIUM SERPL-SCNC: 4.1 MMOL/L (ref 3.5–5.2)
PROT SERPL-MCNC: 6.5 G/DL (ref 6–8.5)
RBC # BLD AUTO: 4.46 X10E6/UL (ref 3.77–5.28)
SODIUM SERPL-SCNC: 139 MMOL/L (ref 134–144)
T3FREE SERPL-MCNC: 3.2 PG/ML (ref 2–4.4)
T4 FREE SERPL-MCNC: 1.19 NG/DL (ref 0.82–1.77)
TIBC SERPL-MCNC: 318 UG/DL (ref 250–450)
TRIGL SERPL-MCNC: 87 MG/DL (ref 0–149)
TSH SERPL DL<=0.005 MIU/L-ACNC: 0.91 UIU/ML (ref 0.45–4.5)
UIBC SERPL-MCNC: 224 UG/DL (ref 118–369)
UNABLE TO VOID: NORMAL
URATE SERPL-MCNC: 4.8 MG/DL (ref 3–7.2)
VIT B12 SERPL-MCNC: 568 PG/ML (ref 232–1245)
VLDLC SERPL CALC-MCNC: 16 MG/DL (ref 5–40)
WBC # BLD AUTO: 5.5 X10E3/UL (ref 3.4–10.8)

## 2025-05-10 DIAGNOSIS — K21.9 GASTROESOPHAGEAL REFLUX DISEASE: ICD-10-CM

## 2025-05-10 DIAGNOSIS — K22.2 ESOPHAGEAL STRICTURE: ICD-10-CM

## 2025-05-23 ENCOUNTER — OFFICE VISIT (OUTPATIENT)
Dept: FAMILY MEDICINE CLINIC | Facility: CLINIC | Age: 70
End: 2025-05-23
Payer: MEDICARE

## 2025-05-23 VITALS
RESPIRATION RATE: 16 BRPM | WEIGHT: 203.4 LBS | HEIGHT: 63 IN | TEMPERATURE: 97.8 F | SYSTOLIC BLOOD PRESSURE: 138 MMHG | HEART RATE: 64 BPM | DIASTOLIC BLOOD PRESSURE: 88 MMHG | OXYGEN SATURATION: 98 % | BODY MASS INDEX: 36.04 KG/M2

## 2025-05-23 DIAGNOSIS — Z00.00 MEDICARE ANNUAL WELLNESS VISIT, SUBSEQUENT: Primary | ICD-10-CM

## 2025-05-23 DIAGNOSIS — R27.0 ATAXIA: ICD-10-CM

## 2025-05-23 DIAGNOSIS — D75.89 MACROCYTOSIS: ICD-10-CM

## 2025-05-23 DIAGNOSIS — E78.49 OTHER HYPERLIPIDEMIA: ICD-10-CM

## 2025-05-23 DIAGNOSIS — E53.8 BIOTIN DEFICIENCY: ICD-10-CM

## 2025-05-23 DIAGNOSIS — R92.8 ABNORMAL MRI, BREAST: ICD-10-CM

## 2025-05-23 DIAGNOSIS — K21.9 GASTROESOPHAGEAL REFLUX DISEASE WITHOUT ESOPHAGITIS: ICD-10-CM

## 2025-05-23 DIAGNOSIS — M19.90 ARTHRITIS: ICD-10-CM

## 2025-05-23 DIAGNOSIS — R73.03 PREDIABETES: ICD-10-CM

## 2025-05-23 DIAGNOSIS — N20.0 KIDNEY STONES: ICD-10-CM

## 2025-05-23 DIAGNOSIS — R26.9 ABNORMAL GAIT: ICD-10-CM

## 2025-05-23 DIAGNOSIS — M54.42 CHRONIC BILATERAL LOW BACK PAIN WITH SCIATICA, SCIATICA LATERALITY UNSPECIFIED: ICD-10-CM

## 2025-05-23 DIAGNOSIS — M51.362 DEGENERATION OF INTERVERTEBRAL DISC OF LUMBAR REGION WITH DISCOGENIC BACK PAIN AND LOWER EXTREMITY PAIN: ICD-10-CM

## 2025-05-23 DIAGNOSIS — E55.9 VITAMIN D DEFICIENCY: ICD-10-CM

## 2025-05-23 DIAGNOSIS — M25.559 HIP PAIN, UNSPECIFIED LATERALITY: ICD-10-CM

## 2025-05-23 DIAGNOSIS — K22.2 ESOPHAGEAL STRICTURE: ICD-10-CM

## 2025-05-23 DIAGNOSIS — M54.41 CHRONIC BILATERAL LOW BACK PAIN WITH SCIATICA, SCIATICA LATERALITY UNSPECIFIED: ICD-10-CM

## 2025-05-23 DIAGNOSIS — Z80.0 FAMILY HISTORY OF GI MALIGNANCY: ICD-10-CM

## 2025-05-23 DIAGNOSIS — E53.8 B12 DEFICIENCY: ICD-10-CM

## 2025-05-23 DIAGNOSIS — N28.9 ABNORMAL RENAL FUNCTION: ICD-10-CM

## 2025-05-23 DIAGNOSIS — G45.0 VERTEBROBASILAR INSUFFICIENCY: ICD-10-CM

## 2025-05-23 DIAGNOSIS — R42 DIZZINESS: ICD-10-CM

## 2025-05-23 DIAGNOSIS — M47.812 FACET ARTHROPATHY, CERVICAL: ICD-10-CM

## 2025-05-23 DIAGNOSIS — M50.30 DEGENERATIVE DISC DISEASE, CERVICAL: ICD-10-CM

## 2025-05-23 DIAGNOSIS — G89.29 BILATERAL CHRONIC KNEE PAIN: ICD-10-CM

## 2025-05-23 DIAGNOSIS — G44.89 OTHER HEADACHE SYNDROME: ICD-10-CM

## 2025-05-23 DIAGNOSIS — I65.23 ATHEROSCLEROSIS OF BOTH CAROTID ARTERIES: ICD-10-CM

## 2025-05-23 DIAGNOSIS — M25.561 BILATERAL CHRONIC KNEE PAIN: ICD-10-CM

## 2025-05-23 DIAGNOSIS — M25.562 BILATERAL CHRONIC KNEE PAIN: ICD-10-CM

## 2025-05-23 DIAGNOSIS — K62.89 DECREASED ANAL SPHINCTER TONE: ICD-10-CM

## 2025-05-23 DIAGNOSIS — F41.8 DEPRESSION WITH ANXIETY: ICD-10-CM

## 2025-05-23 DIAGNOSIS — M19.049 CMC ARTHRITIS: ICD-10-CM

## 2025-05-23 DIAGNOSIS — G89.29 CHRONIC BILATERAL LOW BACK PAIN WITH SCIATICA, SCIATICA LATERALITY UNSPECIFIED: ICD-10-CM

## 2025-05-23 DIAGNOSIS — H93.19 TINNITUS, UNSPECIFIED LATERALITY: ICD-10-CM

## 2025-05-23 RX ORDER — BETAHISTINE HCL 100 %
POWDER (GRAM) MISCELLANEOUS
COMMUNITY
Start: 2025-04-30

## 2025-05-23 NOTE — PROGRESS NOTES
Subjective   The ABCs of the Annual Wellness Visit  Medicare Wellness Visit      Virginia Leiva is a 69 y.o. patient who presents for a Medicare Wellness Visit.    Last Pap- hysterectomy- 2007. No hx abnormal PAP  Last mammogram- 8/2024- diagnostic- resume annual mammogram in 6/2025.   MRI breast-   6/2023-negative.  Ordered 2018- not performed  Last DEXA-6/2023-osteopenia.-Lumbar spine T-score -0.3, left hip T-score -1.0, right hip T-score -1.2.  10-year risk of major osteoporotic fracture 7.5%, 10-year risk hip fracture 0.9%.  Advised increase dietary calcium and weightbearing exercise.  Recheck 2 years.  Last colonoscopy- had colonoscopy x 1- no polyp. No FHx colon cancer.  Cologuard - 3/2023.  EGD 5/2019--Dr. Covarrubias-medium hiatal hernia, mucosal changes middle third of the esophagus and lower third of the esophagus.  Repeat EGD 3 to 5 years.  Last Tdap- 7/2015  Prevnar- 1/2019  Pneumovax- has not had  Hepatitis A- 5/2018, 11/2018  Last flu shot- 10/2023.   Shingrix- had varicella as a child. Shingrix 7/2021, 9/2021  Covid 19-  Pfizer- 3/2021, 4/2021, 10/2021, 4/2022, moderna bivalent 10/2022, Spikevax- 10/2023.   RSV-has not had vaccination    The following portions of the patient's history were reviewed and   updated as appropriate: allergies, current medications, past family history, past medical history, past social history, past surgical history, and problem list.    Compared to one year ago, the patient's physical   health is worse. Dizziness.   Compared to one year ago, the patient's mental   health is the same.    Recent Hospitalizations:  She was not admitted to the hospital during the last year.     Current Medical Providers:  Patient Care Team:  Kacey Hernadez PA as PCP - General (Family Medicine)  Maury Julian DPM as Consulting Physician (Podiatry)    Outpatient Medications Prior to Visit   Medication Sig Dispense Refill    aspirin 325 MG tablet Take 1 tablet by mouth Daily.       atorvastatin (LIPITOR) 20 MG tablet Take 1 tablet by mouth Every Night. 90 tablet 0    cholecalciferol (VITAMIN D3) 25 MCG (1000 UT) tablet       cyclobenzaprine (FLEXERIL) 10 MG tablet Take 1 tablet by mouth 3 (Three) Times a Day As Needed for Muscle Spasms. 45 tablet 0    FLUoxetine (PROzac) 20 MG capsule TAKE 1 CAPSULE TWICE DAILY 180 capsule 0    folic acid (FOLVITE) 1 MG tablet Take 1 tablet by mouth Daily.      LOPERAMIDE HCL PO Take  by mouth As Needed.      meclizine (ANTIVERT) 25 MG tablet       vitamin B-12 (CYANOCOBALAMIN) 1000 MCG tablet Take 1 tablet by mouth 2 (Two) Times a Week.      omeprazole (priLOSEC) 20 MG capsule TAKE 1 CAPSULE EVERY DAY 90 capsule 1    Betahistine HCl (Betahistine Dihydrochloride) powder Take betahistine 24mg BID for 7 days. (Patient not taking: Reported on 5/23/2025)      cefdinir (OMNICEF) 300 MG capsule Take 1 capsule by mouth 2 (Two) Times a Day. (Patient not taking: Reported on 5/23/2025) 20 capsule 0     No facility-administered medications prior to visit.     No opioid medication identified on active medication list. I have reviewed chart for other potential  high risk medication/s and harmful drug interactions in the elderly.      Aspirin is on active medication list. Aspirin use is indicated based on review of current medical condition/s. Pros and cons of this therapy have been discussed today. Benefits of this medication outweigh potential harm.  Patient has been encouraged to continue taking this medication.  .      Patient Active Problem List   Diagnosis    GERD (gastroesophageal reflux disease)    CMC arthritis    Anxiety    Esophageal stricture    Plantar fasciitis    Vitamin D deficiency    Other hyperlipidemia    Family history of GI malignancy    Macrocytosis    Bilateral chronic knee pain    Abnormal EKG    Digital nerve laceration, finger    Vertebrobasilar insufficiency    Dizziness    Ataxia    Stress fracture of metatarsal bone of right foot    Kidney  "stones    B12 deficiency    Depression with anxiety    Atherosclerosis of both carotid arteries    Tinnitus    Chronic bilateral low back pain with sciatica    Degenerative disc disease, lumbar    Neck pain on left side    Degenerative disc disease, cervical    Facet arthropathy, cervical    Arthritis    Elevated fasting glucose    Abnormal renal function    Hiatal hernia    Vertebrobasilar artery syndrome    Prediabetes    Abnormal gait    Decreased anal sphincter tone    Biotin deficiency     Advance Care Planning Advance Directive is on file.  ACP discussion was held with the patient during this visit. Patient has an advance directive in EMR which is still valid.             Objective   Vitals:    05/23/25 1028   BP: 138/88   Pulse: 64   Resp: 16   Temp: 97.8 °F (36.6 °C)   TempSrc: Infrared   SpO2: 98%   Weight: 92.3 kg (203 lb 6.4 oz)   Height: 160 cm (62.99\")   PainSc: 0-No pain       Estimated body mass index is 36.04 kg/m² as calculated from the following:    Height as of this encounter: 160 cm (62.99\").    Weight as of this encounter: 92.3 kg (203 lb 6.4 oz).    Class 2 Severe Obesity (BMI >=35 and <=39.9). Obesity-related health conditions include the following: hypertension and osteoarthritis. Obesity is unchanged. BMI is is above average; BMI management plan is completed. We discussed portion control and increasing exercise.           Does the patient have evidence of cognitive impairment? No  Lab Results   Component Value Date    CHLPL 155 04/25/2025    TRIG 87 04/25/2025    HDL 52 04/25/2025    LDL 87 04/25/2025    VLDL 16 04/25/2025    HGBA1C 5.9 (H) 04/25/2025                                                                                               Health  Risk Assessment    Smoking Status:  Social History     Tobacco Use   Smoking Status Never   Smokeless Tobacco Never     Alcohol Consumption:  Social History     Substance and Sexual Activity   Alcohol Use Not Currently       Fall Risk " Screen  STEADI Fall Risk Assessment was completed, and patient is at LOW risk for falls.Assessment completed on:2025    Depression Screening   Little interest or pleasure in doing things? Not at all   Feeling down, depressed, or hopeless? Not at all   PHQ-2 Total Score 0      Health Habits and Functional and Cognitive Screenin/23/2025    10:30 AM   Functional & Cognitive Status   Do you have difficulty preparing food and eating? No   Do you have difficulty bathing yourself, getting dressed or grooming yourself? No   Do you have difficulty using the toilet? No   Do you have difficulty moving around from place to place? No   Do you have trouble with steps or getting out of a bed or a chair? Yes   Current Diet Other   Dental Exam Up to date   Eye Exam Up to date   Exercise (times per week) 0 times per week   Do you need help using the phone?  No   Are you deaf or do you have serious difficulty hearing?  No   Do you need help to go to places out of walking distance? No   Do you need help shopping? No   Do you need help preparing meals?  No   Do you need help with housework?  No   Do you need help with laundry? No   Do you need help taking your medications? No   Do you need help managing money? No   Do you ever drive or ride in a car without wearing a seat belt? No   Have you felt unusual stress, anger or loneliness in the last month? No   Who do you live with? Spouse   If you need help, do you have trouble finding someone available to you? No   Have you been bothered in the last four weeks by sexual problems? No   Do you have difficulty concentrating, remembering or making decisions? Yes           Age-appropriate Screening Schedule:  Refer to the list below for future screening recommendations based on patient's age, sex and/or medical conditions. Orders for these recommended tests are listed in the plan section. The patient has been provided with a written plan.    Health Maintenance List  Health  Maintenance   Topic Date Due    DXA SCAN  06/23/2025    COVID-19 Vaccine (8 - 2024-25 season) 09/22/2025 (Originally 3/21/2025)    INFLUENZA VACCINE  07/01/2025    TDAP/TD VACCINES (2 - Td or Tdap) 07/08/2025    COLORECTAL CANCER SCREENING  04/03/2026    LIPID PANEL  04/25/2026    ANNUAL WELLNESS VISIT  05/23/2026    MAMMOGRAM  08/16/2026    HEPATITIS C SCREENING  Completed    Pneumococcal Vaccine 50+  Completed    ZOSTER VACCINE  Completed                                                                                                                                                CMS Preventative Services Quick Reference  Risk Factors Identified During Encounter  Immunizations Discussed/Encouraged: Tdap    The above risks/problems have been discussed with the patient.  Pertinent information has been shared with the patient in the After Visit Summary.  An After Visit Summary and PPPS were made available to the patient.    Follow Up:   Next Medicare Wellness visit to be scheduled in 1 year.     Assessment & Plan  Medicare annual wellness visit, subsequent         Abnormal renal function    Orders:    UA / M With / Rflx Culture([facility] ONLY) - Urine, Clean Catch    Kidney stones      Orders:    UA / M With / Rflx Culture([facility] ONLY) - Urine, Clean Catch    Other headache syndrome              Orders:    Ambulatory Referral to Multi-Disciplinary Clinic    Dizziness    Orders:    Ambulatory Referral to Multi-Disciplinary Clinic    Ataxia    Orders:    Ambulatory Referral to Multi-Disciplinary Clinic    Abnormal gait    Orders:    Ambulatory Referral to Multi[facility]Disciplinary Clinic    Vertebrobasilar insufficiency    Orders:    Ambulatory Referral to Multi[facility]Disciplinary Clinic    Atherosclerosis of both carotid arteries    Orders:    Ambulatory Referral to Multi[facility]Disciplinary Clinic    Tinnitus, unspecified laterality    Orders:    Ambulatory Referral to Multi[facility]Disciplinary Clinic    Facet arthropathy, cervical    Orders:     Ambulatory Referral to [facility]Disciplinary Clinic    Degenerative disc disease, cervical    Orders:    Ambulatory Referral to Multi[facility]Disciplinary Clinic    Chronic bilateral low back pain with sciatica, sciatica laterality unspecified         Degeneration of intervertebral disc of lumbar region with discogenic back pain and lower extremity pain         Decreased anal sphincter tone    Orders:    Ambulatory Referral to Multi-Disciplinary Clinic    Other hyperlipidemia            Prediabetes         Vitamin D deficiency         Macrocytosis         B12 deficiency         Biotin deficiency         Esophageal stricture         Gastroesophageal reflux disease without esophagitis         Family history of GI malignancy         Abnormal MRI, breast         Depression with anxiety           Arthritis         Bilateral chronic knee pain         CMC arthritis         Hip pain, unspecified laterality              Follow Up:   No follow-ups on file.

## 2025-05-23 NOTE — PROGRESS NOTES
"Subjective   Virginia Leiva is a 69 y.o. female who presents today in follow-up of hyperlipidemia, vitamin D and B12 deficiencies, macrocytosis, GERD, moods, dizziness, back pain, arthritis, and history of fecal incontenence.    HPI    Dizziness/tinnitus, headache-Back to square 1- tried betahistine- did not help. Neurologist advised did not have anything else to do and advised follow up with ENT. ENT did not see anything and did not have a treatment.     2000- was going to [place] with the kids. She started with similar symptoms- dizzy, couldn't hold head up and felt horrible and would improve and would wake up the next day and feel the same- went to  and had scan and felt nothing wrong. Would get up and felt bad for about 1 week then resolved.   This recurred years ago and did not improve.   Staying tired but hard to do things when she has dizziness.  Dizziness not completely gone but improved.   She was seen by [facility] and started therapy through them.  Is finally helping.  She still has some dizziness but is finally improving.  Neurology was unable to find etiology of dizziness.  She continues with dizziness. Left top of head and parietal area. She wakes up with a headache- mostly on the left but some on the right as well. She notices that when she leans her head back and keeps it steady, she has improvement in dizziness. No neck pain, numbness or tingling in arms. Feels like \"when you sleep too much and wake up\"- feels that way all the time. At night, not moving much. Does not wake up but when she gets up, she feels weird. Affects the way she feels about things.   Dizziness started 2/2020. When she gets up, she feels like her eyes are twitching and very occasionally feels some spinning. Symptoms occur seldom when she was driving down the road, and she got nervous to focus and see. No consistent dizziness with head turning and no dizziness with laying down. She has had ringing in her ears for years " with normal hearing and no ear pain. If she lays on 1 side too long, she has a headache- worse on the left. She denies confusion, trouble speaking, trouble using arms/ legs, and blurred vision. She has had numbness in hands intermittent but no other numbness or neurological symptoms.   Last opt/ophthalmology was > 1 year ago. She was advised to go for annual eye exam.  I referred for MRI brain and IAC with opacified hypoplastic right sphenoid sinus. She was treated for sinusitis and advised we can send to ENT.   Carotid duplex with bilateral plaque without stenosis.    ENT- Dr Lozano- he did ENG and testing. Told slight hearing loss but no other etiology of symtpoms.   Neurosurgery-     Unsteady gait, neck pain, DDD/OA/facet arthropathy cervical spine, dizziness- no numbness, tingling or weakness in arm or hand.  MRI lumbar spine with mild scoliosis, arthritis, and degenerative changes.  Inferior tip of the conus medullaris posterior midline thecal sac at L2-3-borderline but thought to be lower limits of normal.  Very mild dextroscoliotic curvature of the lumbar spine with apex at L3-4, L2-3 minimal posterior spurring with left paracentral disc protrusion or tiny disc herniation with minimal indentation of the left ventral aspect of the thecal sac and minimally narrows the left sided canal, minimal left foraminal narrowing L2-3.  L4-5-tiny posterior annular fissure with posterior central disc bulge and minimal facet overgrowth and minimal if any canal narrowing.  Mild disc space narrowing degenerative endplate changes L5-S1 with mild bilateral facet overgrowth and mild posterior spurring.  Referred to neurosurgery  Seen by Dr. Archuleta-referred to physical therapy.  Follow-up as needed  Back pain-every once in a while has something but if takes a muscle relaxer x 1, is ok.   No worsening, changing, new or different symptoms. She has not had any increased symptoms.  Patient uses Flexeril only occasionally with back  pain. She was told it was arthritis at some point.   History of Fecal incontinence- no recurrence. Doing ok.   She noted change in BM 8/2018 with increased fecal urgency and incontinence.  She also had stress fecal incontinence.  She had significant hemorrhoids on exam and possible mild rectal prolapse as well with decreased rectal tone.  Patient wanted to start with follow-up with Dr. Covarrubias but did not schedule follow-up with him.  She reports symptoms improved.    I discussed the need for follow-up with GI, MRI lumbosacral region, and consideration of further neurological work-up as well as consideration of pelvic floor strengthening PT.  She did not want to proceed with further work-up at that time and has had no recurrence of the symptoms    Hyperlipidemia-has done well with starting atorvastatin and is tolerating without AE.  10-year risk 6.5% 5/2024.  Prediabetes, elevated fasting glucose-A1c 5.6% 11/2023.  A1c increased to 5.8% 5/2024.  Vitamin D-taking 2000 IU once daily.  Advised to increase vitamin D to 2000 IU daily 3/2023.  Macrocytosis, B12-taking B12 500 mcg once weekly.  Now on folic acid instead of MTV.   Biotin deficiency-advised to take biotin 100 mcg daily or 1000 mcg once weekly 5/2024. Taking biotin 1000 mcg once weekly.   Abnormal renal function- was told by neurosurgery could take Naprosyn x 3 if pain. She does not take all the time but took a few days ago. Got better with heating pad.     Kidney stone- no issues now.   She previously went to ER for kidney stone, hydronephrosis, diverticulosis, abdominal pain,  diaphragmatic hernia. Not sure but thought could have passed. Follow up with urology.   Patient went to ER 4/222/2023 for flank pain, nausea, and vomiting. Called urology- start antibiotics and follow up outpatient. Given Norco, Zofran, Keflex given in the hospital and discharged on Keflex 500 mg 4 times daily for 5 days.  Patient asked that medication be changed to Toradol-MB declined  in association of patient's age.  Urine sample contaminated with stool.  CT abd/pelvis-multiple tiny bilateral nonobstructing renal stones.  3 mm right UPJ stone with mild to moderate right-sided hydronephrosis.  Extensive sigmoid diverticulosis without diverticulitis.  Fat-containing left inguinal hernia.  Mild to moderate degenerative changes lumbar spine.  UA- protein, hematuria, leuk est, and 4+ bacteria.   Creatinine 1.21  Seen by Dr Chaudhari    GERD- ok with taking medication- Prilosec without breakthrough symptoms.   If misses a dose, she feels it immediately. Did have hamburger get stuck a [place]  She had an EGD 5/2018 without stricture or need for dilation (history of stricture in the past).  She was noted to have medium/moderate hiatal hernia and mucosa was suggestive of eosinophilic esophagitis with negative biopsies for eosinophils.  He recommended repeat EGD in 3 to 5 years.    Moods- increased to Prozac 20 mg twice daily- has been doing ok.   Prozac 20 mg once daily- tolerated without AE but felt she was snappy and crying easily. She was unsure it was working as well. Not sure if it was related to dizziness or her daughter's stressors or 's issues. She was still working from home 2 days a week and at work 3 days.     Arthritis-She uses Tylenol as needed.  She had right MCP surgery 10/2018 and left MCP surgery 12/2018 and healed well.    She also has chronic left knee pain, intermittent feels like it going to give out. She has been seen by orthopedic surgery with arthritis of her knees.    Skin discoloration, axillary abnormality- has not changed but continues. Not bothersome. Fading but not gone. She does not want to see dermatology.   Has discoloration of skin left axilla- now spreading- was read and now not as red. No pain or itching. It was really read and now faded but has spread in size.     Patient's Specialists:  Breast surgery-Dr. Zaida Alfredo-patient had appointment 3/7/2025 but canceled  appointment by interface and did not reschedule.  Canceled because MRI biopsy was not performed due to no mass being viewed with enhancement.  Cardiology- Dr South- last appt 9/2018 for preop cleareance. EKG thought to be negative. Hyperlipidemia- advised low dose statin and risk reduction. Follow up PRN.   GI-Dr. Le Loera-last appointment 11/2024 for GERD and hiatal hernia.  Recommend EGD to evaluate for any development of Parsons's.  She underwent EGD 3/2025.  3 cm hiatal hernia and otherwise negative.  Prior Dr Covarrubias- last EGD 5/2018 medium hiatal hernia. Repeat in 3-5 years.   Hand surgery- Dr Tejada- last appt 1/2021 in follow up of radial digital nerve laceration left index finger with repair with vein graft 12/2020. Follow up in 3 months.   Prior 1/2019 for left thumb carpometacarpal arthoplasty with LRTI and left de Quervain's release.   Neurology-Dr. Kumar-last appointment 4/29/2025 for refractory BPPV.  Treatment with Epley maneuver, Dramamine, meclizine have failed as supportive therapy.  Will try beta histamine 24 twice daily for 1 week with referral to Dr. Lozano ENT for possible surgical intervention-surgical occlusion of posterior canal with bony plugs, argon laser to induce ossification of the posterior canal and/or transection of the posterior ampullary nerve.  Follow-up 3 months.  Prior Dr Carroll- last appt 8/2021 for dizziness, vertebrobasilar insufficiency, intracranial cerebrovascular stenosis.  Increase aspirin to 325 mg daily.  Referred to neurosurgery for possible arteriogram to determine outpouching as infundibulum versus aneurysm.  Advised him sleep study.  Follow-up after sleep study.   Neurosurgery-Dr. Archuleta-last appointment 2/2024 for back pain without radiculopathy and vertigo.  Since MRI, back pain resolved.  She wanted to hold off on any treatment.  Consider steroid Dosepak or physical therapy if recurrence.  With daily episodes of dizziness, MRI IAC with no evidence of mass  lesion or large vessel infarct.  Referred to physical therapy for evaluation and possible vertigo.  Follow-up with neurologist if persistent symptoms.  Prior Dr. Quiñones-10/2021-no vertebrobasilar stenosis or vertebral artery narrowing bilaterally.  No vascular explanation for symptoms.  Follow-up as needed.  Orthopedic surgery-Dr. Finch-last appointment 1/2025 for bilateral knee OA.  Recommend starting conservative treatment program consisting of cortisone injection during flares, NSAIDs for daily maintenance, physical therapy for strengthening and modalities as indicated and bracing when appropriate.  If symptoms are no longer relieved or become more severe or function begins to deteriorate, joint replacement options to be discussed.  Follow-up 6 months.  Prior Dr Lee Roberto- last appt 8/2017 for patellofemoral arthritis left knee. Received injection. Advied try tumeric or glucosamine. Follow up PRN.   ENT-  Dr. Severtson-last appt 3/2022 for generalized disequilibrium, dizziness, tinnitus, and allergic rhinitis.  Advised increased activity level, RADHA through [facility], avoid caffeine, nicotine, chocolate, and salt.  CBT versus TCA for tinnitus.  Consider hearing aid evaluation.  Dr Lozano-appointment scheduled by neurology 5/6/2025.   Previously treated a second time for blocked sphenoid sinus.   Podiatry- Dr Julian- last appt 7/2020 for 5th metatarsal fracture. Advised MELONIE boot.   Audiology-[facility]-last appointment for 2025 for vestibular assessment-left positioning vertigo with left vestibular weakness, vestibular weakness uncompensated.  Progress toward treatment as expected.  Recommend positioning vertigo, complete functional test for otic capsule pathology, consult with neurology for migraine aspect and questions regarding Chiari malformation.    The following portions of the patient's history were reviewed and updated as appropriate: allergies, current medications, past family history,  past medical history, past social history, past surgical history and problem list.    Review of Systems   Constitutional:  Positive for fatigue. Negative for chills, diaphoresis and fever.   HENT:  Negative for congestion and sore throat.    Respiratory:  Negative for cough.    Cardiovascular:  Negative for chest pain.   Gastrointestinal:  Negative for abdominal pain, nausea and vomiting.   Genitourinary:  Negative for dysuria.   Musculoskeletal:  Negative for myalgias and neck pain.   Skin:  Negative for rash.   Neurological:  Positive for headaches. Negative for weakness and numbness.       Objective    Vitals:    05/23/25 1028   BP: 138/88   Pulse: 64   Resp: 16   Temp: 97.8 °F (36.6 °C)   SpO2: 98%     Body mass index is 36.04 kg/m².     Physical Exam  Vitals and nursing note reviewed.   Constitutional:       Appearance: She is well-developed.   HENT:      Head: Normocephalic and atraumatic.      Right Ear: External ear normal.      Left Ear: External ear normal.      Nose: Nose normal.   Eyes:      General: Lids are normal.      Conjunctiva/sclera: Conjunctivae normal.   Neck:      Vascular: No carotid bruit.   Cardiovascular:      Rate and Rhythm: Normal rate and regular rhythm.      Heart sounds: Normal heart sounds. No murmur heard.     No friction rub. No gallop.   Pulmonary:      Effort: Pulmonary effort is normal. No respiratory distress.      Breath sounds: Normal breath sounds. No wheezing, rhonchi or rales.   Musculoskeletal:         General: No deformity.      Cervical back: Neck supple.   Skin:     General: Skin is warm and dry.   Neurological:      Mental Status: She is alert and oriented to person, place, and time.      Gait: Gait normal.   Psychiatric:         Speech: Speech normal.         Behavior: Behavior normal.         Thought Content: Thought content normal.         Assessment & Plan   Diagnoses and all orders for this visit:    1. Medicare annual wellness visit, subsequent (Primary)    2.  Abnormal renal function  Assessment & Plan:    Orders:    UA / M With / Rflx Culture([facility] ONLY) - Urine, Clean Catch      Orders:  -     UA / M With / Rflx Culture([facility] ONLY) - Urine, Clean Catch    3. Kidney stones  Assessment & Plan:      Orders:    UA / M With / Rflx Culture([facility] ONLY) - Urine, Clean Catch      Orders:  -     UA / M With / Rflx Culture([facility] ONLY) - Urine, Clean Catch    4. Other headache syndrome  -     Ambulatory Referral to [facility]iplinary [facility]    5. Dizziness  -     Ambulatory Referral to [facility]iplinary Clinic    6. Ataxia  -     Ambulatory Referral to [facility]    7. Abnormal gait  -     Ambulatory Referral to [facility]    8. Vertebrobasilar insufficiency  -     Ambulatory Referral to [facility]    9. Atherosclerosis of both carotid arteries  -     Ambulatory Referral to [facility]    10. Tinnitus, unspecified laterality  -     Ambulatory Referral to [facility]    11. Facet arthropathy, cervical  -     Ambulatory Referral to [facility]    12. Degenerative disc disease, cervical  -     Ambulatory Referral to [facility]    13. Chronic bilateral low back pain with sciatica, sciatica laterality unspecified    14. Degeneration of intervertebral disc of lumbar region with discogenic back pain and lower extremity pain    15. Decreased anal sphincter tone  -     Ambulatory Referral to [facility]iplinary Clinic    16. Other hyperlipidemia    17. Prediabetes    18. Vitamin D deficiency    19. Macrocytosis    20. B12 deficiency    21. Biotin deficiency    22. Esophageal stricture    23. Gastroesophageal reflux disease without esophagitis    24. Family history of GI malignancy    25. Abnormal MRI, breast    26. Depression with anxiety    27. Arthritis    28. 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 Hip pain, unspecified laterality    Other orders  -      Microscopic Examination -  -     Urine Culture, Routine -             Assessment and plan  Patient to follow up after neurology appt and we will complete fasting labs at follow up and review neurology.    Dizziness/ Tinnitus, headache- I referred for MRI brain and IAC as well as carotid duplex with some sinus disease but otherwise negative. CTA head and neck to ensure no cerbrovascular etiology of symptoms and advised BPPV physical therapy/ exercises. She was seen by ENT who thought there could be a vascular etiology. They ordered VNG- no etiology of symptoms. She was seen by neurology who also thought some cerebrovascular disease and advised Aspirin 325 mg daily and sleep study.  She had possible abnormal CTA head and neck and was referred to neurosurgery.  She was seen by neurosurgery and underwent angiogram without etiology. MRI cervical spine with mild to moderate OA and DDD with spinal stenosis and possible left nerve root involvement.  She was seen again by neurosurgery without recommendations for intervention.  I repeat carotid duplex and labs.  She was doing PT with [facility]. She then started having headaches and continued dizziness and inability to perform daily activities with symptoms. I referred back to neurology for evaluation and she has appt with them.  She reports family member with Chiari malformation and had concerns about this. Previous MRI did not show Chiari malformation. She was seen by neurology then referred back to ENT.  I will refer to tertiary center with no etiology, as her dizziness and gait abnormality limit her life.    Cervical spine DDD, facet arthropathy, OA- MRI 2/2022 with mild to moderate OA and degenerative changes, minimal spinal stenosis at C5-6, and possible left nerve root involvement of C6 left nerve root.  She was seen by neurosurgery with no intervention recommended.  To be seen if worsening, new or changing symptoms.  Back pain, scoliosis, DJD and DDD lumbar spine,  spinal stenosis- She was seen by neurosurgery with no intervention recommended. She is having worsening pain, and I checked autoimmune testing and gave Medrol dose pack as directed and Flexeril as needed. Consideration of PT, additional imaging, and specialist follow up if persistent worsening. To be seen ASAP if worsening, new or changing symptoms.  Arthritis- Patient with hip pain. I gave Medrol dose pack and Flexeril as needed. Continue Tylenol as needed.  I have counseled her on my recommendations to avoid naproxen if possible.  To see hand surgery or orthopedic surgery if worsening.  Fecal incontinence- If recurrence of symptoms or other symptoms, consider follow up with GI, the need for MRI lumbosacral region, consideration of further neurological work-up, as well as consideration of pelvic floor strengthening PT.  She should let me know if she is willing to proceed with further work-up.  Hyperlipidemia- Lipids were up about 30 points then improved with increasing Lipitor. Continue atorvastatin 20 mg at bedtime.      Vitamin D deficiency-Continue vitamin D 2000 IU daily. Dosing recommendations following labs at follow-up.   B12 deficiency-Continue B12 500 mcg daily. Await labs for further recommendations at follow-up.    Biotin deficiency- Continue Biotin 1000 mcg weekly. Further recommendations for dosing pending results at follow-up.   Abnormal renal function- Patient should avoid NSAIDs and ensure proper hydration.  Unfortunately, she is taking naproxen as needed for pain. I will recheck labs and make further recommendations.  GERD, history of esophageal stricture-Controlled symptoms as long as she does not miss medication but has symptoms with any missed doses.  She did have 1 episode of hamburger getting stuck in her throat.  EGD negative other than small hiatal hernia.  Continue Prilosec 20 mg once daily. Patient to see GI as directed and see if they recommend colonoscopy versus Cologuard for  screening.  Depression with anxiety- Moods were uncontrolled with increased stressors and irritability. She has had improvement with Prozac 20 mg twice daily.  Continue current dose.  To be seen ASAP if worsening moods or AE with medication.     From [facility] 5/2024. She due for mammogram 6/2024 and DEXA 6/2025. She declines colonoscopy- She is up to date on Cologuard until 2026. She should contact pharmacy about updating Pneumovax or Prevnar 20 and RSV vaccination.    I spent 30 minutes caring for Virginia Leiva on this date of service. This time includes time spent by me in the following activities: preparing for the visit, reviewing tests, specialists records, and previous visits, obtaining and/or reviewing a separately obtained history, performing a medically appropriate examination and/or evaluation, counseling and educating the patient/family/caregiver, referring and/or communicating with other health care professionals as necessary, documenting information in the medical record, independently interpreting results and communicating that information with the patient/family/caregiver, and developing a medically appropriate treatment plan with consideration of other conditions, medications, and treatments.

## 2025-05-28 LAB
APPEARANCE UR: ABNORMAL
BACTERIA #/AREA URNS HPF: ABNORMAL /[HPF]
BACTERIA UR CULT: ABNORMAL
BILIRUB UR QL STRIP: NEGATIVE
CASTS URNS QL MICRO: ABNORMAL /LPF
COLOR UR: YELLOW
EPI CELLS #/AREA URNS HPF: >10 /HPF (ref 0–10)
GLUCOSE UR QL STRIP: NEGATIVE
HGB UR QL STRIP: NEGATIVE
KETONES UR QL STRIP: NEGATIVE
LEUKOCYTE ESTERASE UR QL STRIP: ABNORMAL
MICRO URNS: ABNORMAL
MUCOUS THREADS URNS QL MICRO: PRESENT
NITRITE UR QL STRIP: POSITIVE
OTHER ANTIBIOTIC SUSC ISLT: ABNORMAL
PH UR STRIP: 6 [PH] (ref 5–7.5)
PROT UR QL STRIP: ABNORMAL
RBC #/AREA URNS HPF: ABNORMAL /HPF (ref 0–2)
SP GR UR STRIP: 1.02 (ref 1–1.03)
URINALYSIS REFLEX: ABNORMAL
UROBILINOGEN UR STRIP-MCNC: 0.2 MG/DL (ref 0.2–1)
WBC #/AREA URNS HPF: ABNORMAL /HPF (ref 0–5)

## 2025-06-05 DIAGNOSIS — K21.9 GASTROESOPHAGEAL REFLUX DISEASE: ICD-10-CM

## 2025-06-05 DIAGNOSIS — K22.2 ESOPHAGEAL STRICTURE: ICD-10-CM

## 2025-06-06 RX ORDER — OMEPRAZOLE 20 MG/1
20 CAPSULE, DELAYED RELEASE ORAL DAILY
Qty: 90 CAPSULE | Refills: 1 | Status: SHIPPED | OUTPATIENT
Start: 2025-06-06

## 2025-07-02 DIAGNOSIS — E78.49 OTHER HYPERLIPIDEMIA: ICD-10-CM

## 2025-07-02 RX ORDER — ATORVASTATIN CALCIUM 20 MG/1
20 TABLET, FILM COATED ORAL NIGHTLY
Qty: 90 TABLET | Refills: 1 | Status: SHIPPED | OUTPATIENT
Start: 2025-07-02

## 2025-07-18 ENCOUNTER — OFFICE VISIT (OUTPATIENT)
Dept: FAMILY MEDICINE CLINIC | Facility: CLINIC | Age: 70
End: 2025-07-18
Payer: MEDICARE

## 2025-07-18 VITALS
SYSTOLIC BLOOD PRESSURE: 144 MMHG | TEMPERATURE: 97.8 F | HEART RATE: 68 BPM | RESPIRATION RATE: 17 BRPM | HEIGHT: 63 IN | OXYGEN SATURATION: 98 % | BODY MASS INDEX: 35.97 KG/M2 | WEIGHT: 203 LBS | DIASTOLIC BLOOD PRESSURE: 90 MMHG

## 2025-07-18 DIAGNOSIS — F41.8 DEPRESSION WITH ANXIETY: Primary | ICD-10-CM

## 2025-07-18 PROCEDURE — 1160F RVW MEDS BY RX/DR IN RCRD: CPT | Performed by: PHYSICIAN ASSISTANT

## 2025-07-18 PROCEDURE — 99214 OFFICE O/P EST MOD 30 MIN: CPT | Performed by: PHYSICIAN ASSISTANT

## 2025-07-18 PROCEDURE — 1126F AMNT PAIN NOTED NONE PRSNT: CPT | Performed by: PHYSICIAN ASSISTANT

## 2025-07-18 PROCEDURE — 1159F MED LIST DOCD IN RCRD: CPT | Performed by: PHYSICIAN ASSISTANT

## 2025-07-18 NOTE — PROGRESS NOTES
Subjective   Virginia Leiva is a 69 y.o. female who presents today in follow-up of hyperlipidemia, vitamin D and B12 deficiencies, macrocytosis, GERD, moods, dizziness, back pain, arthritis, and history of fecal incontenence.    HPI      Moods- Went on vacation last week. She went on vacation last week and found that she was going to cry for no reason. Sometimes feels sad. Not hopeless or helpless. Getting older but not sure why feeling that way. Sometimes feels anxious and overwhelmed. Work is good to have something to do but not sure she can retire. Not sure that she can tolerate retiring and needs to have something to do. Does not want to go in crowds anymore.   Daughter with anxiety- she is on Prozac 40 mg in the morning and 20 mg at night and ADHD medication. She also has medication for as needed.   Father with anxiety but was not treated.   Previously, she increased Prozac 20 mg twice daily and was doing ok.   Prozac 20 mg once daily- tolerated without AE but felt she was snappy and crying easily. She was unsure it was working as well. Not sure if it was related to dizziness or her daughter's stressors or 's issues. She was still working from home 2 days a week and at work 3 days.       From 5/2025-  Dizziness/tinnitus, headache-Back to square 1- tried betahistine- did not help. Neurologist advised did not have anything else to do and advised follow up with ENT. ENT did not see anything and did not have a treatment.     2000- was going to [place] with the kids. She started with similar symptoms- dizzy, couldn't hold head up and felt horrible and would improve and would wake up the next day and feel the same- went to  and had scan and felt nothing wrong. Would get up and felt bad for about 1 week then resolved.   This recurred years ago and did not improve.   Staying tired but hard to do things when she has dizziness.  Dizziness not completely gone but improved.   She was seen by Melissa jiang and  "started therapy through them.  Is finally helping.  She still has some dizziness but is finally improving.  Neurology was unable to find etiology of dizziness.  She continues with dizziness. Left top of head and parietal area. She wakes up with a headache- mostly on the left but some on the right as well. She notices that when she leans her head back and keeps it steady, she has improvement in dizziness. No neck pain, numbness or tingling in arms. Feels like \"when you sleep too much and wake up\"- feels that way all the time. At night, not moving much. Does not wake up but when she gets up, she feels weird. Affects the way she feels about things.   Dizziness started 2/2020. When she gets up, she feels like her eyes are twitching and very occasionally feels some spinning. Symptoms occur seldom when she was driving down the road, and she got nervous to focus and see. No consistent dizziness with head turning and no dizziness with laying down. She has had ringing in her ears for years with normal hearing and no ear pain. If she lays on 1 side too long, she has a headache- worse on the left. She denies confusion, trouble speaking, trouble using arms/ legs, and blurred vision. She has had numbness in hands intermittent but no other numbness or neurological symptoms.   Last opt/ophthalmology was > 1 year ago. She was advised to go for annual eye exam.  I referred for MRI brain and IAC with opacified hypoplastic right sphenoid sinus. She was treated for sinusitis and advised we can send to ENT.   Carotid duplex with bilateral plaque without stenosis.    ENT- Dr Lozano- he did ENG and testing. Told slight hearing loss but no other etiology of symtpoms.   Neurosurgery-     Unsteady gait, neck pain, DDD/OA/facet arthropathy cervical spine, dizziness- no numbness, tingling or weakness in arm or hand.  MRI lumbar spine with mild scoliosis, arthritis, and degenerative changes.  Inferior tip of the conus medullaris posterior " midline thecal sac at L2-3-borderline but thought to be lower limits of normal.  Very mild dextroscoliotic curvature of the lumbar spine with apex at L3-4, L2-3 minimal posterior spurring with left paracentral disc protrusion or tiny disc herniation with minimal indentation of the left ventral aspect of the thecal sac and minimally narrows the left sided canal, minimal left foraminal narrowing L2-3.  L4-5-tiny posterior annular fissure with posterior central disc bulge and minimal facet overgrowth and minimal if any canal narrowing.  Mild disc space narrowing degenerative endplate changes L5-S1 with mild bilateral facet overgrowth and mild posterior spurring.  Referred to neurosurgery  Seen by Dr. Archuleta-referred to physical therapy.  Follow-up as needed  Back pain-every once in a while has something but if takes a muscle relaxer x 1, is ok.   No worsening, changing, new or different symptoms. She has not had any increased symptoms.  Patient uses Flexeril only occasionally with back pain. She was told it was arthritis at some point.   History of Fecal incontinence- no recurrence. Doing ok.   She noted change in BM 8/2018 with increased fecal urgency and incontinence.  She also had stress fecal incontinence.  She had significant hemorrhoids on exam and possible mild rectal prolapse as well with decreased rectal tone.  Patient wanted to start with follow-up with Dr. Covarrubias but did not schedule follow-up with him.  She reports symptoms improved.    I discussed the need for follow-up with GI, MRI lumbosacral region, and consideration of further neurological work-up as well as consideration of pelvic floor strengthening PT.  She did not want to proceed with further work-up at that time and has had no recurrence of the symptoms    Hyperlipidemia-has done well with starting atorvastatin and is tolerating without AE.  10-year risk 6.5% 5/2024.  Prediabetes, elevated fasting glucose-A1c 5.6% 11/2023.  A1c increased to 5.8%  5/2024.  Vitamin D-taking 2000 IU once daily.  Advised to increase vitamin D to 2000 IU daily 3/2023.  Macrocytosis, B12-taking B12 500 mcg once weekly.  Now on folic acid instead of MTV.   Biotin deficiency-advised to take biotin 100 mcg daily or 1000 mcg once weekly 5/2024. Taking biotin 1000 mcg once weekly.   Abnormal renal function- was told by neurosurgery could take Naprosyn x 3 if pain. She does not take all the time but took a few days ago. Got better with heating pad.     Kidney stone- no issues now.   She previously went to ER for kidney stone, hydronephrosis, diverticulosis, abdominal pain,  diaphragmatic hernia. Not sure but thought could have passed. Follow up with urology.   Patient went to ER 4/222/2023 for flank pain, nausea, and vomiting. Called urology- start antibiotics and follow up outpatient. Given Norco, Zofran, Keflex given in the hospital and discharged on Keflex 500 mg 4 times daily for 5 days.  Patient asked that medication be changed to Toradol-MB declined in association of patient's age.  Urine sample contaminated with stool.  CT abd/pelvis-multiple tiny bilateral nonobstructing renal stones.  3 mm right UPJ stone with mild to moderate right-sided hydronephrosis.  Extensive sigmoid diverticulosis without diverticulitis.  Fat-containing left inguinal hernia.  Mild to moderate degenerative changes lumbar spine.  UA- protein, hematuria, leuk est, and 4+ bacteria.   Creatinine 1.21  Seen by Dr Chaudhari    GERD- ok with taking medication- Prilosec without breakthrough symptoms.   If misses a dose, she feels it immediately. Did have hamburger get stuck a [place]  She had an EGD 5/2018 without stricture or need for dilation (history of stricture in the past).  She was noted to have medium/moderate hiatal hernia and mucosa was suggestive of eosinophilic esophagitis with negative biopsies for eosinophils.  He recommended repeat EGD in 3 to 5 years.      Arthritis-She uses Tylenol as needed.  She  had right MCP surgery 10/2018 and left MCP surgery 12/2018 and healed well.    She also has chronic left knee pain, intermittent feels like it going to give out. She has been seen by orthopedic surgery with arthritis of her knees.    Skin discoloration, axillary abnormality- has not changed but continues. Not bothersome. Fading but not gone. She does not want to see dermatology.   Has discoloration of skin left axilla- now spreading- was read and now not as red. No pain or itching. It was really read and now faded but has spread in size.     Patient's Specialists:  Breast surgery-Dr. Zaida Alfredo-patient had appointment 3/7/2025 but canceled appointment by interface and did not reschedule.  Canceled because MRI biopsy was not performed due to no mass being viewed with enhancement.  Cardiology- Dr South- last appt 9/2018 for preop cleareance. EKG thought to be negative. Hyperlipidemia- advised low dose statin and risk reduction. Follow up PRN.   GI-Dr. Le Loera-last appointment 11/2024 for GERD and hiatal hernia.  Recommend EGD to evaluate for any development of Parsons's.  She underwent EGD 3/2025.  3 cm hiatal hernia and otherwise negative.  Prior Dr Covarrubias- last EGD 5/2018 medium hiatal hernia. Repeat in 3-5 years.   Hand surgery- Dr Tejada- last appt 1/2021 in follow up of radial digital nerve laceration left index finger with repair with vein graft 12/2020. Follow up in 3 months.   Prior 1/2019 for left thumb carpometacarpal arthoplasty with LRTI and left de Quervain's release.   Neurology-Dr. Kumar-last appointment 4/29/2025 for refractory BPPV.  Treatment with Epley maneuver, Dramamine, meclizine have failed as supportive therapy.  Will try beta histamine 24 twice daily for 1 week with referral to Dr. Lozano ENT for possible surgical intervention-surgical occlusion of posterior canal with bony plugs, argon laser to induce ossification of the posterior canal and/or transection of the posterior ampullary  nerve.  Follow-up 3 months.  Prior Dr Carroll- last appt 8/2021 for dizziness, vertebrobasilar insufficiency, intracranial cerebrovascular stenosis.  Increase aspirin to 325 mg daily.  Referred to neurosurgery for possible arteriogram to determine outpouching as infundibulum versus aneurysm.  Advised him sleep study.  Follow-up after sleep study.   Neurosurgery-Dr. Archuleta-last appointment 2/2024 for back pain without radiculopathy and vertigo.  Since MRI, back pain resolved.  She wanted to hold off on any treatment.  Consider steroid Dosepak or physical therapy if recurrence.  With daily episodes of dizziness, MRI IAC with no evidence of mass lesion or large vessel infarct.  Referred to physical therapy for evaluation and possible vertigo.  Follow-up with neurologist if persistent symptoms.  Prior Dr. Quiñones-10/2021-no vertebrobasilar stenosis or vertebral artery narrowing bilaterally.  No vascular explanation for symptoms.  Follow-up as needed.  Orthopedic surgery-Dr. Finch-last appointment 1/2025 for bilateral knee OA.  Recommend starting conservative treatment program consisting of cortisone injection during flares, NSAIDs for daily maintenance, physical therapy for strengthening and modalities as indicated and bracing when appropriate.  If symptoms are no longer relieved or become more severe or function begins to deteriorate, joint replacement options to be discussed.  Follow-up 6 months.  Prior Dr Lee Roberto- last appt 8/2017 for patellofemoral arthritis left knee. Received injection. Advied try tumeric or glucosamine. Follow up PRN.   ENT-  Dr. Severtson-last appt 3/2022 for generalized disequilibrium, dizziness, tinnitus, and allergic rhinitis.  Advised increased activity level, RADHA through [facility], avoid caffeine, nicotine, chocolate, and salt.  CBT versus TCA for tinnitus.  Consider hearing aid evaluation.  Dr Lozano-appointment scheduled by neurology 5/6/2025.   Previously treated a  second time for blocked sphenoid sinus.   Podiatry- Dr Julian- last appt 7/2020 for 5th metatarsal fracture. Advised MELONIE boot.   Audiology-Saniya jiang-last appointment for 2025 for vestibular assessment-left positioning vertigo with left vestibular weakness, vestibular weakness uncompensated.  Progress toward treatment as expected.  Recommend positioning vertigo, complete functional test for otic capsule pathology, consult with neurology for migraine aspect and questions regarding Chiari malformation.    The following portions of the patient's history were reviewed and updated as appropriate: allergies, current medications, past family history, past medical history, past social history, past surgical history and problem list.    Review of Systems   Constitutional:  Positive for fatigue. Negative for chills, diaphoresis and fever.   HENT:  Negative for congestion and sore throat.    Respiratory:  Negative for cough.    Cardiovascular:  Negative for chest pain.   Gastrointestinal:  Negative for abdominal pain, nausea and vomiting.   Genitourinary:  Negative for dysuria.   Musculoskeletal:  Negative for myalgias and neck pain.   Skin:  Negative for rash.   Neurological:  Positive for headaches. Negative for weakness and numbness.       Objective    Vitals:    07/18/25 1129   BP: 144/90   Pulse: 68   Resp: 17   Temp: 97.8 °F (36.6 °C)   SpO2: 98%       Body mass index is 35.97 kg/m².     Physical Exam  Vitals and nursing note reviewed.   Constitutional:       Appearance: She is well-developed.   HENT:      Head: Normocephalic and atraumatic.      Right Ear: External ear normal.      Left Ear: External ear normal.      Nose: Nose normal.   Eyes:      General: Lids are normal.      Conjunctiva/sclera: Conjunctivae normal.   Neck:      Vascular: No carotid bruit.   Cardiovascular:      Rate and Rhythm: Normal rate and regular rhythm.      Heart sounds: Normal heart sounds. No murmur heard.     No friction rub. No gallop.    Pulmonary:      Effort: Pulmonary effort is normal. No respiratory distress.      Breath sounds: Normal breath sounds. No wheezing, rhonchi or rales.   Musculoskeletal:         General: No deformity.      Cervical back: Neck supple.   Skin:     General: Skin is warm and dry.   Neurological:      Mental Status: She is alert and oriented to person, place, and time.      Gait: Gait normal.   Psychiatric:         Mood and Affect: Mood is depressed.         Speech: Speech normal.         Behavior: Behavior normal.         Thought Content: Thought content normal.         Assessment & Plan   Diagnoses and all orders for this visit:    1. Depression with anxiety (Primary)               Assessment and plan  Follow-up with me in 1 month for reevaluation of moods and keep follow-up with me in November.  She will also see [facility] as scheduled October 1.    Depression with anxiety- Moods were uncontrolled with increased stressors and irritability. She had improvement with Prozac 20 mg twice daily and has since had worsening depression and anxiety.  Her daughter has done well with Prozac 40 mg in the day and 20 mg in the evening.  We will have her increase Prozac to 40 mg during the day and 20 mg in the evening.  If she is fatigued in the day, she will change and take 20 mg in the day and 40 mg in the evening.  She will let me know immediately if she has any worsening dizziness or unsteadiness.  We will consider as needed medication, consideration of propranolol or hydroxyzine if needed for anxiety.  To be seen ASAP if worsening moods or AE with medication.  Otherwise, I will reevaluate in 1 month.    From 5/2025-  Dizziness/ Tinnitus, headache- I referred for MRI brain and IAC as well as carotid duplex with some sinus disease but otherwise negative. CTA head and neck to ensure no cerbrovascular etiology of symptoms and advised BPPV physical therapy/ exercises. She was seen by ENT who thought there could be a vascular  etiology. They ordered VNG- no etiology of symptoms. She was seen by neurology who also thought some cerebrovascular disease and advised Aspirin 325 mg daily and sleep study.  She had possible abnormal CTA head and neck and was referred to neurosurgery.  She was seen by neurosurgery and underwent angiogram without etiology. MRI cervical spine with mild to moderate OA and DDD with spinal stenosis and possible left nerve root involvement.  She was seen again by neurosurgery without recommendations for intervention.  I repeat carotid duplex and labs.  She was doing PT with [facility]. She then started having headaches and continued dizziness and inability to perform daily activities with symptoms. I referred back to neurology for evaluation and she has appt with them.  She reports family member with Chiari malformation and had concerns about this. Previous MRI did not show Chiari malformation. She was seen by neurology then referred back to ENT.  I will refer to tertiary center with no etiology, as her dizziness and gait abnormality limit her life.    Cervical spine DDD, facet arthropathy, OA- MRI 2/2022 with mild to moderate OA and degenerative changes, minimal spinal stenosis at C5-6, and possible left nerve root involvement of C6 left nerve root.  She was seen by neurosurgery with no intervention recommended.  To be seen if worsening, new or changing symptoms.  Back pain, scoliosis, DJD and DDD lumbar spine, spinal stenosis- She was seen by neurosurgery with no intervention recommended. She is having worsening pain, and I checked autoimmune testing and gave Medrol dose pack as directed and Flexeril as needed. Consideration of PT, additional imaging, and specialist follow up if persistent worsening. To be seen ASAP if worsening, new or changing symptoms.  Arthritis- Patient with hip pain. I gave Medrol dose pack and Flexeril as needed. Continue Tylenol as needed.  I have counseled her on my recommendations to  avoid naproxen if possible.  To see hand surgery or orthopedic surgery if worsening.  Fecal incontinence- If recurrence of symptoms or other symptoms, consider follow up with GI, the need for MRI lumbosacral region, consideration of further neurological work-up, as well as consideration of pelvic floor strengthening PT.  She should let me know if she is willing to proceed with further work-up.  Hyperlipidemia- Lipids were up about 30 points then improved with increasing Lipitor. Continue atorvastatin 20 mg at bedtime.      Vitamin D deficiency-Continue vitamin D 2000 IU daily. Dosing recommendations following labs at follow-up.   B12 deficiency-Continue B12 500 mcg daily. Await labs for further recommendations at follow-up.    Biotin deficiency- Continue Biotin 1000 mcg weekly. Further recommendations for dosing pending results at follow-up.   Abnormal renal function- Patient should avoid NSAIDs and ensure proper hydration.  Unfortunately, she is taking naproxen as needed for pain. I will recheck labs and make further recommendations.  GERD, history of esophageal stricture-Controlled symptoms as long as she does not miss medication but has symptoms with any missed doses.  She did have 1 episode of hamburger getting stuck in her throat.  EGD negative other than small hiatal hernia.  Continue Prilosec 20 mg once daily. Patient to see GI as directed and see if they recommend colonoscopy versus Cologuard for screening.       From [facility] 5/2024. She due for mammogram 6/2024 and DEXA 6/2025. She declines colonoscopy- She is up to date on Cologuard until 2026. She should contact pharmacy about updating Pneumovax or Prevnar 20 and RSV vaccination.    I spent 30 minutes caring for Virginia Leiva on this date of service. This time includes time spent by me in the following activities: preparing for the visit, reviewing tests, specialists records, and previous visits, obtaining and/or reviewing a separately obtained history,  performing a medically appropriate examination and/or evaluation, counseling and educating the patient/family/caregiver, referring and/or communicating with other health care professionals as necessary, documenting information in the medical record, independently interpreting results and communicating that information with the patient/family/caregiver, and developing a medically appropriate treatment plan with consideration of other conditions, medications, and treatments.

## 2025-08-03 DIAGNOSIS — K21.9 GASTROESOPHAGEAL REFLUX DISEASE: ICD-10-CM

## 2025-08-03 DIAGNOSIS — K22.2 ESOPHAGEAL STRICTURE: ICD-10-CM

## 2025-08-15 ENCOUNTER — OFFICE VISIT (OUTPATIENT)
Dept: ORTHOPEDIC SURGERY | Facility: CLINIC | Age: 70
End: 2025-08-15
Payer: MEDICARE

## 2025-08-15 VITALS — HEIGHT: 63 IN | TEMPERATURE: 97.7 F | WEIGHT: 206 LBS | BODY MASS INDEX: 36.5 KG/M2

## 2025-08-15 DIAGNOSIS — M17.0 PRIMARY OSTEOARTHRITIS OF BOTH KNEES: Primary | ICD-10-CM

## 2025-08-29 ENCOUNTER — OFFICE VISIT (OUTPATIENT)
Dept: FAMILY MEDICINE CLINIC | Facility: CLINIC | Age: 70
End: 2025-08-29
Payer: MEDICARE

## 2025-08-29 VITALS
TEMPERATURE: 97.3 F | BODY MASS INDEX: 36.14 KG/M2 | WEIGHT: 204 LBS | HEART RATE: 83 BPM | RESPIRATION RATE: 14 BRPM | OXYGEN SATURATION: 95 % | DIASTOLIC BLOOD PRESSURE: 64 MMHG | HEIGHT: 63 IN | SYSTOLIC BLOOD PRESSURE: 124 MMHG

## 2025-08-29 DIAGNOSIS — F41.8 DEPRESSION WITH ANXIETY: Primary | ICD-10-CM

## 2025-08-29 PROCEDURE — 1160F RVW MEDS BY RX/DR IN RCRD: CPT | Performed by: PHYSICIAN ASSISTANT

## 2025-08-29 PROCEDURE — 1159F MED LIST DOCD IN RCRD: CPT | Performed by: PHYSICIAN ASSISTANT

## 2025-08-29 PROCEDURE — 1125F AMNT PAIN NOTED PAIN PRSNT: CPT | Performed by: PHYSICIAN ASSISTANT

## 2025-08-29 PROCEDURE — 99213 OFFICE O/P EST LOW 20 MIN: CPT | Performed by: PHYSICIAN ASSISTANT

## (undated) DEVICE — TBG 02 CRUSH RESIST LF CLR 7FT

## (undated) DEVICE — FLEX ADVANTAGE 1500CC: Brand: FLEX ADVANTAGE

## (undated) DEVICE — Device

## (undated) DEVICE — FRCP BX RADJAW4 NDL 2.8 240CM LG OG BX40

## (undated) DEVICE — BLCK/BITE BLOX W/DENTL/RIM W/STRAP 54F

## (undated) DEVICE — BITEBLOCK OMNI BLOC

## (undated) DEVICE — GOWN ISOL W/THUMB UNIV BLU BX/15

## (undated) DEVICE — CANN NASL CO2 TRULINK W/O2 A/

## (undated) DEVICE — Device: Brand: DEFENDO AIR/WATER/SUCTION AND BIOPSY VALVE

## (undated) DEVICE — GAUZE,SPONGE,FLUFF,6"X6.75",STRL,5/TRAY: Brand: MEDLINE

## (undated) DEVICE — KT ORCA ORCAPOD DISP STRL

## (undated) DEVICE — TUBING, SUCTION, 1/4" X 10', STRAIGHT: Brand: MEDLINE

## (undated) DEVICE — ADAPT CLN SCPE ENDO PORPOISE BX/50 DISP

## (undated) DEVICE — MSK ENDO PORT O2 POM ELITE CURAPLEX A/